# Patient Record
Sex: FEMALE | Race: WHITE | NOT HISPANIC OR LATINO | Employment: PART TIME | URBAN - METROPOLITAN AREA
[De-identification: names, ages, dates, MRNs, and addresses within clinical notes are randomized per-mention and may not be internally consistent; named-entity substitution may affect disease eponyms.]

---

## 2018-04-03 ENCOUNTER — OFFICE VISIT (OUTPATIENT)
Dept: URGENT CARE | Facility: CLINIC | Age: 53
End: 2018-04-03
Payer: COMMERCIAL

## 2018-04-03 VITALS
TEMPERATURE: 97.9 F | RESPIRATION RATE: 16 BRPM | WEIGHT: 152 LBS | DIASTOLIC BLOOD PRESSURE: 90 MMHG | OXYGEN SATURATION: 97 % | HEIGHT: 67 IN | HEART RATE: 78 BPM | BODY MASS INDEX: 23.86 KG/M2 | SYSTOLIC BLOOD PRESSURE: 132 MMHG

## 2018-04-03 DIAGNOSIS — N30.00 ACUTE CYSTITIS WITHOUT HEMATURIA: Primary | ICD-10-CM

## 2018-04-03 LAB
SL AMB  POCT GLUCOSE, UA: ABNORMAL
SL AMB LEUKOCYTE ESTERASE,UA: ABNORMAL
SL AMB POCT BILIRUBIN,UA: ABNORMAL
SL AMB POCT BLOOD,UA: ABNORMAL
SL AMB POCT CLARITY,UA: CLEAR
SL AMB POCT COLOR,UA: YELLOW
SL AMB POCT KETONES,UA: ABNORMAL
SL AMB POCT NITRITE,UA: ABNORMAL
SL AMB POCT PH,UA: 6
SL AMB POCT SPECIFIC GRAVITY,UA: 1
SL AMB POCT URINE PROTEIN: ABNORMAL
SL AMB POCT UROBILINOGEN: 0.2

## 2018-04-03 PROCEDURE — 87086 URINE CULTURE/COLONY COUNT: CPT | Performed by: PHYSICIAN ASSISTANT

## 2018-04-03 PROCEDURE — 81002 URINALYSIS NONAUTO W/O SCOPE: CPT | Performed by: PHYSICIAN ASSISTANT

## 2018-04-03 PROCEDURE — G0382 LEV 3 HOSP TYPE B ED VISIT: HCPCS | Performed by: PHYSICIAN ASSISTANT

## 2018-04-03 RX ORDER — ASPIRIN 81 MG/1
81 TABLET, CHEWABLE ORAL DAILY
COMMUNITY

## 2018-04-03 RX ORDER — ATORVASTATIN CALCIUM 40 MG/1
40 TABLET, FILM COATED ORAL DAILY
COMMUNITY
End: 2019-08-21 | Stop reason: ALTCHOICE

## 2018-04-03 RX ORDER — SULFAMETHOXAZOLE AND TRIMETHOPRIM 800; 160 MG/1; MG/1
1 TABLET ORAL EVERY 12 HOURS SCHEDULED
Qty: 10 TABLET | Refills: 0 | Status: SHIPPED | OUTPATIENT
Start: 2018-04-03 | End: 2018-04-08

## 2018-04-03 RX ORDER — LISINOPRIL 10 MG/1
10 TABLET ORAL DAILY
COMMUNITY
End: 2019-08-30 | Stop reason: SDUPTHER

## 2018-04-03 NOTE — PATIENT INSTRUCTIONS
Hydrate and do not hold urine  We will check a culture and call if we need to change medicines  If back pain or fever go to the ER  Follow up with PCP in 3-5 days  Proceed to  ER if symptoms worsen

## 2018-04-04 LAB — BACTERIA UR CULT: ABNORMAL

## 2018-04-14 LAB — HBA1C MFR BLD HPLC: 5.7 %

## 2018-06-06 ENCOUNTER — OFFICE VISIT (OUTPATIENT)
Dept: URGENT CARE | Facility: CLINIC | Age: 53
End: 2018-06-06
Payer: COMMERCIAL

## 2018-06-06 VITALS
HEART RATE: 89 BPM | HEIGHT: 67 IN | SYSTOLIC BLOOD PRESSURE: 122 MMHG | RESPIRATION RATE: 16 BRPM | WEIGHT: 149.8 LBS | DIASTOLIC BLOOD PRESSURE: 88 MMHG | TEMPERATURE: 97.4 F | OXYGEN SATURATION: 96 % | BODY MASS INDEX: 23.51 KG/M2

## 2018-06-06 DIAGNOSIS — H65.111 ACUTE MUCOID OTITIS MEDIA OF RIGHT EAR: Primary | ICD-10-CM

## 2018-06-06 PROCEDURE — G0382 LEV 3 HOSP TYPE B ED VISIT: HCPCS | Performed by: PHYSICIAN ASSISTANT

## 2018-06-06 RX ORDER — FLUTICASONE PROPIONATE 50 MCG
1 SPRAY, SUSPENSION (ML) NASAL DAILY
Qty: 16 G | Refills: 0 | Status: SHIPPED | OUTPATIENT
Start: 2018-06-06 | End: 2020-08-23 | Stop reason: ALTCHOICE

## 2018-06-06 RX ORDER — AZITHROMYCIN 250 MG/1
TABLET, FILM COATED ORAL
Qty: 6 TABLET | Refills: 0 | Status: SHIPPED | OUTPATIENT
Start: 2018-06-06 | End: 2018-06-11 | Stop reason: ALTCHOICE

## 2018-06-06 NOTE — PATIENT INSTRUCTIONS
Afrin for 3 days, sudafed otc for congestion  Follow up with PCP in 3-5 days  Proceed to  ER if symptoms worsen

## 2018-06-06 NOTE — PROGRESS NOTES
[facility]        NAME: Froylan Faith is a 48 y o  female  : 1965    MRN: [id number]  DATE: 2018  TIME: 2:00 PM    Assessment and Plan   Acute mucoid otitis media of right ear [H65 111]  1  Acute mucoid otitis media of right ear  fluticasone (FLONASE) 50 mcg/act nasal spray    azithromycin (ZITHROMAX) 250 mg tablet         Patient Instructions     Afrin for 3 days, sudafed otc for congestion  Follow up with PCP in 3-5 days  Proceed to  ER if symptoms worsen  Chief Complaint     Chief Complaint   Patient presents with    Cold Like Symptoms     since thurs  Complains of cough, nasal and chest congestion   Earache     flew to [place] and is sensitive to flying  having decreased hearing especially to L ear  History of Present Illness       53-year-old female complains of left greater than right ear pain and pressure for several days  She had a cold and then was flying  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  She denies ear drainage  No ringing in her ears  Some decreased hearing  She has sinus congestion and nasal congestion but no pain  No fever    No chest pain or shortness of breath        Review of Systems   Review of Systems      Current Medications       Current Outpatient Prescriptions:     aspirin 81 mg chewable tablet, Chew 81 mg daily, Disp: , Rfl:     atorvastatin (LIPITOR) 40 mg tablet, Take 40 mg by mouth daily, Disp: , Rfl:     levonorgestrel (MIRENA) 20 MCG/24HR IUD, 1 each by Intrauterine route once, Disp: , Rfl:     lisinopril (ZESTRIL) 10 mg tablet, Take 10 mg by mouth daily, Disp: , Rfl:     azithromycin (ZITHROMAX) 250 mg tablet, 2 tabs on day 1, then 1 tab daily for 4 days, Disp: 6 tablet, Rfl: 0    fluticasone (FLONASE) 50 mcg/act nasal spray, 1 spray into each nostril daily, Disp: 16 g, Rfl: 0    Current Allergies     Allergies as of 2018 - Reviewed 2018   Allergen Reaction Noted    Penicillins  2018 The following portions of the patient's history were reviewed and updated as appropriate: allergies, current medications, past family history, past medical history, past social history, past surgical history and problem list      Past Medical History:   Diagnosis Date    Hyperlipemia     Hypertension        Past Surgical History:   Procedure Laterality Date    APPENDECTOMY         Family History   Problem Relation Age of Onset    Heart disease Mother     Diabetes Mother     Kidney disease Mother     Angina Mother     Heart disease Father     Diabetes Father     Prostate cancer Father          Medications have been verified  Objective   /88 (BP Location: Left arm)   Pulse 89   Temp (!) 97 4 °F (36 3 °C) (Tympanic)   Resp 16   Ht 5' 7" (1 702 m)   Wt 67 9 kg (149 lb 12 8 oz)   SpO2 96%   BMI 23 46 kg/m²        Physical Exam     Physical Exam   Constitutional: She appears well-developed and well-nourished  No distress  HENT:   Right Ear: External ear and ear canal normal  Tympanic membrane is erythematous and bulging  Tympanic membrane is not perforated and not retracted  A middle ear effusion is present  Left Ear: External ear and ear canal normal  Tympanic membrane is bulging  Tympanic membrane is not perforated, not erythematous and not retracted  A middle ear effusion is present  Nose: Rhinorrhea present  Right sinus exhibits no maxillary sinus tenderness and no frontal sinus tenderness  Left sinus exhibits no maxillary sinus tenderness and no frontal sinus tenderness  Mouth/Throat: Oropharynx is clear and moist  No posterior oropharyngeal erythema  Eyes: Conjunctivae and EOM are normal  Pupils are equal, round, and reactive to light  No scleral icterus  Neck: Normal range of motion  Neck supple  Cardiovascular: Normal rate, regular rhythm and normal heart sounds  Pulmonary/Chest: Effort normal and breath sounds normal  No respiratory distress  She has no wheezes  She has no rales  Abdominal: Soft  Bowel sounds are normal  She exhibits no distension and no mass  There is no tenderness  There is no rebound and no guarding  Lymphadenopathy:     She has no cervical adenopathy  Skin: Skin is warm and dry  No rash noted

## 2018-06-11 ENCOUNTER — OFFICE VISIT (OUTPATIENT)
Dept: URGENT CARE | Facility: CLINIC | Age: 53
End: 2018-06-11
Payer: COMMERCIAL

## 2018-06-11 VITALS
DIASTOLIC BLOOD PRESSURE: 88 MMHG | RESPIRATION RATE: 18 BRPM | OXYGEN SATURATION: 98 % | WEIGHT: 148.4 LBS | BODY MASS INDEX: 23.29 KG/M2 | HEART RATE: 87 BPM | TEMPERATURE: 98.7 F | HEIGHT: 67 IN | SYSTOLIC BLOOD PRESSURE: 114 MMHG

## 2018-06-11 DIAGNOSIS — H65.91 MIDDLE EAR EFFUSION, RIGHT: Primary | ICD-10-CM

## 2018-06-11 PROCEDURE — G0382 LEV 3 HOSP TYPE B ED VISIT: HCPCS | Performed by: PHYSICIAN ASSISTANT

## 2018-06-11 NOTE — PROGRESS NOTES
[facility]        NAME: Darian Lobo is a 48 y o  female  : 1965    MRN: [id number]  DATE: 2018  TIME: 11:18 AM    Assessment and Plan   Middle ear effusion, right [H65 91]  1  Middle ear effusion, right           Patient Instructions       Follow up with PCP in 3-5 days  Proceed to  ER if symptoms worsen  Chief Complaint     Chief Complaint   Patient presents with    Earache     Completed course of antibiotcs yesterday for b/l ear infections  States her R ear still feels clogged but no pain         History of Present Illness         56-year-old female complains of continued right ear feeling clogged and congested  No pain in the ears  She has some cough and congestion  She finished the antibiotic  No ear drainage  Some decreased hearing          Review of Systems   Review of Systems      Current Medications       Current Outpatient Prescriptions:     aspirin 81 mg chewable tablet, Chew 81 mg daily, Disp: , Rfl:     atorvastatin (LIPITOR) 40 mg tablet, Take 40 mg by mouth daily, Disp: , Rfl:     fluticasone (FLONASE) 50 mcg/act nasal spray, 1 spray into each nostril daily, Disp: 16 g, Rfl: 0    levonorgestrel (MIRENA) 20 MCG/24HR IUD, 1 each by Intrauterine route once, Disp: , Rfl:     lisinopril (ZESTRIL) 10 mg tablet, Take 10 mg by mouth daily, Disp: , Rfl:     Current Allergies     Allergies as of 2018 - Reviewed 2018   Allergen Reaction Noted    Penicillins  2018            The following portions of the patient's history were reviewed and updated as appropriate: allergies, current medications, past family history, past medical history, past social history, past surgical history and problem list      Past Medical History:   Diagnosis Date    Hyperlipemia     Hypertension        Past Surgical History:   Procedure Laterality Date    APPENDECTOMY         Family History   Problem Relation Age of Onset    Heart disease Mother     Diabetes Mother  Kidney disease Mother     Angina Mother     Heart disease Father     Diabetes Father     Prostate cancer Father          Medications have been verified  Objective   /88   Pulse 87   Temp 98 7 °F (37 1 °C) (Temporal)   Resp 18   Ht 5' 7" (1 702 m)   Wt 67 3 kg (148 lb 6 4 oz)   SpO2 98%   BMI 23 24 kg/m²        Physical Exam     Physical Exam   Constitutional: She appears well-developed and well-nourished  HENT:   Head: Normocephalic and atraumatic  Right Ear: Tympanic membrane is not erythematous, not retracted and not bulging  A middle ear effusion (  straw-colored middle ear effusion but no erythema or bulging) is present  Left Ear: Tympanic membrane is not erythematous, not retracted and not bulging  No middle ear effusion

## 2018-09-20 LAB — HBA1C MFR BLD HPLC: 5.6 %

## 2019-08-21 ENCOUNTER — OFFICE VISIT (OUTPATIENT)
Dept: FAMILY MEDICINE CLINIC | Facility: CLINIC | Age: 54
End: 2019-08-21
Payer: COMMERCIAL

## 2019-08-21 VITALS
SYSTOLIC BLOOD PRESSURE: 118 MMHG | TEMPERATURE: 98.5 F | BODY MASS INDEX: 23.26 KG/M2 | HEIGHT: 67 IN | OXYGEN SATURATION: 98 % | DIASTOLIC BLOOD PRESSURE: 72 MMHG | WEIGHT: 148.2 LBS | HEART RATE: 87 BPM

## 2019-08-21 DIAGNOSIS — E78.2 MIXED HYPERLIPIDEMIA: ICD-10-CM

## 2019-08-21 DIAGNOSIS — J30.9 ALLERGIC RHINITIS, UNSPECIFIED SEASONALITY, UNSPECIFIED TRIGGER: ICD-10-CM

## 2019-08-21 DIAGNOSIS — Z12.31 ENCOUNTER FOR SCREENING MAMMOGRAM FOR BREAST CANCER: ICD-10-CM

## 2019-08-21 DIAGNOSIS — I10 ESSENTIAL HYPERTENSION: Primary | ICD-10-CM

## 2019-08-21 PROCEDURE — 3074F SYST BP LT 130 MM HG: CPT | Performed by: FAMILY MEDICINE

## 2019-08-21 PROCEDURE — 3008F BODY MASS INDEX DOCD: CPT | Performed by: FAMILY MEDICINE

## 2019-08-21 PROCEDURE — 99204 OFFICE O/P NEW MOD 45 MIN: CPT | Performed by: FAMILY MEDICINE

## 2019-08-21 PROCEDURE — 1036F TOBACCO NON-USER: CPT | Performed by: FAMILY MEDICINE

## 2019-08-21 RX ORDER — ROSUVASTATIN CALCIUM 5 MG/1
5 TABLET, COATED ORAL DAILY
COMMUNITY
End: 2019-12-04 | Stop reason: SDUPTHER

## 2019-08-21 NOTE — PROGRESS NOTES
Blanka Cordero 1965 female MRN: [id number]      ASSESSMENT/PLAN  Problem List Items Addressed This Visit        Respiratory    Allergic rhinitis       Cardiovascular and Mediastinum    HTN (hypertension) - Primary    Relevant Orders    Lipid panel    Comprehensive metabolic panel       Other    Mixed hyperlipidemia    Relevant Medications    rosuvastatin (CRESTOR) 5 mg tablet    Other Relevant Orders    Lipid panel    Comprehensive metabolic panel      Other Visit Diagnoses     Encounter for screening mammogram for breast cancer        Relevant Orders    Mammo screening bilateral w 3d & cad        HTN: Within goal, continue current regimen   HLD: CMP + Lipids, continue Crestor  - Encouraged increasing fruit and vegetable intake into diet; discussion starting to walk 5 minutes each day, and slowly increasing   Allergic Rhinitis: No acute exacerbation, continue PRN Flonase    HM:   Mammo ordered -- to schedule in 10/2019  Will request records for PAP   Colonoscopy due in 2027   Depression screening negative     RTC in 6 months, or sooner PRN  No future appointments  SUBJECTIVE  CC: Establish Care      HPI:  Blanka Cordero is a 48 y o  female who presents to establish care  History reviewed and updated as below  HTN: ROS benign as below  HLD: Due for lipid panel  Allergic Rhinitis: PRN Flonase    HM:   PAP -- Unsure, will request records   Mammo 10/18 -- [facility]  [facility] -- Colonoscopy 2017 -- one hyperplastic polyp --> repeat in 10 years     Diet: Moderation, fish, not a lot sweets, 5 little meals per day, snack with cheerios  Exercise: Walking maybe once per week; does take the stairs, park far away         Review of Systems   Constitutional: Positive for diaphoresis (hot flashes)  HENT: Negative for congestion, ear pain, rhinorrhea and sore throat  Eyes: Negative for visual disturbance  Respiratory: Negative for cough and shortness of breath      Cardiovascular: Negative for chest pain, palpitations and leg swelling  Gastrointestinal: Negative for abdominal pain, constipation and diarrhea  Genitourinary: Negative for difficulty urinating and menstrual problem  Neurological: Negative for dizziness and headaches  Psychiatric/Behavioral: Negative for sleep disturbance (Does snore)         Historical Information   The patient history was reviewed and updated as follows:    Past Medical History:   Diagnosis Date    Hearing loss     Hyperlipemia     Hypertension      Past Surgical History:   Procedure Laterality Date    APPENDECTOMY      KNEE SURGERY       Family History   Problem Relation Age of Onset    Heart disease Mother     Diabetes Mother     Kidney disease Mother     Angina Mother     Coronary artery disease Mother     Hyperlipidemia Mother     Dialysis Mother     Heart disease Father     Diabetes Father     Prostate cancer Father     Coronary artery disease Father     Coronary artery disease Brother     Diabetes Brother       Social History   Social History     Substance and Sexual Activity   Alcohol Use Yes    Frequency: 4 or more times a week    Drinks per session: 3 or 4     Social History     Substance and Sexual Activity   Drug Use No     Social History     Tobacco Use   Smoking Status Former Smoker    Last attempt to quit: 10/12/1990    Years since quittin 8   Smokeless Tobacco Never Used   Tobacco Comment    Social smoker        Medications:     Current Outpatient Medications:     aspirin 81 mg chewable tablet, Chew 81 mg daily, Disp: , Rfl:     fluticasone (FLONASE) 50 mcg/act nasal spray, 1 spray into each nostril daily, Disp: 16 g, Rfl: 0    levonorgestrel (MIRENA) 20 MCG/24HR IUD, 1 each by Intrauterine route once, Disp: , Rfl:     lisinopril (ZESTRIL) 10 mg tablet, Take 10 mg by mouth daily, Disp: , Rfl:     rosuvastatin (CRESTOR) 5 mg tablet, Take 5 mg by mouth daily, Disp: , Rfl:   Allergies   Allergen Reactions    Penicillins OBJECTIVE    Vitals:   Vitals:    08/21/19 0904   BP: 118/72   Pulse: 87   Temp: 98 5 °F (36 9 °C)   TempSrc: Tympanic   SpO2: 98%   Weight: 67 2 kg (148 lb 3 2 oz)   Height: 5' 7" (1 702 m)           Physical Exam   Constitutional: She appears well-developed and well-nourished  HENT:   Head: Normocephalic and atraumatic  Right Ear: External ear normal    Left Ear: External ear normal    Nose: Nose normal    Mouth/Throat: Oropharynx is clear and moist    Eyes: Conjunctivae are normal    Neck: No thyromegaly present  Cardiovascular: Normal rate and regular rhythm  Pulmonary/Chest: Effort normal and breath sounds normal  No respiratory distress  Abdominal: Soft  Bowel sounds are normal  She exhibits no distension  There is no tenderness  Musculoskeletal: She exhibits no edema  Lymphadenopathy:     She has no cervical adenopathy  Neurological: She is alert  Skin: Skin is warm and dry  Psychiatric: She has a normal mood and affect                    DO Nate [facility]   8/21/2019  9:41 AM

## 2019-08-30 DIAGNOSIS — I10 ESSENTIAL HYPERTENSION: Primary | ICD-10-CM

## 2019-08-30 RX ORDER — LISINOPRIL 10 MG/1
10 TABLET ORAL DAILY
Qty: 90 TABLET | Refills: 1 | Status: SHIPPED | OUTPATIENT
Start: 2019-08-30 | End: 2019-12-04 | Stop reason: SDUPTHER

## 2019-12-01 ENCOUNTER — TELEPHONE (OUTPATIENT)
Dept: OTHER | Facility: OTHER | Age: 54
End: 2019-12-01

## 2019-12-04 ENCOUNTER — OFFICE VISIT (OUTPATIENT)
Dept: FAMILY MEDICINE CLINIC | Facility: CLINIC | Age: 54
End: 2019-12-04
Payer: COMMERCIAL

## 2019-12-04 VITALS
TEMPERATURE: 97.7 F | HEART RATE: 82 BPM | DIASTOLIC BLOOD PRESSURE: 78 MMHG | SYSTOLIC BLOOD PRESSURE: 120 MMHG | HEIGHT: 67 IN | WEIGHT: 154.8 LBS | OXYGEN SATURATION: 99 % | BODY MASS INDEX: 24.3 KG/M2

## 2019-12-04 DIAGNOSIS — I10 ESSENTIAL HYPERTENSION: ICD-10-CM

## 2019-12-04 DIAGNOSIS — Z30.431 IUD CHECK UP: ICD-10-CM

## 2019-12-04 DIAGNOSIS — Z12.4 SCREENING FOR CERVICAL CANCER: ICD-10-CM

## 2019-12-04 DIAGNOSIS — E78.2 MIXED HYPERLIPIDEMIA: ICD-10-CM

## 2019-12-04 DIAGNOSIS — Z01.419 ENCOUNTER FOR ANNUAL ROUTINE GYNECOLOGICAL EXAMINATION: Primary | ICD-10-CM

## 2019-12-04 PROCEDURE — 99396 PREV VISIT EST AGE 40-64: CPT | Performed by: FAMILY MEDICINE

## 2019-12-04 PROCEDURE — 87624 HPV HI-RISK TYP POOLED RSLT: CPT | Performed by: FAMILY MEDICINE

## 2019-12-04 PROCEDURE — G0145 SCR C/V CYTO,THINLAYER,RESCR: HCPCS | Performed by: FAMILY MEDICINE

## 2019-12-04 RX ORDER — ROSUVASTATIN CALCIUM 5 MG/1
5 TABLET, COATED ORAL DAILY
Qty: 90 TABLET | Refills: 1 | Status: SHIPPED | OUTPATIENT
Start: 2019-12-04 | End: 2020-06-05

## 2019-12-04 RX ORDER — LISINOPRIL 10 MG/1
10 TABLET ORAL DAILY
Qty: 90 TABLET | Refills: 1 | Status: SHIPPED | OUTPATIENT
Start: 2019-12-04 | End: 2020-06-05

## 2019-12-04 NOTE — PROGRESS NOTES
Lei Becker 1965 female MRN: [id number]    ASSESSMENT/PLAN  Problem List Items Addressed This Visit        Other    Encounter for annual routine gynecological examination - Primary    Screening for cervical cancer    Relevant Orders    Liquid-based pap, screening        Benign exam  Pap collected  Mammogram scheduled  OBGYN referral sent, as pt's IUD strings were not visualized on exam      Refills Lisinopril/Crestor sent  Future Appointments   Date Time Provider Cuauhtemoc Montero   2019  8:30 AM MO MAMMO RBC 1 MO RBC Mammo MO [facility]   2020  9:00 AM DO EMEKA Ordaz [facility]          SUBJECTIVE  CC: pt wants IUD removed      HPI:  Lei Becker is a 48 y o  female who presents for annual gynecologic exam  Pt would also like to have her IUD removed  Pt needs refill of Lisinopril, Crestor  Gynecologic History  OB History        0    Para   0    Term   0       0    AB   0    Living   0       SAB   0    TAB   0    Ectopic   0    Multiple   0    Live Births   0               No LMP recorded (lmp unknown)  Contraception: IUD  Last Pap: 2014  Results were: normal  Last mammogram: 2019   Results were: normal        Review of Systems   Respiratory:        Denies breast mass, breast pain, skin changes, nipple discharge   Genitourinary: Positive for dyspareunia  Negative for dysuria, frequency, menstrual problem, pelvic pain, urgency, vaginal discharge and vaginal pain         Historical Information   The patient history was reviewed as follows:    Past Medical History:   Diagnosis Date    Hearing loss     Hyperlipemia     Hypertension      Past Surgical History:   Procedure Laterality Date    APPENDECTOMY      KNEE SURGERY       Family History   Problem Relation Age of Onset    Heart disease Mother     Diabetes Mother     Kidney disease Mother     Angina Mother     Coronary artery disease Mother     Hyperlipidemia Mother     Dialysis Mother    [facility] Heart disease Father     Diabetes Father     Prostate cancer Father     Coronary artery disease Father     Coronary artery disease Brother     Diabetes Brother       Social History       Medications:     Current Outpatient Medications:     aspirin 81 mg chewable tablet, Chew 81 mg daily, Disp: , Rfl:     fluticasone (FLONASE) 50 mcg/act nasal spray, 1 spray into each nostril daily, Disp: 16 g, Rfl: 0    levonorgestrel (MIRENA) 20 MCG/24HR IUD, 1 each by Intrauterine route once, Disp: , Rfl:     lisinopril (ZESTRIL) 10 mg tablet, Take 1 tablet (10 mg total) by mouth daily, Disp: 90 tablet, Rfl: 1    rosuvastatin (CRESTOR) 5 mg tablet, Take 5 mg by mouth daily, Disp: , Rfl:     Allergies   Allergen Reactions    Penicillins        OBJECTIVE  Vitals:   Vitals:    12/04/19 0753   BP: 120/78   Pulse: 82   Temp: 97 7 °F (36 5 °C)   TempSrc: Tympanic   SpO2: 99%   Weight: 70 2 kg (154 lb 12 8 oz)   Height: 5' 7" (1 702 m)         Physical Exam   Constitutional: She appears well-developed and well-nourished  No distress  HENT:   Head: Normocephalic and atraumatic  Pulmonary/Chest: Effort normal  Right breast exhibits no inverted nipple, no mass, no nipple discharge, no skin change and no tenderness  Left breast exhibits no inverted nipple, no mass, no nipple discharge, no skin change and no tenderness  Genitourinary: Rectum normal  There is no rash or lesion on the right labia  There is no rash or lesion on the left labia  Uterus is not tender  Cervix exhibits no motion tenderness and no discharge  Right adnexum displays no mass, no tenderness and no fullness  Left adnexum displays no mass, no tenderness and no fullness  No bleeding in the vagina  No vaginal discharge found  Genitourinary Comments: IUD strings not visualized   Neurological: She is alert  Psychiatric: She has a normal mood and affect                    Annamarie FriendDO Sullivan [facility]   12/4/2019  8:10 AM

## 2019-12-05 LAB
HPV HR 12 DNA CVX QL NAA+PROBE: NEGATIVE
HPV16 DNA CVX QL NAA+PROBE: NEGATIVE
HPV18 DNA CVX QL NAA+PROBE: NEGATIVE

## 2019-12-06 LAB
LAB AP GYN PRIMARY INTERPRETATION: NORMAL
Lab: NORMAL

## 2019-12-16 ENCOUNTER — HOSPITAL ENCOUNTER (OUTPATIENT)
Dept: MAMMOGRAPHY | Facility: CLINIC | Age: 54
Discharge: HOME | End: 2019-12-16
Payer: COMMERCIAL

## 2019-12-16 VITALS — BODY MASS INDEX: 24.17 KG/M2 | WEIGHT: 154 LBS | HEIGHT: 67 IN

## 2019-12-16 DIAGNOSIS — Z12.31 ENCOUNTER FOR SCREENING MAMMOGRAM FOR BREAST CANCER: ICD-10-CM

## 2019-12-16 PROCEDURE — 77067 SCR MAMMO BI INCL CAD: CPT

## 2019-12-16 PROCEDURE — 77063 BREAST TOMOSYNTHESIS BI: CPT

## 2019-12-18 ENCOUNTER — OFFICE VISIT (OUTPATIENT)
Dept: FAMILY MEDICINE CLINIC | Facility: CLINIC | Age: 54
End: 2019-12-18
Payer: COMMERCIAL

## 2019-12-18 VITALS
HEART RATE: 97 BPM | SYSTOLIC BLOOD PRESSURE: 122 MMHG | TEMPERATURE: 98.5 F | WEIGHT: 152.4 LBS | DIASTOLIC BLOOD PRESSURE: 80 MMHG | HEIGHT: 67 IN | OXYGEN SATURATION: 98 % | BODY MASS INDEX: 23.92 KG/M2

## 2019-12-18 DIAGNOSIS — J06.9 VIRAL URI: Primary | ICD-10-CM

## 2019-12-18 PROBLEM — Z01.419 ENCOUNTER FOR ANNUAL ROUTINE GYNECOLOGICAL EXAMINATION: Status: RESOLVED | Noted: 2019-12-04 | Resolved: 2019-12-18

## 2019-12-18 PROBLEM — Z12.4 SCREENING FOR CERVICAL CANCER: Status: RESOLVED | Noted: 2019-12-04 | Resolved: 2019-12-18

## 2019-12-18 PROCEDURE — 1036F TOBACCO NON-USER: CPT | Performed by: FAMILY MEDICINE

## 2019-12-18 PROCEDURE — 99213 OFFICE O/P EST LOW 20 MIN: CPT | Performed by: FAMILY MEDICINE

## 2019-12-18 PROCEDURE — 3008F BODY MASS INDEX DOCD: CPT | Performed by: FAMILY MEDICINE

## 2019-12-18 NOTE — PROGRESS NOTES
Thea Tobias 1965 female MRN: [id number]      ASSESSMENT/PLAN  Problem List Items Addressed This Visit        Respiratory    Viral URI - Primary        Symptoms likely viral in nature  No AOM, though pt does have an effusion  Encouraged Flonase and continued decongestant use  Discussed that sudafed can help to effusion up, but given pt's diagnosis of HTN, would be hesitant to use this  Precautions including fever discussed  Future Appointments   Date Time Provider Cuauhtemoc Montero   12/30/2019  2:20 PM Roman Ashraf MD [facility]   2/24/2020  9:00 AM DO EMEKA Mackay  [facility]          SUBJECTIVE  CC: Nasal Congestion (right ear clogged)      HPI:  Thea Tobias is a 48 y o  female who presents for an acute visit due to URI symptoms  URI symptoms x 1 week -- nasal congestion, rhinorrhea, minimal cough   R ear congestion x5 days   Has been taking OTC decongestant and cough syrup, which helps the nasal congestion but not the ears  No known sick contacts      Review of Systems   Constitutional: Negative for fever  HENT: Positive for congestion, ear pain (congestion), postnasal drip and rhinorrhea  Negative for sore throat  Respiratory: Negative for cough          Historical Information   The patient history was reviewed and updated as follows:    Past Medical History:   Diagnosis Date    Hearing loss     Hyperlipemia     Hypertension      Past Surgical History:   Procedure Laterality Date    APPENDECTOMY      KNEE SURGERY       Family History   Problem Relation Age of Onset    Heart disease Mother     Diabetes Mother     Kidney disease Mother     Angina Mother     Coronary artery disease Mother     Hyperlipidemia Mother     Dialysis Mother     Heart disease Father     Diabetes Father     Prostate cancer Father 76    Coronary artery disease Father     Coronary artery disease Brother     Diabetes Brother     No Known Problems Sister     No Known Problems Paternal Grandmother     No Known Problems Paternal Aunt     No Known Problems Paternal Aunt     No Known Problems Paternal Aunt       Social History   Social History     Substance and Sexual Activity   Alcohol Use Yes    Frequency: 4 or more times a week    Drinks per session: 3 or 4     Social History     Substance and Sexual Activity   Drug Use No     Social History     Tobacco Use   Smoking Status Former Smoker    Last attempt to quit: 10/12/1990    Years since quittin 2   Smokeless Tobacco Never Used   Tobacco Comment    Social smoker        Medications:     Current Outpatient Medications:     aspirin 81 mg chewable tablet, Chew 81 mg daily, Disp: , Rfl:     fluticasone (FLONASE) 50 mcg/act nasal spray, 1 spray into each nostril daily, Disp: 16 g, Rfl: 0    levonorgestrel (MIRENA) 20 MCG/24HR IUD, 1 each by Intrauterine route once, Disp: , Rfl:     lisinopril (ZESTRIL) 10 mg tablet, Take 1 tablet (10 mg total) by mouth daily, Disp: 90 tablet, Rfl: 1    rosuvastatin (CRESTOR) 5 mg tablet, Take 1 tablet (5 mg total) by mouth daily, Disp: 90 tablet, Rfl: 1  Allergies   Allergen Reactions    Penicillins        OBJECTIVE    Vitals:   Vitals:    19 1440   BP: 122/80   Pulse: 97   Temp: 98 5 °F (36 9 °C)   TempSrc: Tympanic   SpO2: 98%   Weight: 69 1 kg (152 lb 6 4 oz)   Height: 5' 7" (1 702 m)           Physical Exam   Constitutional: She appears well-developed and well-nourished  No distress  HENT:   Head: Normocephalic and atraumatic  Right Ear: External ear normal  Tympanic membrane is not erythematous, not retracted and not bulging  A middle ear effusion is present  Left Ear: External ear normal  Tympanic membrane is not erythematous, not retracted and not bulging  Nose: Rhinorrhea present  Mouth/Throat: Oropharynx is clear and moist  No oropharyngeal exudate, posterior oropharyngeal edema or posterior oropharyngeal erythema     Eyes: Conjunctivae are normal  Neck: No thyromegaly present  Cardiovascular: Normal rate and regular rhythm  Pulmonary/Chest: Effort normal and breath sounds normal  No respiratory distress  She has no wheezes  She has no rales  Abdominal: Soft  Bowel sounds are normal  She exhibits no distension  There is no tenderness  Lymphadenopathy:     She has no cervical adenopathy  Neurological: She is alert  Skin: Skin is warm and dry  Psychiatric: She has a normal mood and affect  Vitals reviewed                   DO Nate Camejo [facility]   12/18/2019  2:53 PM

## 2019-12-30 ENCOUNTER — OFFICE VISIT (OUTPATIENT)
Dept: OBGYN CLINIC | Facility: CLINIC | Age: 54
End: 2019-12-30
Payer: COMMERCIAL

## 2019-12-30 VITALS — WEIGHT: 148.8 LBS | SYSTOLIC BLOOD PRESSURE: 112 MMHG | DIASTOLIC BLOOD PRESSURE: 78 MMHG | BODY MASS INDEX: 23.31 KG/M2

## 2019-12-30 DIAGNOSIS — Z79.890 HORMONE REPLACEMENT THERAPY: Primary | ICD-10-CM

## 2019-12-30 DIAGNOSIS — Z30.432 ENCOUNTER FOR IUD REMOVAL: ICD-10-CM

## 2019-12-30 PROCEDURE — 99242 OFF/OP CONSLTJ NEW/EST SF 20: CPT | Performed by: STUDENT IN AN ORGANIZED HEALTH CARE EDUCATION/TRAINING PROGRAM

## 2019-12-30 PROCEDURE — 58301 REMOVE INTRAUTERINE DEVICE: CPT | Performed by: STUDENT IN AN ORGANIZED HEALTH CARE EDUCATION/TRAINING PROGRAM

## 2019-12-30 RX ORDER — ESTRADIOL 0.5 MG/1
0.5 TABLET ORAL DAILY
Qty: 30 TABLET | Refills: 4 | Status: SHIPPED | OUTPATIENT
Start: 2019-12-30 | End: 2020-05-20

## 2019-12-30 NOTE — PROGRESS NOTES
Assessment/Plan:     49 yo G0 s/p IUD removal      Pt does have hot flashes + vaginal dryness and is interested in hormone replacement  She has no contraindications: no h/o cardiovascular disease/stroke/VTE/liver disease/breast cancer  Her 10 yr cardiovascular risk based on MARILU/AHA calculator is 1 47%  Will start her on estrace 0 5 mg + prometrium 100 mg daily  Counseled pt that we will use the smallest effective dose for the shortest period of time  Will f/u in 3 months  Hormone replacement therapy  -     estradiol (ESTRACE) 0 5 MG tablet; Take 1 tablet (0 5 mg total) by mouth daily  -     progesterone (PROMETRIUM) 100 MG capsule; Take 1 capsule (100 mg total) by mouth daily    Encounter for IUD removal  -     Iud removal       Subjective:     Patient ID: Kimmy Dang is a 48 y o  female  49 yo [de-identified] perimenopausal female presents for removal of IUD and to discuss hormone therapy  IUD was attempted to be removed by her PCP, however strings were not visualized  Pt did report that her strings were trimmed at her initial follow after IUD placement  Pt also reports 1-2 hot flashes daily for the past 2 years which have not improved  She denies night sweats, but does report that the hot flashes are bothersome  She also has vaginal dryness and pain with intercourse  She is interested in trying hormone therapy  Review of Systems   Constitutional: Positive for diaphoresis  Respiratory: Negative  Genitourinary: Positive for vaginal pain  Objective:    Vitals:    12/30/19 1415   BP: 112/78          Physical Exam   Constitutional: She appears well-nourished  Pulmonary/Chest: Effort normal    Genitourinary:   Genitourinary Comments: Vaginal atrophy, no IUD string initially visualized  See procedure note for details  Skin: Skin is warm  Psychiatric: She has a normal mood and affect

## 2019-12-30 NOTE — PROGRESS NOTES
Iud removal  Date/Time: 12/30/2019 2:48 PM  Performed by: Kenneth Sam MD  Authorized by: Kenneth Sam MD     Consent:     Consent obtained:  Verbal    Consent given by:  Patient    Procedure risks and benefits discussed: yes      Patient questions answered: yes      Patient agrees, verbalizes understanding, and wants to proceed: yes    Procedure:     Removed with no complications: yes      Other reason for removal:  Pt menopausal   Comments:      IUD strings not visualized  Liset clamp used to grasp strings and were visualized  Strings then grasped with a ring forceps and IUD removed intact

## 2019-12-31 ENCOUNTER — APPOINTMENT (OUTPATIENT)
Dept: LAB | Facility: CLINIC | Age: 54
End: 2019-12-31
Payer: COMMERCIAL

## 2019-12-31 DIAGNOSIS — I10 ESSENTIAL HYPERTENSION: ICD-10-CM

## 2019-12-31 DIAGNOSIS — E78.2 MIXED HYPERLIPIDEMIA: ICD-10-CM

## 2019-12-31 LAB
ALBUMIN SERPL BCP-MCNC: 3.9 G/DL (ref 3.5–5)
ALP SERPL-CCNC: 97 U/L (ref 46–116)
ALT SERPL W P-5'-P-CCNC: 29 U/L (ref 12–78)
ANION GAP SERPL CALCULATED.3IONS-SCNC: 3 MMOL/L (ref 4–13)
AST SERPL W P-5'-P-CCNC: 18 U/L (ref 5–45)
BILIRUB SERPL-MCNC: 0.54 MG/DL (ref 0.2–1)
BUN SERPL-MCNC: 17 MG/DL (ref 5–25)
CALCIUM SERPL-MCNC: 9.2 MG/DL (ref 8.3–10.1)
CHLORIDE SERPL-SCNC: 106 MMOL/L (ref 100–108)
CHOLEST SERPL-MCNC: 245 MG/DL (ref 50–200)
CO2 SERPL-SCNC: 30 MMOL/L (ref 21–32)
CREAT SERPL-MCNC: 0.75 MG/DL (ref 0.6–1.3)
GFR SERPL CREATININE-BSD FRML MDRD: 92 ML/MIN/1.73SQ M
GLUCOSE P FAST SERPL-MCNC: 87 MG/DL (ref 65–99)
HDLC SERPL-MCNC: 66 MG/DL
LDLC SERPL CALC-MCNC: 162 MG/DL (ref 0–100)
NONHDLC SERPL-MCNC: 179 MG/DL
POTASSIUM SERPL-SCNC: 4.2 MMOL/L (ref 3.5–5.3)
PROT SERPL-MCNC: 7.5 G/DL (ref 6.4–8.2)
SODIUM SERPL-SCNC: 139 MMOL/L (ref 136–145)
TRIGL SERPL-MCNC: 87 MG/DL

## 2019-12-31 PROCEDURE — 80061 LIPID PANEL: CPT

## 2019-12-31 PROCEDURE — 36415 COLL VENOUS BLD VENIPUNCTURE: CPT

## 2019-12-31 PROCEDURE — 80053 COMPREHEN METABOLIC PANEL: CPT

## 2020-02-24 ENCOUNTER — OFFICE VISIT (OUTPATIENT)
Dept: FAMILY MEDICINE CLINIC | Facility: CLINIC | Age: 55
End: 2020-02-24
Payer: COMMERCIAL

## 2020-02-24 VITALS
OXYGEN SATURATION: 99 % | HEIGHT: 67 IN | BODY MASS INDEX: 24.01 KG/M2 | DIASTOLIC BLOOD PRESSURE: 82 MMHG | SYSTOLIC BLOOD PRESSURE: 116 MMHG | TEMPERATURE: 96.1 F | WEIGHT: 153 LBS | HEART RATE: 80 BPM

## 2020-02-24 DIAGNOSIS — E78.2 MIXED HYPERLIPIDEMIA: Primary | ICD-10-CM

## 2020-02-24 DIAGNOSIS — I10 ESSENTIAL HYPERTENSION: ICD-10-CM

## 2020-02-24 DIAGNOSIS — Z78.0 POST-MENOPAUSAL: ICD-10-CM

## 2020-02-24 PROBLEM — J06.9 VIRAL URI: Status: RESOLVED | Noted: 2019-12-18 | Resolved: 2020-02-24

## 2020-02-24 PROCEDURE — 1036F TOBACCO NON-USER: CPT | Performed by: FAMILY MEDICINE

## 2020-02-24 PROCEDURE — 99214 OFFICE O/P EST MOD 30 MIN: CPT | Performed by: FAMILY MEDICINE

## 2020-02-24 PROCEDURE — 3079F DIAST BP 80-89 MM HG: CPT | Performed by: FAMILY MEDICINE

## 2020-02-24 PROCEDURE — 3074F SYST BP LT 130 MM HG: CPT | Performed by: FAMILY MEDICINE

## 2020-02-24 PROCEDURE — 3008F BODY MASS INDEX DOCD: CPT | Performed by: FAMILY MEDICINE

## 2020-02-24 NOTE — PROGRESS NOTES
Bette Martinez 1965 female MRN: [id number]      ASSESSMENT/PLAN  Problem List Items Addressed This Visit        Cardiovascular and Mediastinum    HTN (hypertension)       Other    Mixed hyperlipidemia - Primary    Post-menopausal        HTN: Within goal  HLD: Reviewed lipid panel and discussed increasing Crestor vs diet changes -- pt elects to work on diet modification   Post menopausal symptoms: F/u with OBGYN as scheduled      Future Appointments   Date Time Provider Cuauhtemoc Montero   4/8/2020  1:00 PM Junior Casper MD [facility]   8/24/2020  8:40 AM DO EMEKA Pendleton  [facility]          SUBJECTIVE  CC: Hyperlipidemia      HPI:  Bette Martinez is a 48 y o  female who presents for follow up of chronic conditions as detailed below and lab review  HTN: Home BPs: None   HLD: Lipids: Total 245, , HDL 66, TG 87  Menopause: Initiated on Estrogen + Progesterone by OBGYN     Cr 0 75, GFR 92; LFTs benign, Glucose 87     Starting walking 4 times per week     Review of Systems   Eyes: Negative for visual disturbance  Respiratory: Negative for shortness of breath  Cardiovascular: Negative for chest pain, palpitations and leg swelling  Neurological: Negative for dizziness and headaches         Historical Information   The patient history was reviewed and updated as follows:    Past Medical History:   Diagnosis Date    Abnormal Pap smear of cervix     Hearing loss     Hyperlipemia     Hypertension     Urinary tract infection      Past Surgical History:   Procedure Laterality Date    APPENDECTOMY      KNEE SURGERY       Family History   Problem Relation Age of Onset    Heart disease Mother     Diabetes Mother     Kidney disease Mother     Angina Mother     Coronary artery disease Mother     Hyperlipidemia Mother     Dialysis Mother     Heart disease Father     Diabetes Father     Prostate cancer Father 76    Coronary artery disease Father    [facility] Coronary artery disease Brother     Diabetes Brother     No Known Problems Sister     No Known Problems Paternal Grandmother     No Known Problems Paternal Aunt     No Known Problems Paternal Aunt     No Known Problems Paternal Aunt       Social History   Social History     Substance and Sexual Activity   Alcohol Use Yes    Frequency: 4 or more times a week    Drinks per session: 3 or 4     Social History     Substance and Sexual Activity   Drug Use No     Social History     Tobacco Use   Smoking Status Former Smoker    Last attempt to quit: 10/12/1990    Years since quittin 3   Smokeless Tobacco Never Used   Tobacco Comment    Social smoker        Medications:     Current Outpatient Medications:     aspirin 81 mg chewable tablet, Chew 81 mg daily, Disp: , Rfl:     estradiol (ESTRACE) 0 5 MG tablet, Take 1 tablet (0 5 mg total) by mouth daily, Disp: 30 tablet, Rfl: 4    fluticasone (FLONASE) 50 mcg/act nasal spray, 1 spray into each nostril daily, Disp: 16 g, Rfl: 0    lisinopril (ZESTRIL) 10 mg tablet, Take 1 tablet (10 mg total) by mouth daily, Disp: 90 tablet, Rfl: 1    progesterone (PROMETRIUM) 100 MG capsule, Take 1 capsule (100 mg total) by mouth daily, Disp: 30 capsule, Rfl: 4    rosuvastatin (CRESTOR) 5 mg tablet, Take 1 tablet (5 mg total) by mouth daily, Disp: 90 tablet, Rfl: 1  Allergies   Allergen Reactions    Penicillins        OBJECTIVE    Vitals:   Vitals:    20 0853   BP: 116/82   Pulse: 80   Temp: (!) 96 1 °F (35 6 °C)   SpO2: 99%   Weight: 69 4 kg (153 lb)   Height: 5' 7" (1 702 m)           Physical Exam   Constitutional: She appears well-developed and well-nourished  No distress  HENT:   Head: Normocephalic and atraumatic  Right Ear: External ear normal    Left Ear: External ear normal    Cardiovascular: Normal rate and regular rhythm  Pulmonary/Chest: Effort normal and breath sounds normal  No respiratory distress  Abdominal: Soft   Bowel sounds are normal  She exhibits no distension  There is no tenderness  Musculoskeletal: She exhibits no edema  Neurological: She is alert  Skin: Skin is warm and dry  Psychiatric: She has a normal mood and affect  Vitals reviewed                   DO Nate Gal[facility]   2/24/2020  9:11 AM

## 2020-04-08 ENCOUNTER — TELEMEDICINE (OUTPATIENT)
Dept: OBGYN CLINIC | Facility: CLINIC | Age: 55
End: 2020-04-08
Payer: COMMERCIAL

## 2020-04-08 DIAGNOSIS — N95.1 PERIMENOPAUSAL VASOMOTOR SYMPTOMS: Primary | ICD-10-CM

## 2020-04-08 PROCEDURE — 99212 OFFICE O/P EST SF 10 MIN: CPT | Performed by: STUDENT IN AN ORGANIZED HEALTH CARE EDUCATION/TRAINING PROGRAM

## 2020-05-20 DIAGNOSIS — Z79.890 HORMONE REPLACEMENT THERAPY: ICD-10-CM

## 2020-05-20 RX ORDER — ESTRADIOL 0.5 MG/1
TABLET ORAL
Qty: 30 TABLET | Refills: 4 | Status: SHIPPED | OUTPATIENT
Start: 2020-05-20 | End: 2021-02-24

## 2020-06-05 DIAGNOSIS — I10 ESSENTIAL HYPERTENSION: ICD-10-CM

## 2020-06-05 DIAGNOSIS — E78.2 MIXED HYPERLIPIDEMIA: ICD-10-CM

## 2020-06-05 RX ORDER — ROSUVASTATIN CALCIUM 5 MG/1
TABLET, COATED ORAL
Qty: 90 TABLET | Refills: 1 | Status: SHIPPED | OUTPATIENT
Start: 2020-06-05 | End: 2020-12-03

## 2020-06-05 RX ORDER — LISINOPRIL 10 MG/1
TABLET ORAL
Qty: 90 TABLET | Refills: 1 | Status: SHIPPED | OUTPATIENT
Start: 2020-06-05 | End: 2020-12-03

## 2020-08-17 ENCOUNTER — TELEPHONE (OUTPATIENT)
Dept: OBGYN CLINIC | Facility: CLINIC | Age: 55
End: 2020-08-17

## 2020-08-17 NOTE — TELEPHONE ENCOUNTER
----- Message from Erick Hensley sent at 8/17/2020  7:10 AM EDT -----  Regarding: Prescription Question  Contact: [phone]  Good morning, Dr Rylie Clement  I have a question about my hormone medications  As you may recall, I had the IUD taken out in January and we started hormone therapy  Because of the IUD, I wasn't 100% sure if my period had stopped  I thought I was going through menopause because of the hot flashes, etc  Recently, I've been experiencing spotting and light cramping periodically (maybe every 3-4 weeks for a few days since end of May/Leia)  On Thursday to Saturday of this last week, the bleeding was heavier but only for a day  (When I used to menstruate, it was very heavy for 8-10 days)  My question is should I continue with both hormone pills? I'd be hesitant to come off the estrogen since that seems to have helped the painful intercourse syndrome I was experiencing  Just wanted your thoughts  I have an appointment next week with Dr Skip Davis (8/24) just as an FYI  Thanks so much  Best, Jackie Bautista (p s  you delivered my girlfriend's twins on 30 May - they're beautiful!  Good job!)

## 2020-08-20 DIAGNOSIS — N92.1 BREAKTHROUGH BLEEDING: Primary | ICD-10-CM

## 2020-08-20 NOTE — TELEPHONE ENCOUNTER
I sent Sari Grullon a message through [address] [place] [address] [address] re: this matter   thanks

## 2020-08-23 NOTE — PROGRESS NOTES
Ezekiel Cortes 1965 female MRN: [id number]      ASSESSMENT/PLAN  Problem List Items Addressed This Visit        Cardiovascular and Mediastinum    HTN (hypertension) - Primary    Relevant Orders    Comprehensive metabolic panel       Nervous and Auditory    Bilateral hearing loss    Relevant Orders    Ambulatory Referral to Otolaryngology       Other    Mixed hyperlipidemia    Relevant Orders    Comprehensive metabolic panel    Lipid panel      Other Visit Diagnoses     Screening for HIV (human immunodeficiency virus)        Relevant Orders    HIV 1/2 Antigen/Antibody (4th Generation) w Reflex [facility]    Screening for breast cancer        Relevant Orders    Mammo screening bilateral w 3d & cad        HTN: Within goal, continue current regimen   HLD: Update lipids + CMP     HIV Screening ordered, pt agreeable     Mammo UTD, Next due 12/2020 -- order placed   PAP UTD, Next due 12/2024  CRC Screening UTD, Next due 5/2027     Deferred flu shot  Got first Shingles dose at [facility]      Future Appointments   Date Time Provider Cuauhtemoc Montero   8/30/2020  4:15 PM MO US 1 [facility]          SUBJECTIVE  CC: Hyperlipidemia and Hypertension      HPI:  Ezekiel Cortes is a 50 y o  female who presents for follow up of chronic conditions as detailed below  HTN: Home BPs: None  HLD: Tolerating statin without issue     Acutely:   Hearing Loss   A few years ago, saw an ENT and Audiology, who stated 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   Finds herself not hearing as well   Does note intermittent ringing in her ears (not frequent)   No associated with any headache or dizziness        Review of Systems   HENT: Positive for hearing loss and tinnitus (intermittent)  Eyes: Negative for visual disturbance  Respiratory: Negative for shortness of breath  Cardiovascular: Negative for chest pain, palpitations and leg swelling          Intermittent chest wall pain (has a history of costochondritis) Neurological: Negative for dizziness, light-headedness and headaches         Historical Information   The patient history was reviewed and updated as follows:    Past Medical History:   Diagnosis Date    Abnormal Pap smear of cervix     Hearing loss     Hyperlipemia     Hypertension     Urinary tract infection      Past Surgical History:   Procedure Laterality Date    APPENDECTOMY      KNEE SURGERY       Family History   Problem Relation Age of Onset    Heart disease Mother     Diabetes Mother     Kidney disease Mother     Angina Mother     Coronary artery disease Mother     Hyperlipidemia Mother     Dialysis Mother     Heart disease Father     Diabetes Father     Prostate cancer Father 76    Coronary artery disease Father     Coronary artery disease Brother     Diabetes Brother     No Known Problems Sister     No Known Problems Paternal Grandmother     No Known Problems Paternal Aunt     No Known Problems Paternal Aunt     No Known Problems Paternal Aunt       Social History   Social History     Substance and Sexual Activity   Alcohol Use Yes    Frequency: 4 or more times a week    Drinks per session: 3 or 4     Social History     Substance and Sexual Activity   Drug Use No     Social History     Tobacco Use   Smoking Status Former Smoker    Last attempt to quit: 10/12/1990    Years since quittin 8   Smokeless Tobacco Never Used   Tobacco Comment    Social smoker        Medications:     Current Outpatient Medications:     aspirin 81 mg chewable tablet, Chew 81 mg daily, Disp: , Rfl:     estradiol (ESTRACE) 0 5 MG tablet, TAKE ONE TABLET BY MOUTH EVERY DAY, Disp: 30 tablet, Rfl: 4    lisinopril (ZESTRIL) 10 mg tablet, TAKE ONE TABLET BY MOUTH EVERY DAY, Disp: 90 tablet, Rfl: 1    progesterone (PROMETRIUM) 100 MG capsule, TAKE ONE CAPSULE BY MOUTH EVERY DAY, Disp: 30 capsule, Rfl: 4    rosuvastatin (CRESTOR) 5 mg tablet, TAKE ONE TABLET BY MOUTH EVERY DAY, Disp: 90 tablet, Rfl: 1  Allergies   Allergen Reactions    Penicillins        OBJECTIVE    Vitals:   Vitals:    08/24/20 0838   BP: 114/78   Pulse: 78   Temp: 97 9 °F (36 6 °C)   SpO2: 100%   Weight: 65 1 kg (143 lb 9 6 oz)   Height: 5' 7" (1 702 m)           Physical Exam  Vitals signs and nursing note reviewed  Constitutional:       General: She is not in acute distress  Appearance: Normal appearance  HENT:      Head: Normocephalic and atraumatic  Right Ear: Tympanic membrane and ear canal normal       Left Ear: Tympanic membrane and ear canal normal       Nose: Nose normal       Mouth/Throat:      Mouth: Mucous membranes are moist       Pharynx: No oropharyngeal exudate or posterior oropharyngeal erythema  Eyes:      Conjunctiva/sclera: Conjunctivae normal    Cardiovascular:      Rate and Rhythm: Normal rate and regular rhythm  Pulmonary:      Effort: Pulmonary effort is normal  No respiratory distress  Breath sounds: Normal breath sounds  Abdominal:      General: Bowel sounds are normal  There is no distension  Palpations: Abdomen is soft  Tenderness: There is no abdominal tenderness  Musculoskeletal:      Right lower leg: No edema  Left lower leg: No edema  Lymphadenopathy:      Cervical: No cervical adenopathy  Skin:     General: Skin is warm and dry  Neurological:      General: No focal deficit present  Mental Status: She is alert     Psychiatric:         Mood and Affect: Mood normal                     Johnathan Aase, DO  [facility]   8/24/2020  8:54 AM

## 2020-08-24 ENCOUNTER — OFFICE VISIT (OUTPATIENT)
Dept: FAMILY MEDICINE CLINIC | Facility: CLINIC | Age: 55
End: 2020-08-24
Payer: COMMERCIAL

## 2020-08-24 VITALS
TEMPERATURE: 97.9 F | HEIGHT: 67 IN | HEART RATE: 78 BPM | BODY MASS INDEX: 22.54 KG/M2 | SYSTOLIC BLOOD PRESSURE: 114 MMHG | WEIGHT: 143.6 LBS | OXYGEN SATURATION: 100 % | DIASTOLIC BLOOD PRESSURE: 78 MMHG

## 2020-08-24 DIAGNOSIS — Z11.4 SCREENING FOR HIV (HUMAN IMMUNODEFICIENCY VIRUS): ICD-10-CM

## 2020-08-24 DIAGNOSIS — I10 ESSENTIAL HYPERTENSION: Primary | ICD-10-CM

## 2020-08-24 DIAGNOSIS — H91.93 BILATERAL HEARING LOSS, UNSPECIFIED HEARING LOSS TYPE: ICD-10-CM

## 2020-08-24 DIAGNOSIS — Z12.39 SCREENING FOR BREAST CANCER: ICD-10-CM

## 2020-08-24 DIAGNOSIS — E78.2 MIXED HYPERLIPIDEMIA: ICD-10-CM

## 2020-08-24 PROCEDURE — 99214 OFFICE O/P EST MOD 30 MIN: CPT | Performed by: FAMILY MEDICINE

## 2020-08-24 PROCEDURE — 3078F DIAST BP <80 MM HG: CPT | Performed by: FAMILY MEDICINE

## 2020-08-24 PROCEDURE — 1036F TOBACCO NON-USER: CPT | Performed by: FAMILY MEDICINE

## 2020-08-24 PROCEDURE — 3008F BODY MASS INDEX DOCD: CPT | Performed by: FAMILY MEDICINE

## 2020-08-24 PROCEDURE — 3074F SYST BP LT 130 MM HG: CPT | Performed by: FAMILY MEDICINE

## 2020-08-30 ENCOUNTER — HOSPITAL ENCOUNTER (OUTPATIENT)
Dept: ULTRASOUND IMAGING | Facility: HOSPITAL | Age: 55
Discharge: HOME | End: 2020-08-30
Attending: STUDENT IN AN ORGANIZED HEALTH CARE EDUCATION/TRAINING PROGRAM
Payer: COMMERCIAL

## 2020-08-30 DIAGNOSIS — N92.1 BREAKTHROUGH BLEEDING: ICD-10-CM

## 2020-08-30 PROCEDURE — 76856 US EXAM PELVIC COMPLETE: CPT

## 2020-08-30 PROCEDURE — 76830 TRANSVAGINAL US NON-OB: CPT

## 2020-09-04 ENCOUNTER — TELEPHONE (OUTPATIENT)
Dept: OBGYN CLINIC | Facility: CLINIC | Age: 55
End: 2020-09-04

## 2020-09-04 NOTE — TELEPHONE ENCOUNTER
----- Message from Jennifer Martinez MD sent at 9/4/2020  1:34 PM EDT -----  Discussed US findings with Patricia Alvarado - can we schedule her for endometrial biopsy?  thanks

## 2020-09-29 ENCOUNTER — OFFICE VISIT (OUTPATIENT)
Dept: OBGYN CLINIC | Facility: MEDICAL CENTER | Age: 55
End: 2020-09-29
Payer: COMMERCIAL

## 2020-09-29 VITALS — BODY MASS INDEX: 22.98 KG/M2 | DIASTOLIC BLOOD PRESSURE: 80 MMHG | WEIGHT: 146.7 LBS | SYSTOLIC BLOOD PRESSURE: 124 MMHG

## 2020-09-29 DIAGNOSIS — N93.9 ABNORMAL UTERINE BLEEDING (AUB): Primary | ICD-10-CM

## 2020-09-29 DIAGNOSIS — Z32.02 NEGATIVE PREGNANCY TEST: ICD-10-CM

## 2020-09-29 LAB — SL AMB POCT URINE HCG: NORMAL

## 2020-09-29 PROCEDURE — 3079F DIAST BP 80-89 MM HG: CPT | Performed by: STUDENT IN AN ORGANIZED HEALTH CARE EDUCATION/TRAINING PROGRAM

## 2020-09-29 PROCEDURE — 88305 TISSUE EXAM BY PATHOLOGIST: CPT | Performed by: PATHOLOGY

## 2020-09-29 PROCEDURE — 99213 OFFICE O/P EST LOW 20 MIN: CPT | Performed by: STUDENT IN AN ORGANIZED HEALTH CARE EDUCATION/TRAINING PROGRAM

## 2020-09-29 PROCEDURE — 58100 BIOPSY OF UTERUS LINING: CPT | Performed by: STUDENT IN AN ORGANIZED HEALTH CARE EDUCATION/TRAINING PROGRAM

## 2020-09-29 PROCEDURE — 81025 URINE PREGNANCY TEST: CPT | Performed by: STUDENT IN AN ORGANIZED HEALTH CARE EDUCATION/TRAINING PROGRAM

## 2020-09-29 PROCEDURE — 1036F TOBACCO NON-USER: CPT | Performed by: STUDENT IN AN ORGANIZED HEALTH CARE EDUCATION/TRAINING PROGRAM

## 2020-09-29 NOTE — PROGRESS NOTES
Endometrial biopsy    Date/Time: 9/29/2020 2:49 PM  Performed by: Lani Morse MD  Authorized by: Lani Morse MD     Consent:     Consent obtained:  Verbal    Consent given by:  Patient    Procedural risks discussed:  Bleeding, possible continued pain, repeat procedure, infection and failure rate    Patient questions answered: yes      Patient agrees, verbalizes understanding, and wants to proceed: yes    Indication:     Indications: Post-menopausal bleeding      Chronicity of post-menopausal bleeding:  Recurrent    Progression of post-menopausal bleeding:  Unchanged  Procedure:     Procedure: endometrial biopsy with Pipelle      A bivalve speculum was placed in the vagina: yes      Cervix cleaned and prepped: yes      Uterus sounded: yes      Uterus sound depth (cm):  7    Specimen collected: specimen collected and sent to pathology      Patient tolerated procedure well with no complications: yes    Findings:     Cervix: normal    Comments: On exam there was noted to be dark red blood in the vagina/at the cervix  Three passes were made with the pipelle  We discussed that depending on the result she may need to stop hormones  She expressed understanding

## 2020-10-06 DIAGNOSIS — N93.9 ABNORMAL UTERINE BLEEDING (AUB): Primary | ICD-10-CM

## 2020-10-30 ENCOUNTER — LAB (OUTPATIENT)
Dept: LAB | Facility: CLINIC | Age: 55
End: 2020-10-30
Payer: COMMERCIAL

## 2020-10-30 DIAGNOSIS — E78.2 MIXED HYPERLIPIDEMIA: ICD-10-CM

## 2020-10-30 DIAGNOSIS — I10 ESSENTIAL HYPERTENSION: ICD-10-CM

## 2020-10-30 DIAGNOSIS — Z11.4 SCREENING FOR HIV (HUMAN IMMUNODEFICIENCY VIRUS): ICD-10-CM

## 2020-10-30 LAB
ALBUMIN SERPL BCP-MCNC: 3.7 G/DL (ref 3.5–5)
ALP SERPL-CCNC: 73 U/L (ref 46–116)
ALT SERPL W P-5'-P-CCNC: 34 U/L (ref 12–78)
ANION GAP SERPL CALCULATED.3IONS-SCNC: 3 MMOL/L (ref 4–13)
AST SERPL W P-5'-P-CCNC: 21 U/L (ref 5–45)
BILIRUB SERPL-MCNC: 0.44 MG/DL (ref 0.2–1)
BUN SERPL-MCNC: 12 MG/DL (ref 5–25)
CALCIUM SERPL-MCNC: 8.9 MG/DL (ref 8.3–10.1)
CHLORIDE SERPL-SCNC: 108 MMOL/L (ref 100–108)
CHOLEST SERPL-MCNC: 248 MG/DL (ref 50–200)
CO2 SERPL-SCNC: 30 MMOL/L (ref 21–32)
CREAT SERPL-MCNC: 0.61 MG/DL (ref 0.6–1.3)
GFR SERPL CREATININE-BSD FRML MDRD: 104 ML/MIN/1.73SQ M
GLUCOSE P FAST SERPL-MCNC: 82 MG/DL (ref 65–99)
HDLC SERPL-MCNC: 70 MG/DL
LDLC SERPL CALC-MCNC: 154 MG/DL (ref 0–100)
NONHDLC SERPL-MCNC: 178 MG/DL
POTASSIUM SERPL-SCNC: 4.2 MMOL/L (ref 3.5–5.3)
PROT SERPL-MCNC: 7.2 G/DL (ref 6.4–8.2)
SODIUM SERPL-SCNC: 141 MMOL/L (ref 136–145)
TRIGL SERPL-MCNC: 120 MG/DL

## 2020-10-30 PROCEDURE — 36415 COLL VENOUS BLD VENIPUNCTURE: CPT

## 2020-10-30 PROCEDURE — 87389 HIV-1 AG W/HIV-1&-2 AB AG IA: CPT

## 2020-10-30 PROCEDURE — 80053 COMPREHEN METABOLIC PANEL: CPT

## 2020-10-30 PROCEDURE — 80061 LIPID PANEL: CPT

## 2020-10-31 LAB — HIV 1+2 AB+HIV1 P24 AG SERPL QL IA: NORMAL

## 2020-11-19 DIAGNOSIS — Z01.83 ENCOUNTER FOR BLOOD TYPING: Primary | ICD-10-CM

## 2020-12-03 DIAGNOSIS — I10 ESSENTIAL HYPERTENSION: ICD-10-CM

## 2020-12-03 DIAGNOSIS — E78.2 MIXED HYPERLIPIDEMIA: ICD-10-CM

## 2020-12-03 RX ORDER — ROSUVASTATIN CALCIUM 5 MG/1
TABLET, COATED ORAL
Qty: 90 TABLET | Refills: 1 | Status: SHIPPED | OUTPATIENT
Start: 2020-12-03 | End: 2021-02-25 | Stop reason: DRUGHIGH

## 2020-12-03 RX ORDER — LISINOPRIL 10 MG/1
TABLET ORAL
Qty: 90 TABLET | Refills: 1 | Status: SHIPPED | OUTPATIENT
Start: 2020-12-03 | End: 2021-06-07

## 2020-12-11 ENCOUNTER — LAB (OUTPATIENT)
Dept: LAB | Facility: CLINIC | Age: 55
End: 2020-12-11
Payer: COMMERCIAL

## 2020-12-11 DIAGNOSIS — Z01.83 ENCOUNTER FOR BLOOD TYPING: ICD-10-CM

## 2020-12-11 LAB
ABO GROUP BLD: NORMAL
RH BLD: POSITIVE

## 2020-12-11 PROCEDURE — 36415 COLL VENOUS BLD VENIPUNCTURE: CPT

## 2020-12-11 PROCEDURE — 86900 BLOOD TYPING SEROLOGIC ABO: CPT

## 2020-12-11 PROCEDURE — 86901 BLOOD TYPING SEROLOGIC RH(D): CPT

## 2021-01-07 ENCOUNTER — HOSPITAL ENCOUNTER (OUTPATIENT)
Dept: ULTRASOUND IMAGING | Facility: HOSPITAL | Age: 56
Discharge: HOME | End: 2021-01-07
Attending: STUDENT IN AN ORGANIZED HEALTH CARE EDUCATION/TRAINING PROGRAM
Payer: COMMERCIAL

## 2021-01-07 ENCOUNTER — HOSPITAL ENCOUNTER (OUTPATIENT)
Dept: MAMMOGRAPHY | Facility: CLINIC | Age: 56
Discharge: HOME | End: 2021-01-07
Payer: COMMERCIAL

## 2021-01-07 DIAGNOSIS — N93.9 ABNORMAL UTERINE BLEEDING (AUB): ICD-10-CM

## 2021-01-07 DIAGNOSIS — Z12.39 SCREENING FOR BREAST CANCER: ICD-10-CM

## 2021-01-07 PROCEDURE — 77063 BREAST TOMOSYNTHESIS BI: CPT

## 2021-01-07 PROCEDURE — 76856 US EXAM PELVIC COMPLETE: CPT

## 2021-01-07 PROCEDURE — 77067 SCR MAMMO BI INCL CAD: CPT

## 2021-01-07 PROCEDURE — 76830 TRANSVAGINAL US NON-OB: CPT

## 2021-01-18 ENCOUNTER — TELEPHONE (OUTPATIENT)
Dept: OBGYN CLINIC | Facility: CLINIC | Age: 56
End: 2021-01-18

## 2021-01-18 NOTE — TELEPHONE ENCOUNTER
Spoke to pt and let her know about the [employer] message  She said she is traveling due to a family  in [place] and will try to log in later or this wk to check it      ----- Message from Lisa Sorensen MD sent at 2021 11:18 AM EST -----  Hey all- I sent Juana Graciela a [employer] message about her US   Can you just confirm she got it? havent heard back

## 2021-01-20 ENCOUNTER — TELEPHONE (OUTPATIENT)
Dept: OBGYN CLINIC | Facility: CLINIC | Age: 56
End: 2021-01-20

## 2021-01-22 ENCOUNTER — CLINICAL SUPPORT (OUTPATIENT)
Dept: FAMILY MEDICINE CLINIC | Facility: CLINIC | Age: 56
End: 2021-01-22

## 2021-01-22 DIAGNOSIS — Z11.59 SCREENING FOR VIRAL DISEASE: Primary | ICD-10-CM

## 2021-01-22 PROCEDURE — U0005 INFEC AGEN DETEC AMPLI PROBE: HCPCS | Performed by: FAMILY MEDICINE

## 2021-01-22 PROCEDURE — U0003 INFECTIOUS AGENT DETECTION BY NUCLEIC ACID (DNA OR RNA); SEVERE ACUTE RESPIRATORY SYNDROME CORONAVIRUS 2 (SARS-COV-2) (CORONAVIRUS DISEASE [COVID-19]), AMPLIFIED PROBE TECHNIQUE, MAKING USE OF HIGH THROUGHPUT TECHNOLOGIES AS DESCRIBED BY CMS-2020-01-R: HCPCS | Performed by: FAMILY MEDICINE

## 2021-01-23 LAB — SARS-COV-2 RNA RESP QL NAA+PROBE: NEGATIVE

## 2021-02-09 DIAGNOSIS — Z23 ENCOUNTER FOR IMMUNIZATION: ICD-10-CM

## 2021-02-25 ENCOUNTER — OFFICE VISIT (OUTPATIENT)
Dept: FAMILY MEDICINE CLINIC | Facility: CLINIC | Age: 56
End: 2021-02-25
Payer: COMMERCIAL

## 2021-02-25 VITALS
WEIGHT: 147 LBS | HEART RATE: 89 BPM | HEIGHT: 67 IN | BODY MASS INDEX: 23.07 KG/M2 | OXYGEN SATURATION: 99 % | DIASTOLIC BLOOD PRESSURE: 74 MMHG | SYSTOLIC BLOOD PRESSURE: 130 MMHG | TEMPERATURE: 96.4 F

## 2021-02-25 DIAGNOSIS — E78.2 MIXED HYPERLIPIDEMIA: ICD-10-CM

## 2021-02-25 DIAGNOSIS — I10 ESSENTIAL HYPERTENSION: Primary | ICD-10-CM

## 2021-02-25 PROCEDURE — 99213 OFFICE O/P EST LOW 20 MIN: CPT | Performed by: FAMILY MEDICINE

## 2021-02-25 PROCEDURE — 3725F SCREEN DEPRESSION PERFORMED: CPT | Performed by: FAMILY MEDICINE

## 2021-02-25 PROCEDURE — 3075F SYST BP GE 130 - 139MM HG: CPT | Performed by: FAMILY MEDICINE

## 2021-02-25 PROCEDURE — 3078F DIAST BP <80 MM HG: CPT | Performed by: FAMILY MEDICINE

## 2021-02-25 PROCEDURE — 3008F BODY MASS INDEX DOCD: CPT | Performed by: FAMILY MEDICINE

## 2021-02-25 RX ORDER — ROSUVASTATIN CALCIUM 10 MG/1
10 TABLET, COATED ORAL DAILY
Qty: 30 TABLET | Refills: 1 | Status: SHIPPED | OUTPATIENT
Start: 2021-02-25 | End: 2021-05-05

## 2021-02-25 NOTE — PROGRESS NOTES
Telma Alvarado 1965 female MRN: [id number]      ASSESSMENT/PLAN  Problem List Items Addressed This Visit        Cardiovascular and Mediastinum    HTN (hypertension) - Primary       Other    Mixed hyperlipidemia        HTN: Within goal   HLD: Increase Crestor to 10 mg and update lipids + CMP in 6 months       No future appointments  SUBJECTIVE  CC: Follow-up (review medication and discuss cholesterol )      HPI:  Telma Alvarado is a 50 y o  female who presents for chronic follow up as detailed below  HTN: ROS as below   HLD: LDL above goal on last check     Has "sore muscle pain" that seems to happen on her chest  Does get massage therapy  Tender to touch  Does have a chronic cough that has been present for years, was previously told it was due to ACE  Review of Systems   Eyes: Negative for visual disturbance  Respiratory: Negative for shortness of breath  Cardiovascular: Negative for chest pain, palpitations and leg swelling  Gastrointestinal: Negative for constipation and diarrhea  Musculoskeletal: Positive for myalgias  Neurological: Negative for dizziness and headaches         Historical Information   The patient history was reviewed and updated as follows:    Past Medical History:   Diagnosis Date    Abnormal Pap smear of cervix     Hearing loss     Hyperlipemia     Hypertension     Urinary tract infection      Past Surgical History:   Procedure Laterality Date    APPENDECTOMY      KNEE SURGERY       Family History   Problem Relation Age of Onset    Heart disease Mother     Diabetes Mother     Kidney disease Mother     Angina Mother     Coronary artery disease Mother     Hyperlipidemia Mother     Dialysis Mother     Heart disease Father     Diabetes Father     Prostate cancer Father 76    Coronary artery disease Father     Coronary artery disease Brother     Diabetes Brother     No Known Problems Sister     No Known Problems Maternal Grandmother     No Known Problems Paternal Grandmother     No Known Problems Paternal Aunt     No Known Problems Paternal Aunt     No Known Problems Paternal Aunt       Social History   Social History     Substance and Sexual Activity   Alcohol Use Yes    Frequency: 4 or more times a week    Drinks per session: 3 or 4     Social History     Substance and Sexual Activity   Drug Use No     Social History     Tobacco Use   Smoking Status Former Smoker    Quit date: 10/12/1990    Years since quittin 3   Smokeless Tobacco Never Used   Tobacco Comment    Social smoker        Medications:     Current Outpatient Medications:     aspirin 81 mg chewable tablet, Chew 81 mg daily, Disp: , Rfl:     lisinopril (ZESTRIL) 10 mg tablet, TAKE ONE TABLET BY MOUTH EVERY DAY, Disp: 90 tablet, Rfl: 1    rosuvastatin (CRESTOR) 5 mg tablet, TAKE ONE TABLET BY MOUTH EVERY DAY, Disp: 90 tablet, Rfl: 1  Allergies   Allergen Reactions    Penicillins        OBJECTIVE    Vitals:   Vitals:    21 0820   BP: 130/74   Pulse: 89   Temp: (!) 96 4 °F (35 8 °C)   SpO2: 99%   Weight: 66 7 kg (147 lb)   Height: 5' 7" (1 702 m)           Physical Exam  Vitals signs and nursing note reviewed  Constitutional:       General: She is not in acute distress  Appearance: Normal appearance  HENT:      Head: Normocephalic and atraumatic  Right Ear: Tympanic membrane and ear canal normal       Left Ear: Tympanic membrane and ear canal normal    Eyes:      Conjunctiva/sclera: Conjunctivae normal    Cardiovascular:      Rate and Rhythm: Normal rate and regular rhythm  Pulmonary:      Effort: Pulmonary effort is normal  No respiratory distress  Breath sounds: Normal breath sounds  Abdominal:      General: Bowel sounds are normal  There is no distension  Palpations: Abdomen is soft  Tenderness: There is no abdominal tenderness  Musculoskeletal:      Right lower leg: No edema  Left lower leg: No edema     Lymphadenopathy: Cervical: No cervical adenopathy  Skin:     General: Skin is warm and dry  Neurological:      General: No focal deficit present  Mental Status: She is alert     Psychiatric:         Mood and Affect: Mood normal                     DO Brooks Ordaz [address] [facility]   2/25/2021  8:38 AM

## 2021-03-15 ENCOUNTER — IMMUNIZATIONS (OUTPATIENT)
Dept: FAMILY MEDICINE CLINIC | Facility: HOSPITAL | Age: 56
End: 2021-03-15

## 2021-03-15 DIAGNOSIS — Z23 ENCOUNTER FOR IMMUNIZATION: Primary | ICD-10-CM

## 2021-03-15 PROCEDURE — 91301 SARS-COV-2 / COVID-19 MRNA VACCINE (MODERNA) 100 MCG: CPT

## 2021-03-15 PROCEDURE — 0011A SARS-COV-2 / COVID-19 MRNA VACCINE (MODERNA) 100 MCG: CPT

## 2021-04-14 ENCOUNTER — IMMUNIZATIONS (OUTPATIENT)
Dept: FAMILY MEDICINE CLINIC | Facility: HOSPITAL | Age: 56
End: 2021-04-14

## 2021-04-14 DIAGNOSIS — Z23 ENCOUNTER FOR IMMUNIZATION: Primary | ICD-10-CM

## 2021-04-14 PROCEDURE — 0012A SARS-COV-2 / COVID-19 MRNA VACCINE (MODERNA) 100 MCG: CPT

## 2021-04-14 PROCEDURE — 91301 SARS-COV-2 / COVID-19 MRNA VACCINE (MODERNA) 100 MCG: CPT

## 2021-05-05 DIAGNOSIS — E78.2 MIXED HYPERLIPIDEMIA: ICD-10-CM

## 2021-05-05 RX ORDER — ROSUVASTATIN CALCIUM 10 MG/1
TABLET, COATED ORAL
Qty: 30 TABLET | Refills: 1 | Status: SHIPPED | OUTPATIENT
Start: 2021-05-05 | End: 2021-05-06

## 2021-05-06 DIAGNOSIS — E78.2 MIXED HYPERLIPIDEMIA: ICD-10-CM

## 2021-05-06 RX ORDER — ROSUVASTATIN CALCIUM 10 MG/1
TABLET, COATED ORAL
Qty: 30 TABLET | Refills: 1 | Status: SHIPPED | OUTPATIENT
Start: 2021-05-06 | End: 2021-07-13

## 2021-06-05 DIAGNOSIS — I10 ESSENTIAL HYPERTENSION: ICD-10-CM

## 2021-06-07 DIAGNOSIS — I10 ESSENTIAL HYPERTENSION: ICD-10-CM

## 2021-06-07 RX ORDER — LISINOPRIL 10 MG/1
TABLET ORAL
Qty: 90 TABLET | Refills: 1 | Status: SHIPPED | OUTPATIENT
Start: 2021-06-07 | End: 2021-06-07 | Stop reason: SDUPTHER

## 2021-06-07 RX ORDER — LISINOPRIL 10 MG/1
10 TABLET ORAL DAILY
Qty: 90 TABLET | Refills: 1 | Status: SHIPPED | OUTPATIENT
Start: 2021-06-07 | End: 2022-01-10 | Stop reason: SDUPTHER

## 2021-07-13 DIAGNOSIS — E78.2 MIXED HYPERLIPIDEMIA: ICD-10-CM

## 2021-07-13 RX ORDER — ROSUVASTATIN CALCIUM 10 MG/1
TABLET, COATED ORAL
Qty: 30 TABLET | Refills: 1 | Status: SHIPPED | OUTPATIENT
Start: 2021-07-13 | End: 2021-09-17 | Stop reason: SDUPTHER

## 2021-07-20 ENCOUNTER — VBI (OUTPATIENT)
Dept: ADMINISTRATIVE | Facility: OTHER | Age: 56
End: 2021-07-20

## 2021-08-26 ENCOUNTER — OFFICE VISIT (OUTPATIENT)
Dept: FAMILY MEDICINE CLINIC | Facility: CLINIC | Age: 56
End: 2021-08-26
Payer: COMMERCIAL

## 2021-08-26 VITALS
DIASTOLIC BLOOD PRESSURE: 78 MMHG | HEART RATE: 88 BPM | OXYGEN SATURATION: 100 % | HEIGHT: 67 IN | SYSTOLIC BLOOD PRESSURE: 110 MMHG | BODY MASS INDEX: 23.7 KG/M2 | WEIGHT: 151 LBS

## 2021-08-26 DIAGNOSIS — E78.2 MIXED HYPERLIPIDEMIA: ICD-10-CM

## 2021-08-26 DIAGNOSIS — I10 ESSENTIAL HYPERTENSION: ICD-10-CM

## 2021-08-26 DIAGNOSIS — Z00.00 HEALTHCARE MAINTENANCE: Primary | ICD-10-CM

## 2021-08-26 PROCEDURE — 3008F BODY MASS INDEX DOCD: CPT | Performed by: FAMILY MEDICINE

## 2021-08-26 PROCEDURE — 99396 PREV VISIT EST AGE 40-64: CPT | Performed by: FAMILY MEDICINE

## 2021-08-26 PROCEDURE — 3074F SYST BP LT 130 MM HG: CPT | Performed by: FAMILY MEDICINE

## 2021-08-26 PROCEDURE — 1036F TOBACCO NON-USER: CPT | Performed by: FAMILY MEDICINE

## 2021-08-26 PROCEDURE — 3078F DIAST BP <80 MM HG: CPT | Performed by: FAMILY MEDICINE

## 2021-08-26 NOTE — PROGRESS NOTES
Rosalie Artis 1965 female MRN: [id number]      ASSESSMENT/PLAN  Problem List Items Addressed This Visit        Cardiovascular and Mediastinum    HTN (hypertension)       Other    Mixed hyperlipidemia      Other Visit Diagnoses     Healthcare maintenance    -  Primary        BP WNL   BMI WNL   CMP + Lipids to update HLD (as previously ordered)   Pap UTD (12/2019)  Mammogram UTD (01/2021)  CRC UTD (05/2017)   Encouraged regular dental and eye exams     Encouraged to follow up if chest pain recurs, though history suggestive of MSK etiology  Future Appointments   Date Time Provider Cuauhtemoc Montero   10/7/2021  9:30 AM [facility]          SUBJECTIVE  CC: Follow-up (no questions or concerns )      HPI:  Rosalie Artis is a 49 y o  female who presents for annual physical  History reviewed and updated as below  No acute concerns  Back in April, in R side of chest to R shoulder blade had a heavy pulling/dull pain; constant, sometimes woke up her from sleep  When she was younger, she had tendonitis in her shoulder that she had treatment for  Lasted for a week a two  Review of Systems   Constitutional: Negative for unexpected weight change  HENT: Negative for congestion, ear pain, rhinorrhea and sore throat  Hearing loss: got hearing aids  Eyes: Negative for visual disturbance  Respiratory: Positive for cough (thought to be due to lisinopril; a little bit productive recently)  Negative for shortness of breath  Cardiovascular: Negative for chest pain, palpitations and leg swelling  Gastrointestinal: Negative for abdominal pain, constipation and diarrhea  Endocrine: Negative for polyuria  Genitourinary: Negative for dysuria  Neurological: Negative for dizziness and headaches  Psychiatric/Behavioral: Negative for sleep disturbance         Historical Information   The patient history was reviewed and updated as follows:    Past Medical History: Diagnosis Date    Abnormal Pap smear of cervix     Hearing loss     Hyperlipemia     Hypertension     Urinary tract infection      Past Surgical History:   Procedure Laterality Date    APPENDECTOMY      KNEE SURGERY       Family History   Problem Relation Age of Onset    Heart disease Mother     Diabetes Mother     Kidney disease Mother     Angina Mother     Coronary artery disease Mother     Hyperlipidemia Mother     Dialysis Mother     Heart disease Father     Diabetes Father     Prostate cancer Father 76    Coronary artery disease Father     Coronary artery disease Brother     Diabetes Brother     No Known Problems Sister     No Known Problems Maternal Grandmother     No Known Problems Paternal Grandmother     No Known Problems Paternal Aunt     No Known Problems Paternal Aunt     No Known Problems Paternal Aunt       Social History   Social History     Substance and Sexual Activity   Alcohol Use Yes     Social History     Substance and Sexual Activity   Drug Use No     Social History     Tobacco Use   Smoking Status Former Smoker    Quit date: 10/12/1990    Years since quittin 8   Smokeless Tobacco Never Used   Tobacco Comment    Social smoker        Medications:     Current Outpatient Medications:     aspirin 81 mg chewable tablet, Chew 81 mg daily, Disp: , Rfl:     lisinopril (ZESTRIL) 10 mg tablet, Take 1 tablet (10 mg total) by mouth daily, Disp: 90 tablet, Rfl: 1    rosuvastatin (CRESTOR) 10 MG tablet, TAKE ONE TABLET BY MOUTH EVERY DAY, Disp: 30 tablet, Rfl: 1  Allergies   Allergen Reactions    Penicillins        OBJECTIVE    Vitals:   Vitals:    21 0805   BP: 110/78   Pulse: 88   SpO2: 100%   Weight: 68 5 kg (151 lb)   Height: 5' 7" (1 702 m)           Physical Exam  Vitals and nursing note reviewed  Constitutional:       General: She is not in acute distress  Appearance: Normal appearance  HENT:      Head: Normocephalic and atraumatic        Right Ear: Tympanic membrane and ear canal normal       Left Ear: Tympanic membrane and ear canal normal       Nose: Nose normal       Mouth/Throat:      Mouth: Mucous membranes are moist       Pharynx: No oropharyngeal exudate or posterior oropharyngeal erythema  Eyes:      Conjunctiva/sclera: Conjunctivae normal    Cardiovascular:      Rate and Rhythm: Normal rate and regular rhythm  Pulmonary:      Effort: Pulmonary effort is normal  No respiratory distress  Breath sounds: Normal breath sounds  Abdominal:      General: Bowel sounds are normal  There is no distension  Palpations: Abdomen is soft  Tenderness: There is no abdominal tenderness  Musculoskeletal:      Right lower leg: No edema  Left lower leg: No edema  Lymphadenopathy:      Cervical: No cervical adenopathy  Skin:     General: Skin is warm and dry  Neurological:      General: No focal deficit present  Mental Status: She is alert     Psychiatric:         Mood and Affect: Mood normal                     Amy Westbrook DO  [facility]   8/26/2021  8:33 AM

## 2021-09-14 ENCOUNTER — TELEPHONE (OUTPATIENT)
Dept: OBGYN CLINIC | Facility: CLINIC | Age: 56
End: 2021-09-14

## 2021-09-14 NOTE — TELEPHONE ENCOUNTER
----- Message from Clarisa Colón sent at 9/14/2021  7:08 AM EDT -----  Regarding: Prescription Question  Contact: [phone]  Dr Nimisha Serrano  I hope you are well  After trying out the hormone therapy which isn't for me, I am still having some issues with dryness and discomfort during intercourse  I recall us speaking about an estrogen topical cream that could help  Can you prescribe that for me or should I come in for an appointment first? Thank you!  Favio Ladd

## 2021-09-17 DIAGNOSIS — E78.2 MIXED HYPERLIPIDEMIA: ICD-10-CM

## 2021-09-17 DIAGNOSIS — Z78.0 POST-MENOPAUSAL: Primary | ICD-10-CM

## 2021-09-17 RX ORDER — ROSUVASTATIN CALCIUM 10 MG/1
10 TABLET, COATED ORAL DAILY
Qty: 30 TABLET | Refills: 0 | Status: SHIPPED | OUTPATIENT
Start: 2021-09-17 | End: 2021-10-18

## 2021-09-17 NOTE — TELEPHONE ENCOUNTER
I sent topical estrogen  If she has breast/pelvic pain yearly with PCP she does not need an appointment  If not she should see us  Please also ensure no additional vaginal bleeding

## 2021-09-22 ENCOUNTER — TELEPHONE (OUTPATIENT)
Dept: OBGYN CLINIC | Facility: CLINIC | Age: 56
End: 2021-09-22

## 2021-10-16 DIAGNOSIS — E78.2 MIXED HYPERLIPIDEMIA: ICD-10-CM

## 2021-10-18 RX ORDER — ROSUVASTATIN CALCIUM 10 MG/1
TABLET, COATED ORAL
Qty: 30 TABLET | Refills: 0 | Status: SHIPPED | OUTPATIENT
Start: 2021-10-18 | End: 2021-11-22

## 2021-10-20 ENCOUNTER — APPOINTMENT (OUTPATIENT)
Dept: LAB | Facility: CLINIC | Age: 56
End: 2021-10-20
Payer: COMMERCIAL

## 2021-10-20 DIAGNOSIS — E78.2 MIXED HYPERLIPIDEMIA: ICD-10-CM

## 2021-10-20 DIAGNOSIS — I10 ESSENTIAL HYPERTENSION: ICD-10-CM

## 2021-10-20 LAB
ALBUMIN SERPL BCP-MCNC: 3.9 G/DL (ref 3.5–5)
ALP SERPL-CCNC: 93 U/L (ref 46–116)
ALT SERPL W P-5'-P-CCNC: 30 U/L (ref 12–78)
ANION GAP SERPL CALCULATED.3IONS-SCNC: 2 MMOL/L (ref 4–13)
AST SERPL W P-5'-P-CCNC: 23 U/L (ref 5–45)
BILIRUB SERPL-MCNC: 0.56 MG/DL (ref 0.2–1)
BUN SERPL-MCNC: 10 MG/DL (ref 5–25)
CALCIUM SERPL-MCNC: 9.1 MG/DL (ref 8.3–10.1)
CHLORIDE SERPL-SCNC: 106 MMOL/L (ref 100–108)
CHOLEST SERPL-MCNC: 242 MG/DL (ref 50–200)
CO2 SERPL-SCNC: 30 MMOL/L (ref 21–32)
CREAT SERPL-MCNC: 0.71 MG/DL (ref 0.6–1.3)
GFR SERPL CREATININE-BSD FRML MDRD: 97 ML/MIN/1.73SQ M
GLUCOSE P FAST SERPL-MCNC: 99 MG/DL (ref 65–99)
HDLC SERPL-MCNC: 73 MG/DL
LDLC SERPL CALC-MCNC: 149 MG/DL (ref 0–100)
NONHDLC SERPL-MCNC: 169 MG/DL
POTASSIUM SERPL-SCNC: 4.3 MMOL/L (ref 3.5–5.3)
PROT SERPL-MCNC: 7.5 G/DL (ref 6.4–8.2)
SODIUM SERPL-SCNC: 138 MMOL/L (ref 136–145)
TRIGL SERPL-MCNC: 101 MG/DL

## 2021-10-20 PROCEDURE — 36415 COLL VENOUS BLD VENIPUNCTURE: CPT

## 2021-10-20 PROCEDURE — 80053 COMPREHEN METABOLIC PANEL: CPT

## 2021-10-20 PROCEDURE — 80061 LIPID PANEL: CPT

## 2021-11-11 NOTE — TELEPHONE ENCOUNTER
Please apologize this got lost in my bin  Absolutely OK to use both the erythromycin ointment and estrogen cream - they should not affect each other

## 2021-11-20 DIAGNOSIS — E78.2 MIXED HYPERLIPIDEMIA: ICD-10-CM

## 2021-11-22 RX ORDER — ROSUVASTATIN CALCIUM 10 MG/1
TABLET, COATED ORAL
Qty: 30 TABLET | Refills: 0 | Status: SHIPPED | OUTPATIENT
Start: 2021-11-22 | End: 2021-12-23 | Stop reason: SDUPTHER

## 2021-12-23 DIAGNOSIS — E78.2 MIXED HYPERLIPIDEMIA: ICD-10-CM

## 2021-12-23 RX ORDER — ROSUVASTATIN CALCIUM 10 MG/1
10 TABLET, COATED ORAL DAILY
Qty: 30 TABLET | Refills: 0 | Status: SHIPPED | OUTPATIENT
Start: 2021-12-23 | End: 2022-02-02

## 2022-01-10 DIAGNOSIS — Z78.0 POST-MENOPAUSAL: ICD-10-CM

## 2022-01-10 DIAGNOSIS — I10 ESSENTIAL HYPERTENSION: ICD-10-CM

## 2022-01-10 RX ORDER — LISINOPRIL 10 MG/1
10 TABLET ORAL DAILY
Qty: 90 TABLET | Refills: 0 | Status: SHIPPED | OUTPATIENT
Start: 2022-01-10 | End: 2022-04-25

## 2022-01-14 ENCOUNTER — OFFICE VISIT (OUTPATIENT)
Dept: FAMILY MEDICINE CLINIC | Facility: CLINIC | Age: 57
End: 2022-01-14
Payer: COMMERCIAL

## 2022-01-14 ENCOUNTER — APPOINTMENT (OUTPATIENT)
Dept: RADIOLOGY | Facility: CLINIC | Age: 57
End: 2022-01-14
Payer: COMMERCIAL

## 2022-01-14 VITALS
TEMPERATURE: 98.3 F | HEIGHT: 67 IN | DIASTOLIC BLOOD PRESSURE: 82 MMHG | OXYGEN SATURATION: 98 % | HEART RATE: 78 BPM | BODY MASS INDEX: 23.54 KG/M2 | WEIGHT: 150 LBS | SYSTOLIC BLOOD PRESSURE: 134 MMHG

## 2022-01-14 DIAGNOSIS — R05.9 COUGH: ICD-10-CM

## 2022-01-14 DIAGNOSIS — Z12.31 ENCOUNTER FOR SCREENING MAMMOGRAM FOR MALIGNANT NEOPLASM OF BREAST: ICD-10-CM

## 2022-01-14 DIAGNOSIS — R22.42 MASS OF LEFT LOWER LEG: Primary | ICD-10-CM

## 2022-01-14 DIAGNOSIS — R07.9 CHEST PAIN, UNSPECIFIED TYPE: ICD-10-CM

## 2022-01-14 PROCEDURE — 71046 X-RAY EXAM CHEST 2 VIEWS: CPT

## 2022-01-14 PROCEDURE — 3008F BODY MASS INDEX DOCD: CPT | Performed by: FAMILY MEDICINE

## 2022-01-14 PROCEDURE — 99214 OFFICE O/P EST MOD 30 MIN: CPT | Performed by: FAMILY MEDICINE

## 2022-01-14 PROCEDURE — 3079F DIAST BP 80-89 MM HG: CPT | Performed by: FAMILY MEDICINE

## 2022-01-14 PROCEDURE — 3075F SYST BP GE 130 - 139MM HG: CPT | Performed by: FAMILY MEDICINE

## 2022-01-14 PROCEDURE — 3725F SCREEN DEPRESSION PERFORMED: CPT | Performed by: FAMILY MEDICINE

## 2022-01-14 PROCEDURE — 1036F TOBACCO NON-USER: CPT | Performed by: FAMILY MEDICINE

## 2022-01-14 NOTE — PROGRESS NOTES
Chelita Eng 1965 female MRN: [id number]      ASSESSMENT/PLAN  Problem List Items Addressed This Visit     None      Visit Diagnoses     Mass of left lower leg    -  Primary    Relevant Orders    US extremity soft tissue    Chest pain, unspecified type        Relevant Orders    XR chest pa & lateral    Cough        Relevant Orders    XR chest pa & lateral    Encounter for screening mammogram for malignant neoplasm of breast        Relevant Orders    Mammo screening bilateral w 3d & cad        Mass behind knee -- possible Baker's cyst or other benign mass; will US to further evaluate  No overlying skin changes to suggest acute infectious process     Cough/Chest pain -- benign lung exam  Possibly reactive in setting of known allergies  Will get CXR to r/o PNA  Continue supportive care  Future Appointments   Date Time Provider Cuauhtemoc Montero   4/14/2022  9:30 AM [facility]  [facility]          SUBJECTIVE  CC: Mass (behind left knee), Throat Pain (going on for the past 2 weeks), and Cough (more productive)      HPI:  Chelita Eng is a 49 y o  female who presents due to leg mass  Mass behind L knee -- first noted about 2 weeks ago   A little stiff, no pain, redness/warmth that she is aware of, no change in size     About 2 weeks ago, she feels a pain "deep in her throat" and in her R chest and sometimes radiates into her R shoulder   Sometimes worsens with deep breath   Cough seems a bit more productive   A little bit "more of a snotty girl" than usual   Some chest tightness, but no shortness of breath       Review of Systems   Constitutional: Negative for fever  HENT: Positive for congestion, rhinorrhea and sore throat  Respiratory: Positive for cough and chest tightness  Negative for wheezing  Cardiovascular: Positive for chest pain (in deep R chest)  Musculoskeletal: Positive for arthralgias          (+) mass behind L knee        Historical Information The patient history was reviewed and updated as follows:    Past Medical History:   Diagnosis Date    Abnormal Pap smear of cervix     Hearing loss     Hyperlipemia     Hypertension     Urinary tract infection      Past Surgical History:   Procedure Laterality Date    APPENDECTOMY      KNEE SURGERY       Family History   Problem Relation Age of Onset    Heart disease Mother     Diabetes Mother     Kidney disease Mother     Angina Mother     Coronary artery disease Mother     Hyperlipidemia Mother     Dialysis Mother     Heart disease Father     Diabetes Father     Prostate cancer Father 76    Coronary artery disease Father     Coronary artery disease Brother     Diabetes Brother     No Known Problems Sister     No Known Problems Maternal Grandmother     No Known Problems Paternal Grandmother     No Known Problems Paternal Aunt     No Known Problems Paternal Aunt     No Known Problems Paternal Aunt       Social History   Social History     Substance and Sexual Activity   Alcohol Use Yes     Social History     Substance and Sexual Activity   Drug Use No     Social History     Tobacco Use   Smoking Status Former Smoker    Quit date: 10/12/1990    Years since quittin 2   Smokeless Tobacco Never Used   Tobacco Comment    Social smoker        Medications:     Current Outpatient Medications:     lisinopril (ZESTRIL) 10 mg tablet, Take 1 tablet (10 mg total) by mouth daily, Disp: 90 tablet, Rfl: 0    rosuvastatin (CRESTOR) 10 MG tablet, Take 1 tablet (10 mg total) by mouth daily, Disp: 30 tablet, Rfl: 0    aspirin 81 mg chewable tablet, Chew 81 mg daily, Disp: , Rfl:     conjugated estrogens (PREMARIN) vaginal cream, Use 0 5 g daily for 2 weeks, then decrease to twice per week , Disp: 30 g, Rfl: 0  Allergies   Allergen Reactions    Penicillins        OBJECTIVE    Vitals:   Vitals:    22 1118   BP: 134/82   BP Location: Left arm   Patient Position: Sitting   Cuff Size: Large Pulse: 78   Temp: 98 3 °F (36 8 °C)   SpO2: 98%   Weight: 68 kg (150 lb)   Height: 5' 7" (1 702 m)           Physical Exam  Vitals and nursing note reviewed  Constitutional:       General: She is not in acute distress  Appearance: Normal appearance  HENT:      Head: Normocephalic and atraumatic  Right Ear: Tympanic membrane, ear canal and external ear normal  Tympanic membrane is not erythematous, retracted or bulging  Left Ear: Tympanic membrane, ear canal and external ear normal  Tympanic membrane is not erythematous, retracted or bulging  Nose: Rhinorrhea present  No mucosal edema  Mouth/Throat:      Mouth: Mucous membranes are moist       Pharynx: No oropharyngeal exudate or posterior oropharyngeal erythema  Eyes:      Conjunctiva/sclera: Conjunctivae normal    Cardiovascular:      Rate and Rhythm: Normal rate and regular rhythm  Pulmonary:      Effort: Pulmonary effort is normal  No respiratory distress  Breath sounds: Normal breath sounds  No decreased breath sounds, wheezing or rales  Musculoskeletal:        Legs:       Comments: Area of concern -- no erythema, warmth, rash  Sub-centimeter, soft, mobile, non-tender subcutaneous mass   Lymphadenopathy:      Cervical: No cervical adenopathy  Skin:     General: Skin is warm and dry  Neurological:      General: No focal deficit present  Mental Status: She is alert     Psychiatric:         Mood and Affect: Mood normal                     Amy Westbrook DO  [facility]   1/14/2022  11:50 AM

## 2022-01-22 ENCOUNTER — HOSPITAL ENCOUNTER (OUTPATIENT)
Dept: ULTRASOUND IMAGING | Facility: HOSPITAL | Age: 57
Discharge: HOME | End: 2022-01-22
Payer: COMMERCIAL

## 2022-01-22 DIAGNOSIS — R22.42 MASS OF LEFT LOWER LEG: ICD-10-CM

## 2022-01-22 PROCEDURE — 76882 US LMTD JT/FCL EVL NVASC XTR: CPT

## 2022-02-02 DIAGNOSIS — E78.2 MIXED HYPERLIPIDEMIA: ICD-10-CM

## 2022-02-02 RX ORDER — ROSUVASTATIN CALCIUM 10 MG/1
TABLET, COATED ORAL
Qty: 30 TABLET | Refills: 0 | Status: SHIPPED | OUTPATIENT
Start: 2022-02-02 | End: 2022-02-28

## 2022-02-28 DIAGNOSIS — E78.2 MIXED HYPERLIPIDEMIA: ICD-10-CM

## 2022-02-28 RX ORDER — ROSUVASTATIN CALCIUM 10 MG/1
TABLET, COATED ORAL
Qty: 30 TABLET | Refills: 0 | Status: SHIPPED | OUTPATIENT
Start: 2022-02-28 | End: 2022-04-10 | Stop reason: SDUPTHER

## 2022-03-17 ENCOUNTER — HOSPITAL ENCOUNTER (OUTPATIENT)
Dept: MAMMOGRAPHY | Facility: CLINIC | Age: 57
Discharge: HOME | End: 2022-03-17
Payer: COMMERCIAL

## 2022-03-17 VITALS — HEIGHT: 67 IN | BODY MASS INDEX: 23.54 KG/M2 | WEIGHT: 150 LBS

## 2022-03-17 DIAGNOSIS — Z12.31 ENCOUNTER FOR SCREENING MAMMOGRAM FOR MALIGNANT NEOPLASM OF BREAST: ICD-10-CM

## 2022-03-17 PROCEDURE — 77067 SCR MAMMO BI INCL CAD: CPT

## 2022-03-17 PROCEDURE — 77063 BREAST TOMOSYNTHESIS BI: CPT

## 2022-04-10 DIAGNOSIS — E78.2 MIXED HYPERLIPIDEMIA: ICD-10-CM

## 2022-04-11 RX ORDER — ROSUVASTATIN CALCIUM 10 MG/1
10 TABLET, COATED ORAL DAILY
Qty: 90 TABLET | Refills: 0 | Status: SHIPPED | OUTPATIENT
Start: 2022-04-11 | End: 2022-07-18

## 2022-04-11 RX ORDER — ROSUVASTATIN CALCIUM 10 MG/1
10 TABLET, COATED ORAL DAILY
Qty: 30 TABLET | Refills: 0 | Status: SHIPPED | OUTPATIENT
Start: 2022-04-11 | End: 2022-04-11 | Stop reason: SDUPTHER

## 2022-04-11 NOTE — TELEPHONE ENCOUNTER
From: Cindy Gaspar  To: Office of Sunshine Regan, [address]  Sent: 4/10/2022 9:15 AM EDT  Subject: Medication Renewal Request    Refills have been requested for the following medications: Other - If possible, please make quantity 90 tablets like other Rx      [facility]erred [facility]: [facility] [place] - [address]      Medication renewals requested in this message routed separately:     rosuvastatin (CRESTOR) 10 MG tablet [Amy Westbrook, DO]

## 2022-04-22 DIAGNOSIS — I10 ESSENTIAL HYPERTENSION: ICD-10-CM

## 2022-04-25 RX ORDER — LISINOPRIL 10 MG/1
TABLET ORAL
Qty: 90 TABLET | Refills: 0 | Status: SHIPPED | OUTPATIENT
Start: 2022-04-25 | End: 2022-07-29

## 2022-06-13 ENCOUNTER — OFFICE VISIT (OUTPATIENT)
Dept: OBGYN CLINIC | Facility: CLINIC | Age: 57
End: 2022-06-13
Payer: COMMERCIAL

## 2022-06-13 VITALS
HEIGHT: 67 IN | SYSTOLIC BLOOD PRESSURE: 110 MMHG | DIASTOLIC BLOOD PRESSURE: 80 MMHG | WEIGHT: 153.4 LBS | BODY MASS INDEX: 24.08 KG/M2

## 2022-06-13 DIAGNOSIS — Z01.419 ENCOUNTER FOR ANNUAL ROUTINE GYNECOLOGICAL EXAMINATION: Primary | ICD-10-CM

## 2022-06-13 DIAGNOSIS — Z12.31 ENCOUNTER FOR SCREENING MAMMOGRAM FOR MALIGNANT NEOPLASM OF BREAST: ICD-10-CM

## 2022-06-13 DIAGNOSIS — Z78.0 POST-MENOPAUSAL: ICD-10-CM

## 2022-06-13 PROCEDURE — 1036F TOBACCO NON-USER: CPT | Performed by: STUDENT IN AN ORGANIZED HEALTH CARE EDUCATION/TRAINING PROGRAM

## 2022-06-13 PROCEDURE — 3008F BODY MASS INDEX DOCD: CPT | Performed by: STUDENT IN AN ORGANIZED HEALTH CARE EDUCATION/TRAINING PROGRAM

## 2022-06-13 PROCEDURE — 99396 PREV VISIT EST AGE 40-64: CPT | Performed by: STUDENT IN AN ORGANIZED HEALTH CARE EDUCATION/TRAINING PROGRAM

## 2022-06-13 NOTE — PROGRESS NOTES
Assessment/Plan:    55 yo G0 - annual exam    F/u in 1 year or prn     Problem List Items Addressed This Visit        Other    Post-menopausal    Relevant Medications    conjugated estrogens (PREMARIN) vaginal cream      Other Visit Diagnoses     Encounter for annual routine gynecological examination    -  Primary  Pap due     Encounter for screening mammogram for malignant neoplasm of breast        Relevant Orders    Mammo screening bilateral w 3d & cad            Subjective:      Patient ID: Christiano Ruiz is a 56 y o  female  This is a 56 y o  postmenopausal G0 who presents for annual exam      Concerns: none    She denies vaginal bleeding, discharge, or pelvic pain  Sexually active: yes, w/o concerns  Topical premarin very helpful for dryness  STD testing: denies  Urinary concerns: none  Bowel movements: normal    Screening:  Last pap smear: 19-neg/neg  Last mammogram: 3/17/22-normal  Colon Cancer screenin/15/17-normal - repeat in 10 years      Family history:  Breast cancer: denies  Colon cancer: denies  Ovarian cancer: denies    Body mass index is 24 03 kg/m²  Exercise: yes  Diet: healthy  Smoking: former    Continues to work at [address]  The following portions of the patient's history were reviewed and updated as appropriate: allergies, current medications, past family history, past medical history, past social history, past surgical history and problem list     Review of Systems   Constitutional: Negative  HENT: Negative  Eyes: Negative  Respiratory: Negative  Cardiovascular: Negative  Gastrointestinal: Negative  Endocrine: Negative  Genitourinary: Negative for dyspareunia, dysuria, frequency, menstrual problem, pelvic pain, vaginal discharge and vaginal pain  Musculoskeletal: Negative  Skin: Negative  Allergic/Immunologic: Negative  Neurological: Negative  Hematological: Negative  Psychiatric/Behavioral: Negative  Objective:      /80 (BP Location: Left arm, Patient Position: Sitting, Cuff Size: Adult)   Ht 5' 7" (1 702 m)   Wt 69 6 kg (153 lb 6 4 oz)   LMP  (LMP Unknown)   BMI 24 03 kg/m²          Physical Exam  Vitals reviewed  Cardiovascular:      Rate and Rhythm: Normal rate  Pulmonary:      Effort: Pulmonary effort is normal    Chest:   Breasts: Breasts are symmetrical       Right: No mass, nipple discharge, skin change or tenderness  Left: No mass, nipple discharge, skin change or tenderness  Abdominal:      Palpations: Abdomen is soft  Genitourinary:     Labia:         Right: No rash  Left: No rash  Vagina: Normal  No signs of injury  Cervix: No cervical motion tenderness, discharge, friability, lesion, erythema or cervical bleeding  Uterus: Not deviated, not enlarged, not fixed and not tender  Adnexa:         Right: No mass, tenderness or fullness  Left: No mass, tenderness or fullness  Musculoskeletal:      Cervical back: Normal range of motion  Skin:     General: Skin is warm and dry  Neurological:      Mental Status: She is alert and oriented to person, place, and time

## 2022-06-13 NOTE — PROGRESS NOTES
Patient presents for a routine annual visit  Last Pap Smear- 19-neg/neg  LMP-postmenopausal  Mammogram-3/17/22-normal  Colonoscopy-5/15/17-normal, hyperplastic polyp-repeat in 10 yrs    Former smoker  Social drinker  Currently sexually active  No family history of uterine, ovarian, cervical, colon, or breast cancer     No concerns/questions for today's visit

## 2022-07-17 DIAGNOSIS — E78.2 MIXED HYPERLIPIDEMIA: ICD-10-CM

## 2022-07-18 RX ORDER — ROSUVASTATIN CALCIUM 10 MG/1
TABLET, COATED ORAL
Qty: 90 TABLET | Refills: 0 | Status: SHIPPED | OUTPATIENT
Start: 2022-07-18 | End: 2022-10-24

## 2022-07-29 DIAGNOSIS — I10 ESSENTIAL HYPERTENSION: ICD-10-CM

## 2022-07-29 RX ORDER — LISINOPRIL 10 MG/1
TABLET ORAL
Qty: 90 TABLET | Refills: 0 | Status: SHIPPED | OUTPATIENT
Start: 2022-07-29 | End: 2022-10-25

## 2022-08-18 ENCOUNTER — RA CDI HCC (OUTPATIENT)
Dept: OTHER | Facility: HOSPITAL | Age: 57
End: 2022-08-18

## 2022-08-18 NOTE — PROGRESS NOTES
[facility]  [facility] opportunities       Chart reviewed, no opportunity found: CHART REVIEWED, NO OPPORTUNITY FOUND        Patients Insurance        Commercial Insurance: [employer]

## 2022-08-29 ENCOUNTER — OFFICE VISIT (OUTPATIENT)
Dept: FAMILY MEDICINE CLINIC | Facility: CLINIC | Age: 57
End: 2022-08-29
Payer: COMMERCIAL

## 2022-08-29 VITALS
BODY MASS INDEX: 24.48 KG/M2 | SYSTOLIC BLOOD PRESSURE: 104 MMHG | OXYGEN SATURATION: 97 % | TEMPERATURE: 97.7 F | HEIGHT: 67 IN | WEIGHT: 156 LBS | HEART RATE: 81 BPM | DIASTOLIC BLOOD PRESSURE: 63 MMHG

## 2022-08-29 DIAGNOSIS — Z11.59 ENCOUNTER FOR HEPATITIS C SCREENING TEST FOR LOW RISK PATIENT: ICD-10-CM

## 2022-08-29 DIAGNOSIS — E78.2 MIXED HYPERLIPIDEMIA: ICD-10-CM

## 2022-08-29 DIAGNOSIS — Z79.899 LONG TERM USE OF DRUG: ICD-10-CM

## 2022-08-29 DIAGNOSIS — Z00.00 HEALTHCARE MAINTENANCE: Primary | ICD-10-CM

## 2022-08-29 DIAGNOSIS — I10 PRIMARY HYPERTENSION: ICD-10-CM

## 2022-08-29 PROCEDURE — 99396 PREV VISIT EST AGE 40-64: CPT | Performed by: FAMILY MEDICINE

## 2022-08-29 NOTE — PROGRESS NOTES
Jones Farnsworth 1965 female MRN: [id number]      ASSESSMENT/PLAN  Problem List Items Addressed This Visit        Cardiovascular and Mediastinum    HTN (hypertension)    Relevant Orders    Comprehensive metabolic panel    Lipid panel       Other    Mixed hyperlipidemia    Relevant Orders    Comprehensive metabolic panel    Lipid panel      Other Visit Diagnoses     Healthcare maintenance    -  Primary    Long term use of drug        Relevant Orders    CBC and differential    Encounter for hepatitis C screening test for low risk patient        Relevant Orders    Hepatitis C antibody        BP WNL   Update labs as above   Hep C screening ordered, pt agreeable    Pap UTD  Mammogram UTD  CRC UTD  Vaccinations: TDap UTD, Shingles UTD, COVID UTD  Encouraged regular physical activity, varied diet, and regular dental/eye exams     Future Appointments   Date Time Provider Cuauhtemoc Montero   10/13/2022 11:00 AM [facility]  SP[facility]   6/14/2023  3:20 PM Deonna Dukes MD [facility]          SUBJECTIVE  CC: Physical Exam      HPI:  Jones Farnsworth is a 56 y o  female who presents for annual physical  History reviewed and updated as below  No acute concerns  Review of Systems   Constitutional: Negative for unexpected weight change  HENT: Negative for congestion, ear pain, rhinorrhea and sore throat  Eyes: Negative for visual disturbance  Respiratory: Negative for cough and shortness of breath  Cardiovascular: Negative for chest pain, palpitations and leg swelling  Gastrointestinal: Negative for abdominal pain, constipation and diarrhea  Endocrine: Negative for polyuria  Genitourinary: Negative for dysuria and menstrual problem  Neurological: Negative for dizziness and headaches  Psychiatric/Behavioral: Negative for sleep disturbance         Historical Information   The patient history was reviewed and updated as follows:    Past Medical History: Diagnosis Date    Abnormal Pap smear of cervix     Hearing loss     Hyperlipemia     Hypertension     Urinary tract infection      Past Surgical History:   Procedure Laterality Date    APPENDECTOMY      KNEE SURGERY       Family History   Problem Relation Age of Onset    Heart disease Mother     Diabetes Mother     Kidney disease Mother     Angina Mother     Coronary artery disease Mother     Hyperlipidemia Mother     Dialysis Mother     Stroke Mother     Heart disease Father     Diabetes Father     Prostate cancer Father 76    Coronary artery disease Father     Cancer Father         bladder cancer    Hyperlipidemia Father     No Known Problems Sister     Coronary artery disease Brother     Diabetes Brother     Heart disease Brother     Hyperlipidemia Brother     No Known Problems Maternal Grandmother     Heart disease Paternal Grandmother     No Known Problems Paternal Aunt     No Known Problems Paternal Aunt     No Known Problems Paternal Aunt     Breast cancer Neg Hx     Colon cancer Neg Hx     Ovarian cancer Neg Hx     Cervical cancer Neg Hx     Uterine cancer Neg Hx       Social History   Social History     Substance and Sexual Activity   Alcohol Use Yes     Social History     Substance and Sexual Activity   Drug Use No     Social History     Tobacco Use   Smoking Status Former Smoker    Packs/day: 0 00    Start date: 1980   Maria Elena Gantkellie Quit date: 10/12/1990    Years since quittin 9   Smokeless Tobacco Never Used   Tobacco Comment    never a heavy user; social smoker       Medications:     Current Outpatient Medications:     aspirin 81 mg chewable tablet, Chew 81 mg daily, Disp: , Rfl:     conjugated estrogens (PREMARIN) vaginal cream, Use twice weekly  , Disp: 30 g, Rfl: 2    lisinopril (ZESTRIL) 10 mg tablet, TAKE 1 TABLET BY MOUTH EVERY DAY, Disp: 90 tablet, Rfl: 0    rosuvastatin (CRESTOR) 10 MG tablet, TAKE ONE TABLET BY MOUTH EVERY DAY, Disp: 90 tablet, Rfl: 0  Allergies   Allergen Reactions    Penicillins        OBJECTIVE    Vitals:   Vitals:    08/29/22 1121   BP: 104/63   Pulse: 81   Temp: 97 7 °F (36 5 °C)   SpO2: 97%   Weight: 70 8 kg (156 lb)   Height: 5' 7" (1 702 m)           Physical Exam  Vitals and nursing note reviewed  Constitutional:       General: She is not in acute distress  Appearance: Normal appearance  HENT:      Head: Normocephalic and atraumatic  Right Ear: Tympanic membrane, ear canal and external ear normal       Left Ear: Tympanic membrane, ear canal and external ear normal       Nose: Nose normal       Mouth/Throat:      Mouth: Mucous membranes are moist       Pharynx: No oropharyngeal exudate or posterior oropharyngeal erythema  Eyes:      Conjunctiva/sclera: Conjunctivae normal    Cardiovascular:      Rate and Rhythm: Normal rate and regular rhythm  Pulmonary:      Effort: Pulmonary effort is normal  No respiratory distress  Breath sounds: Normal breath sounds  Abdominal:      General: Bowel sounds are normal  There is no distension  Palpations: Abdomen is soft  Tenderness: There is no abdominal tenderness  Musculoskeletal:      Right lower leg: No edema  Left lower leg: No edema  Lymphadenopathy:      Cervical: No cervical adenopathy  Skin:     General: Skin is warm and dry  Neurological:      General: No focal deficit present  Mental Status: She is alert     Psychiatric:         Mood and Affect: Mood normal                     Amy Westbrook DO  [facility]   8/29/2022  11:38 AM

## 2022-10-14 ENCOUNTER — APPOINTMENT (OUTPATIENT)
Dept: LAB | Facility: HOSPITAL | Age: 57
End: 2022-10-14
Payer: COMMERCIAL

## 2022-10-14 DIAGNOSIS — Z79.899 LONG TERM USE OF DRUG: ICD-10-CM

## 2022-10-14 DIAGNOSIS — I10 PRIMARY HYPERTENSION: ICD-10-CM

## 2022-10-14 DIAGNOSIS — Z11.59 ENCOUNTER FOR HEPATITIS C SCREENING TEST FOR LOW RISK PATIENT: ICD-10-CM

## 2022-10-14 DIAGNOSIS — E78.2 MIXED HYPERLIPIDEMIA: ICD-10-CM

## 2022-10-14 LAB
ALBUMIN SERPL BCP-MCNC: 3.9 G/DL (ref 3.5–5)
ALP SERPL-CCNC: 97 U/L (ref 46–116)
ALT SERPL W P-5'-P-CCNC: 33 U/L (ref 12–78)
ANION GAP SERPL CALCULATED.3IONS-SCNC: 10 MMOL/L (ref 4–13)
AST SERPL W P-5'-P-CCNC: 23 U/L (ref 5–45)
BASOPHILS # BLD AUTO: 0.05 THOUSANDS/ΜL (ref 0–0.1)
BASOPHILS NFR BLD AUTO: 1 % (ref 0–1)
BILIRUB SERPL-MCNC: 0.43 MG/DL (ref 0.2–1)
BUN SERPL-MCNC: 15 MG/DL (ref 5–25)
CALCIUM SERPL-MCNC: 8.7 MG/DL (ref 8.3–10.1)
CHLORIDE SERPL-SCNC: 102 MMOL/L (ref 96–108)
CHOLEST SERPL-MCNC: 246 MG/DL
CO2 SERPL-SCNC: 28 MMOL/L (ref 21–32)
CREAT SERPL-MCNC: 0.7 MG/DL (ref 0.6–1.3)
EOSINOPHIL # BLD AUTO: 0.31 THOUSAND/ΜL (ref 0–0.61)
EOSINOPHIL NFR BLD AUTO: 4 % (ref 0–6)
ERYTHROCYTE [DISTWIDTH] IN BLOOD BY AUTOMATED COUNT: 13.2 % (ref 11.6–15.1)
GFR SERPL CREATININE-BSD FRML MDRD: 97 ML/MIN/1.73SQ M
GLUCOSE P FAST SERPL-MCNC: 98 MG/DL (ref 65–99)
HCT VFR BLD AUTO: 39.8 % (ref 34.8–46.1)
HCV AB SER QL: NORMAL
HDLC SERPL-MCNC: 66 MG/DL
HGB BLD-MCNC: 12.8 G/DL (ref 11.5–15.4)
IMM GRANULOCYTES # BLD AUTO: 0.02 THOUSAND/UL (ref 0–0.2)
IMM GRANULOCYTES NFR BLD AUTO: 0 % (ref 0–2)
LDLC SERPL CALC-MCNC: 151 MG/DL (ref 0–100)
LYMPHOCYTES # BLD AUTO: 1.91 THOUSANDS/ΜL (ref 0.6–4.47)
LYMPHOCYTES NFR BLD AUTO: 25 % (ref 14–44)
MCH RBC QN AUTO: 29.6 PG (ref 26.8–34.3)
MCHC RBC AUTO-ENTMCNC: 32.2 G/DL (ref 31.4–37.4)
MCV RBC AUTO: 92 FL (ref 82–98)
MONOCYTES # BLD AUTO: 0.49 THOUSAND/ΜL (ref 0.17–1.22)
MONOCYTES NFR BLD AUTO: 6 % (ref 4–12)
NEUTROPHILS # BLD AUTO: 5.03 THOUSANDS/ΜL (ref 1.85–7.62)
NEUTS SEG NFR BLD AUTO: 64 % (ref 43–75)
NONHDLC SERPL-MCNC: 180 MG/DL
NRBC BLD AUTO-RTO: 0 /100 WBCS
PLATELET # BLD AUTO: 301 THOUSANDS/UL (ref 149–390)
PMV BLD AUTO: 11.1 FL (ref 8.9–12.7)
POTASSIUM SERPL-SCNC: 3.9 MMOL/L (ref 3.5–5.3)
PROT SERPL-MCNC: 7.4 G/DL (ref 6.4–8.4)
RBC # BLD AUTO: 4.33 MILLION/UL (ref 3.81–5.12)
SODIUM SERPL-SCNC: 140 MMOL/L (ref 135–147)
TRIGL SERPL-MCNC: 143 MG/DL
WBC # BLD AUTO: 7.81 THOUSAND/UL (ref 4.31–10.16)

## 2022-10-14 PROCEDURE — 36415 COLL VENOUS BLD VENIPUNCTURE: CPT

## 2022-10-14 PROCEDURE — 85025 COMPLETE CBC W/AUTO DIFF WBC: CPT

## 2022-10-14 PROCEDURE — 80061 LIPID PANEL: CPT

## 2022-10-14 PROCEDURE — 80053 COMPREHEN METABOLIC PANEL: CPT

## 2022-10-14 PROCEDURE — 86803 HEPATITIS C AB TEST: CPT

## 2022-10-24 DIAGNOSIS — I10 ESSENTIAL HYPERTENSION: ICD-10-CM

## 2022-10-24 DIAGNOSIS — E78.2 MIXED HYPERLIPIDEMIA: ICD-10-CM

## 2022-10-24 RX ORDER — ROSUVASTATIN CALCIUM 10 MG/1
TABLET, COATED ORAL
Qty: 90 TABLET | Refills: 0 | Status: SHIPPED | OUTPATIENT
Start: 2022-10-24

## 2022-10-25 RX ORDER — LISINOPRIL 10 MG/1
TABLET ORAL
Qty: 90 TABLET | Refills: 0 | Status: SHIPPED | OUTPATIENT
Start: 2022-10-25

## 2022-11-21 DIAGNOSIS — Z78.0 POST-MENOPAUSAL: ICD-10-CM

## 2022-12-22 ENCOUNTER — CLINICAL SUPPORT (OUTPATIENT)
Dept: FAMILY MEDICINE CLINIC | Facility: CLINIC | Age: 57
End: 2022-12-22

## 2022-12-22 DIAGNOSIS — R30.0 DYSURIA: Primary | ICD-10-CM

## 2022-12-22 LAB
SL AMB  POCT GLUCOSE, UA: NEGATIVE
SL AMB LEUKOCYTE ESTERASE,UA: NEGATIVE
SL AMB POCT BILIRUBIN,UA: NEGATIVE
SL AMB POCT BLOOD,UA: ABNORMAL
SL AMB POCT KETONES,UA: NEGATIVE
SL AMB POCT NITRITE,UA: NEGATIVE
SL AMB POCT PH,UA: 6
SL AMB POCT SPECIFIC GRAVITY,UA: <=1.005
SL AMB POCT URINE PROTEIN: NEGATIVE
SL AMB POCT UROBILINOGEN: ABNORMAL

## 2022-12-24 LAB — BACTERIA UR CULT: NORMAL

## 2023-01-23 DIAGNOSIS — I10 ESSENTIAL HYPERTENSION: ICD-10-CM

## 2023-01-23 DIAGNOSIS — E78.2 MIXED HYPERLIPIDEMIA: ICD-10-CM

## 2023-01-23 RX ORDER — ROSUVASTATIN CALCIUM 10 MG/1
TABLET, COATED ORAL
Qty: 90 TABLET | Refills: 0 | Status: SHIPPED | OUTPATIENT
Start: 2023-01-23

## 2023-01-23 RX ORDER — LISINOPRIL 10 MG/1
TABLET ORAL
Qty: 90 TABLET | Refills: 0 | Status: SHIPPED | OUTPATIENT
Start: 2023-01-23

## 2023-03-27 ENCOUNTER — VBI (OUTPATIENT)
Dept: ADMINISTRATIVE | Facility: OTHER | Age: 58
End: 2023-03-27

## 2023-06-06 ENCOUNTER — OFFICE VISIT (OUTPATIENT)
Age: 58
End: 2023-06-06
Payer: COMMERCIAL

## 2023-06-06 VITALS — WEIGHT: 155 LBS | HEIGHT: 67 IN | BODY MASS INDEX: 24.33 KG/M2

## 2023-06-06 DIAGNOSIS — D22.9 MULTIPLE NEVI: Primary | ICD-10-CM

## 2023-06-06 DIAGNOSIS — D18.01 CHERRY ANGIOMA: ICD-10-CM

## 2023-06-06 DIAGNOSIS — L82.1 SEBORRHEIC KERATOSES: ICD-10-CM

## 2023-06-06 PROCEDURE — 99203 OFFICE O/P NEW LOW 30 MIN: CPT | Performed by: DERMATOLOGY

## 2023-06-06 NOTE — PROGRESS NOTES
"          [facility] Note     Patient Name: Clyde Ramos  Encounter Date: June 6, 2023      Have you been cared for by a [facility]ologist in the last 3 years and, if so, which description applies to you?     NO  I am considered a \"new\" patient and must complete all patient intake questions  I am FEMALE/of child-bearing potential     REVIEW OF SYSTEMS:  Have you recently had or currently have any of the following? • Recent fever or chills? No  • Any non-healing wound? No  • Are you pregnant or planning to become pregnant? No  • Are you currently or planning to be nursing or breast feeding? No   PAST MEDICAL HISTORY:  Have you personally ever had or currently have any of the following? If \"YES,\" then please provide more detail  • Skin cancer (such as Melanoma, Basal Cell Carcinoma, Squamous Cell Carcinoma? No  • Tuberculosis, HIV/AIDS, Hepatitis B or C: No  • Systemic Immunosuppression such as Diabetes, Biologic or Immunotherapy, Chemotherapy, Organ Transplantation, Bone Marrow Transplantation No  • Radiation Treatment No   FAMILY HISTORY:  Any \"first degree relatives\" (parent, brother, sister, or child) with the following? • Skin Cancer, Pancreatic or Other Cancer? No   PATIENT EXPERIENCE:    • Do you want the Dermatologist to perform a COMPLETE skin exam today including a clinical examination under the \"bra and underwear\" areas? Yes  • If necessary, do we have your permission to call and leave a detailed message on your Preferred Phone number that includes your specific medical information? Yes           Allergies   Allergen Reactions   • Penicillins         Current Outpatient Medications:   •  aspirin 81 mg chewable tablet, Chew 81 mg daily, Disp: , Rfl:   •  conjugated estrogens (PREMARIN) vaginal cream, Use twice weekly  , Disp: 30 g, Rfl: 2  •  lisinopril (ZESTRIL) 10 mg tablet, TAKE ONE TABLET BY MOUTH EVERY DAY, Disp: 90 tablet, Rfl: 0  •  rosuvastatin (CRESTOR) 10 MG " "tablet, Take 1 tablet (10 mg total) by mouth daily, Disp: 90 tablet, Rfl: 0            • Whom besides the patient is providing clinical information about today's encounter?   ? NO ADDITIONAL HISTORIAN (patient alone provided history)     Physical Exam and Assessment/Plan by Diagnosis:     MELANOCYTIC NEVI (\"Moles\")     Physical Exam:  • Anatomic Location Affected: Scattered areas of the trunk and extremities  • Morphological Description:  Scattered, 1-4mm round to ovoid, symmetrical-appearing, even bordered, skin colored to dark brown macules/papules, mostly in sun-exposed areas     Additional History of Present Condition:  Found on exam     Assessment and Plan:  Based on a thorough discussion of this condition and the management approach to it (including a comprehensive discussion of the known risks, side effects and potential benefits of treatment), the patient (family) agrees to implement the following specific plan:  • Provided handout with information regarding the ABCDE's of moles   • Recommend routine skin exams every year   • Sun avoidance, protective clothing (known as UPF clothing), and the use of at least SPF 30 sunscreens is advised  Sunscreen should be reapplied every two hours when outside       SEBORRHEIC KERATOSIS; NON-INFLAMED     Physical Exam:  • Anatomic Location Affected:  scattered across trunk, extremities  • Morphological Description:  Flat and raised, waxy, smooth to warty textured, yellow to brownish-grey to dark brown to blackish, discrete, \"stuck-on\" appearing papules      Additional History of Present Condition:  Patient reports new bumps on the skin  Denies itch, burn, pain, bleeding or ulceration  Present constantly; nothing seems to make it worse or better    No prior treatment        Assessment and Plan:  Based on a thorough discussion of this condition and the management approach to it (including a comprehensive discussion of the known risks, side effects and potential benefits of " "treatment), the patient (family) agrees to implement the following specific plan:  • Reassured benign           ANGIOMA (\"CHERRY ANGIOMA\")    Physical Exam:  • Anatomic Location: scattered across sun exposed areas of the trunk and extremities   • Morphologic Description: Firm red to reddish-blue discrete papules     Additional History of Present Condition:  Present on exam       Assessment and Plan:  • Reassured benign                Scribe Attestation    I,:  Clark Christy am acting as a scribe while in the presence of the attending physician :       I,:  Giselle Faria MD personally performed the services described in this documentation    as scribed in my presence  :                                      "

## 2023-06-06 NOTE — PATIENT INSTRUCTIONS
"MELANOCYTIC NEVI (\"Moles\")    Melanocytic nevi (\"moles\") are tan or brown, raised or flat areas of the skin which have an increased number of melanocytes  Melanocytes are the cells in our body which make pigment and account for skin color  Some moles are present at birth (I e , \"congenital nevi\"), while others come up later in life (i e , \"acquired nevi\")  The sun can stimulate the body to make more moles  Sunburns are not the only thing that triggers more moles  Chronic sun exposure can do it too  Clinically distinguishing a healthy mole from melanoma may be difficult, even for experienced dermatologists  The \"ABCDE's\" of moles have been suggested as a means of helping to alert a person to a suspicious mole and the possible increased risk of melanoma  The suggestions for raising alert are as follows:    Asymmetry: Healthy moles tend to be symmetric, while melanomas are often asymmetric  Asymmetry means if you draw a line through the mole, the two halves do not match in color, size, shape, or surface texture  Asymmetry can be a result of rapid enlargement of a mole, the development of a raised area on a previously flat lesion, scaling, ulceration, bleeding or scabbing within the mole  Any mole that starts to demonstrate \"asymmetry\" should be examined promptly by a board certified dermatologist      Border: Healthy moles tend to have discrete, even borders  The border of a melanoma often blends into the normal skin and does not sharply delineate the mole from normal skin  Any mole that starts to demonstrate \"uneven borders\" should be examined promptly by a board certified dermatologist      Color: Healthy moles tend to be one color throughout  Melanomas tend to be made up of different colors ranging from dark black, blue, white, or red    Any mole that demonstrates a color change should be examined promptly by a board certified dermatologist      Diameter: Healthy moles tend to be smaller than 0 6 cm " "in size; an exception are \"congenital nevi\" that can be larger  Melanomas tend to grow and can often be greater than 0 6 cm (1/4 of an inch, or the size of a pencil eraser)  This is only a guideline, and many normal moles may be larger than 0 6 cm without being unhealthy  Any mole that starts to change in size (small to bigger or bigger to smaller) should be examined promptly by a board certified dermatologist      Evolving: Healthy moles tend to \"stay the same  \"  Melanomas may often show signs of change or evolution such as a change in size, shape, color, or elevation  Any mole that starts to itch, bleed, crust, burn, hurt, or ulcerate or demonstrate a change or evolution should be examined promptly by a board certified dermatologist       Dysplastic Nevi  Dysplastic moles are moles that fit the ABCDE rules of melanoma but are not identified as melanomas when examined under the microscope  They may indicate an increased risk of melanoma in that person  If there is a family history of melanoma, most experts agree that the person may be at an increased risk for developing a melanoma  Experts still do not agree on what dysplastic moles mean in patients without a personal or family history of melanoma  Dysplastic moles are usually larger than common moles and have different colors within it with irregular borders  The appearance can be very similar to a melanoma  Biopsies of dysplastic moles may show abnormalities which are different from a regular mole  Melanoma  Malignant melanoma is a type of skin cancer that can be deadly if it spreads throughout the body  The incidence of melanoma in the [place] is growing faster than any other cancer  Melanoma usually grows near the surface of the skin for a period of time, and then begins to grow deeper into the skin  Once it grows deeper into the skin, the risk of spread to other organs greatly increases   Therefore, early detection and removal of a malignant " "melanoma may result in a better chance at a complete cure; removal after the tumor has spread may not be as effective, leading to worse clinical outcomes such as death  The true rate of nevus transformation into a melanoma is unknown  It has been estimated that the lifetime risk for any acquired melanocytic nevus on any 21year-old individual transforming into melanoma by age [de-identified] is 0 03% (1 in 3,164) for men and 0 009% (1 in 10,800) for women  The appearance of a \"new mole\" remains one of the most reliable methods for identifying a malignant melanoma  Occasionally, melanomas appear as rapidly growing, blue-black, dome-shaped bumps within a previous mole or previous area of normal skin  Other times, melanomas are suspected when a mole suddenly appears or changes  Itching, burning, or pain in a pigmented lesion should increase suspicion, but most patients with early melanoma have no skin discomfort whatsoever  Melanoma can occur anywhere on the skin, including areas that are difficult for self-examination  Many melanomas are first noticed by other family members  Suspicious-looking moles may be removed for microscopic examination  You may be able to prevent death from melanoma by doing two simple things:    Try to avoid unnecessary sun exposure and protect your skin when it is exposed to the sun  People who live near the equator, people who have intermittent exposures to large amounts of sun, and people who have had sunburns in childhood or adolescence have an increased risk for melanoma  Sun sense and vigilant sun protection may be keys to helping to prevent melanoma  We recommend wearing UPF-rated sun protective clothing and sunglasses whenever possible and applying a moisturizer-sunscreen combination product (SPF 50+) such as Neutrogena Daily Defense to sun exposed areas of skin at least three times a day      Have your moles regularly examined by a board certified dermatologist AND by yourself or " "a family member/friend at home  We recommend that you have your moles examined at least once a year by a board certified dermatologist   Use your birthday as an annual reminder to have your \"Birthday Suit\" (I e , your skin) examined; it is a nice birthday gift to yourself to know that your skin is healthy appearing! Additionally, at-home self examinations may be helpful for detecting a possible melanoma  Use the ABCDEs we discussed and check your moles once a month at home  SEBORRHEIC KERATOSIS  A seborrheic keratosis is a harmless warty spot that appears during adult life as a common sign of skin aging  Seborrheic keratoses can arise on any area of skin, covered or uncovered, with the usual exception of the palms and soles  They do not arise from mucous membranes  Seborrheic keratoses can have highly variable appearance  Seborrheic keratoses are extremely common  It has been estimated that over 90% of adults over the age of 61 years have one or more of them  They occur in males and females of all races, typically beginning to erupt in the 35s or 45s  They are uncommon under the age of 21 years  The precise cause of seborrhoeic keratoses is not known  Seborrhoeic keratoses are considered degenerative in nature  As time goes by, seborrheic keratoses tend to become more numerous  Some people inherit a tendency to develop a very large number of them; some people may have hundreds of them  The name \"seborrheic keratosis\" is misleading, because these lesions are not limited to a seborrhoeic distribution (scalp, mid-face, chest, upper back), nor are they formed from sebaceous glands, nor are they associated with sebum -- which is greasy  Seborrheic keratosis may also be called \"SK,\" \"Seb K,\" \"basal cell papilloma,\" \"senile wart,\" or \"barnacle  \"      There is no easy way to remove multiple lesions on a single occasion    Unless a specific lesion is \"inflamed\" and is causing pain or stinging/burning or " "is bleeding, most insurance companies do not authorize treatment  ANGIOMA (\"CHERRY ANGIOMA\")  [place] angiomas markedly increase in number from about the age of 36, so it has been estimated that 75% of people over 76years of age have them  Although they also called \"senile angiomas,\" they can occur in young people too - 5% of adolescents have been found to have them  Cherry angiomas are very common in males and females of any age or race, with no difference in sexes or races affected  They are however more noticeable in white skin than in skin of colour  There may be a family history of similar lesions  Eruptive (very large number appearing in a short period of time) cherry angiomas have been rarely reported to be associated with internal malignancy and pregnancy         "

## 2023-06-06 NOTE — PROGRESS NOTES
"[facility]     Patient Name: Mariaelena Tavarez  Encounter Date: June 6, 2023     Have you been cared for by a [place] [id number] Dermatologist in the last 3 years and, if so, which description applies to you? Yes  I have been here within the last 3 years, and my medical history has NOT changed since that time  I am FEMALE/of child-bearing potential     REVIEW OF SYSTEMS:  Have you recently had or currently have any of the following? · No changes in my recent health  PAST MEDICAL HISTORY:  Have you personally ever had or currently have any of the following? If \"YES,\" then please provide more detail  · No changes in my medical history  FAMILY HISTORY:  Any \"first degree relatives\" (parent, brother, sister, or child) with the following? • No changes in my family's known health  PATIENT EXPERIENCE:    • Do you want the Dermatologist to perform a COMPLETE skin exam today including a clinical examination under the \"bra and underwear\" areas? Yes  • If necessary, do we have your permission to call and leave a detailed message on your Preferred Phone number that includes your specific medical information? Yes      Allergies   Allergen Reactions   • Penicillins       Current Outpatient Medications:   •  aspirin 81 mg chewable tablet, Chew 81 mg daily, Disp: , Rfl:   •  conjugated estrogens (PREMARIN) vaginal cream, Use twice weekly  , Disp: 30 g, Rfl: 2  •  lisinopril (ZESTRIL) 10 mg tablet, TAKE ONE TABLET BY MOUTH EVERY DAY, Disp: 90 tablet, Rfl: 0  •  rosuvastatin (CRESTOR) 10 MG tablet, Take 1 tablet (10 mg total) by mouth daily, Disp: 90 tablet, Rfl: 0          • Whom besides the patient is providing clinical information about today's encounter?   o NO ADDITIONAL HISTORIAN (patient alone provided history)    Physical Exam and Assessment/Plan by Diagnosis:              Scribe Attestation    I,:  Ember Banerjee am acting as a scribe while in the presence of the attending physician :       I,: " Melvi Cruz MD personally performed the services described in this documentation    as scribed in my presence :

## 2023-06-16 ENCOUNTER — ANNUAL EXAM (OUTPATIENT)
Dept: OBGYN CLINIC | Facility: MEDICAL CENTER | Age: 58
End: 2023-06-16
Payer: COMMERCIAL

## 2023-06-16 VITALS
DIASTOLIC BLOOD PRESSURE: 82 MMHG | HEIGHT: 66 IN | SYSTOLIC BLOOD PRESSURE: 122 MMHG | BODY MASS INDEX: 24.4 KG/M2 | WEIGHT: 151.8 LBS

## 2023-06-16 DIAGNOSIS — Z12.31 ENCOUNTER FOR SCREENING MAMMOGRAM FOR BREAST CANCER: ICD-10-CM

## 2023-06-16 DIAGNOSIS — Z01.411 ENCNTR FOR GYN EXAM (GENERAL) (ROUTINE) W ABNORMAL FINDINGS: Primary | ICD-10-CM

## 2023-06-16 DIAGNOSIS — N95.2 VAGINAL ATROPHY: ICD-10-CM

## 2023-06-16 DIAGNOSIS — N39.3 SUI (STRESS URINARY INCONTINENCE, FEMALE): ICD-10-CM

## 2023-06-16 PROCEDURE — 99396 PREV VISIT EST AGE 40-64: CPT | Performed by: NURSE PRACTITIONER

## 2023-06-16 RX ORDER — CONJUGATED ESTROGENS 0.62 MG/G
0.5 CREAM VAGINAL 2 TIMES WEEKLY
Qty: 30 G | Refills: 1 | Status: SHIPPED | OUTPATIENT
Start: 2023-06-19

## 2023-06-16 NOTE — PROGRESS NOTES
Assessment/Plan:  Calcium 4498-9029 mg (in divided doses-max 600 mg at one time) + 600-1000 IU Vit D daily  Exercise 150 minutes per week minimum including weight bearing exercises  Pap with high risk HPV Q 5 years, if normal  Due 2024   Annual mammogram ordered and monthly breast self exam recommended  Colonoscopy- Due 2027         Kegels 20 times twice daily  Referred to pelvic floor PT for ISA  UA CS ordered for ISA  Silicone based lubricant with sex  (Use water based lubricant with condoms or sexual toys )    Vaginal moisturizers twice weekly as needed  Premarin rx was sent to pharmacy on file  Aware of benefits, risks and alternatives  Return to office in one year or sooner, if needed  1  Encntr for gyn exam (general) (routine) w abnormal findings    2  Encounter for screening mammogram for breast cancer  -     Mammo screening bilateral w 3d & cad; Future    3  Vaginal atrophy  -     estrogens, conjugated (Premarin) vaginal cream; Insert 0 5 g into the vagina 2 (two) times a week  -     Ambulatory referral to Physical Therapy; Future    4  ISA (stress urinary incontinence, female)  -     Ambulatory referral to Physical Therapy; Future  -     Urinalysis with microscopic; Future  -     Urine culture; Future               Subjective:      Patient ID: Nieves Meeks is a 66 y o  female  HPI    Nieves Meeks is a 66 y o  Damion Da Silva (  ) female who is here today for her annual visit  No gynecologic health concerns  Stable medical concerns  Postmenopausal with no vaginal bleeding  Exercise- no  Works controller [employer]  Nieves Meeks is sexually active with male partner/  of 34 years  Monogamous and feels safe in this relationship  Denies vaginal pain,bleeding or dryness  Premarin relieves the vaginal dryness  She uses nothing  for contraception  She is not interested in STD screening today  She denies vaginal discharge, itching or pelvic pain     She has no "urinary concerns, does have stress urinary incontinence since 1/23  No bowel concerns  No breast concerns  Last pap: 12/04/2019 normal pap with negative HR HPV  DEXA scan: N/A  Mammogram: 03/17/2022 heterogenously dense breast tissue  LTC 11 34%  Colonoscopy: 05/15/2017 recall 5-10 years    Family history of cancer:   Cancer-related family history includes Cancer in her father; Prostate cancer (age of onset: 76) in her father  There is no history of Breast cancer, Colon cancer, Ovarian cancer, Cervical cancer, or Uterine cancer  The following portions of the patient's history were reviewed and updated as appropriate: allergies, current medications, past family history, past medical history, past social history, past surgical history and problem list     Review of Systems   Constitutional: Negative  Negative for activity change, appetite change, chills, diaphoresis, fatigue, fever and unexpected weight change  HENT: Negative for congestion, dental problem, sneezing, sore throat and trouble swallowing  Eyes: Negative for visual disturbance  Respiratory: Negative for chest tightness and shortness of breath  Cardiovascular: Negative for chest pain and leg swelling  Gastrointestinal: Negative for abdominal pain, constipation, diarrhea, nausea and vomiting  Genitourinary: Negative for difficulty urinating, dyspareunia, dysuria, frequency, hematuria, pelvic pain, urgency, vaginal bleeding, vaginal discharge and vaginal pain  ISA   Musculoskeletal: Negative for back pain and neck pain  Skin: Negative  Allergic/Immunologic: Negative  Neurological: Negative for weakness and headaches  Hematological: Negative for adenopathy  Psychiatric/Behavioral: Negative            Objective:      /82 (BP Location: Left arm, Patient Position: Sitting, Cuff Size: Standard)   Ht 5' 6 25\" (1 683 m)   Wt 68 9 kg (151 lb 12 8 oz)   LMP  (LMP Unknown)   BMI 24 32 kg/m²          Physical " Exam  Vitals and nursing note reviewed  Constitutional:       Appearance: Normal appearance  She is well-developed  HENT:      Head: Normocephalic and atraumatic  Eyes:      General:         Right eye: No discharge  Left eye: No discharge  Neck:      Thyroid: No thyromegaly  Trachea: Trachea normal    Cardiovascular:      Rate and Rhythm: Normal rate and regular rhythm  Heart sounds: Normal heart sounds  Pulmonary:      Effort: Pulmonary effort is normal       Breath sounds: Normal breath sounds  Chest:   Breasts:     Breasts are symmetrical       Right: Normal  No inverted nipple, mass, nipple discharge, skin change or tenderness  Left: Normal  No inverted nipple, mass, nipple discharge, skin change or tenderness  Abdominal:      Palpations: Abdomen is soft  Genitourinary:     Exam position: Lithotomy position  Labia:         Right: No rash, tenderness, lesion or injury  Left: No rash, tenderness, lesion or injury  Urethra: No prolapse, urethral pain, urethral swelling or urethral lesion  Vagina: Normal  No signs of injury and foreign body  No vaginal discharge, erythema, tenderness or bleeding  Cervix: Normal       Uterus: Normal        Adnexa:         Right: No mass, tenderness or fullness  Left: No mass, tenderness or fullness  Rectum: No external hemorrhoid  Comments: Vulvovaginal atrophy  Musculoskeletal:         General: Normal range of motion  Cervical back: Normal range of motion and neck supple  Lymphadenopathy:      Head:      Right side of head: No submental, submandibular or tonsillar adenopathy  Left side of head: No submental, submandibular or tonsillar adenopathy  Cervical: No cervical adenopathy  Upper Body:      Right upper body: No supraclavicular or axillary adenopathy  Left upper body: No supraclavicular or axillary adenopathy  Lower Body: No right inguinal adenopathy   No left inguinal adenopathy  Skin:     General: Skin is warm and dry  Neurological:      Mental Status: She is alert and oriented to person, place, and time     Psychiatric:         Mood and Affect: Mood normal          Behavior: Behavior normal

## 2023-06-16 NOTE — PATIENT INSTRUCTIONS
Calcium 8911-7987 mg (in divided doses-max 600 mg at one time) + 600-1000 IU Vit D daily  Exercise 150 minutes per week minimum including weight bearing exercises  Pap with high risk HPV Q 5 years, if normal  Due 2024   Annual mammogram ordered and monthly breast self exam recommended  Colonoscopy- Due 2027         Kegels 20 times twice daily  Referred to pelvic floor PT for ISA  Silicone based lubricant with sex  (Use water based lubricant with condoms or sexual toys )    Vaginal moisturizers twice weekly as needed  Premarin rx was sent to pharmacy on file  Aware of benefits, risks and alternatives  Return to office in one year or sooner, if needed

## 2023-07-03 ENCOUNTER — HOSPITAL ENCOUNTER (OUTPATIENT)
Age: 58
Discharge: HOME | End: 2023-07-03
Payer: COMMERCIAL

## 2023-07-03 DIAGNOSIS — Z12.31 ENCOUNTER FOR SCREENING MAMMOGRAM FOR BREAST CANCER: ICD-10-CM

## 2023-07-03 PROCEDURE — 77063 BREAST TOMOSYNTHESIS BI: CPT

## 2023-07-03 PROCEDURE — 77067 SCR MAMMO BI INCL CAD: CPT

## 2023-07-18 DIAGNOSIS — I10 ESSENTIAL HYPERTENSION: ICD-10-CM

## 2023-07-19 RX ORDER — LISINOPRIL 10 MG/1
TABLET ORAL
Qty: 90 TABLET | Refills: 0 | Status: SHIPPED | OUTPATIENT
Start: 2023-07-19

## 2023-07-28 DIAGNOSIS — E78.2 MIXED HYPERLIPIDEMIA: ICD-10-CM

## 2023-07-28 RX ORDER — ROSUVASTATIN CALCIUM 10 MG/1
10 TABLET, COATED ORAL DAILY
Qty: 90 TABLET | Refills: 0 | Status: SHIPPED | OUTPATIENT
Start: 2023-07-28

## 2023-09-01 ENCOUNTER — HOSPITAL ENCOUNTER (INPATIENT)
Facility: HOSPITAL | Age: 58
LOS: 2 days | Discharge: HOME | DRG: 378 | End: 2023-09-03
Attending: EMERGENCY MEDICINE | Admitting: INTERNAL MEDICINE
Payer: COMMERCIAL

## 2023-09-01 DIAGNOSIS — K25.3 ACUTE PREPYLORIC ULCER: ICD-10-CM

## 2023-09-01 DIAGNOSIS — K92.2 GI BLEED: Primary | ICD-10-CM

## 2023-09-01 PROBLEM — R65.10 SIRS (SYSTEMIC INFLAMMATORY RESPONSE SYNDROME) (HCC): Status: ACTIVE | Noted: 2023-09-01

## 2023-09-01 PROBLEM — K92.1 MELENA: Status: ACTIVE | Noted: 2023-09-01

## 2023-09-01 PROBLEM — E87.6 HYPOKALEMIA: Status: ACTIVE | Noted: 2023-09-01

## 2023-09-01 PROBLEM — D64.9 ANEMIA: Status: ACTIVE | Noted: 2023-09-01

## 2023-09-01 LAB
ABO GROUP BLD: NORMAL
ALBUMIN SERPL BCP-MCNC: 4.7 G/DL (ref 3.5–5)
ALP SERPL-CCNC: 55 U/L (ref 34–104)
ALT SERPL W P-5'-P-CCNC: 12 U/L (ref 7–52)
ANION GAP SERPL CALCULATED.3IONS-SCNC: 15 MMOL/L
AST SERPL W P-5'-P-CCNC: 12 U/L (ref 13–39)
BASOPHILS # BLD MANUAL: 0.16 THOUSAND/UL (ref 0–0.1)
BASOPHILS NFR MAR MANUAL: 1 % (ref 0–1)
BILIRUB SERPL-MCNC: 0.46 MG/DL (ref 0.2–1)
BLD GP AB SCN SERPL QL: NEGATIVE
BUN SERPL-MCNC: 32 MG/DL (ref 5–25)
CALCIUM SERPL-MCNC: 9.6 MG/DL (ref 8.4–10.2)
CHLORIDE SERPL-SCNC: 102 MMOL/L (ref 96–108)
CO2 SERPL-SCNC: 21 MMOL/L (ref 21–32)
CREAT SERPL-MCNC: 0.82 MG/DL (ref 0.6–1.3)
EOSINOPHIL # BLD MANUAL: 0.16 THOUSAND/UL (ref 0–0.4)
EOSINOPHIL NFR BLD MANUAL: 1 % (ref 0–6)
ERYTHROCYTE [DISTWIDTH] IN BLOOD BY AUTOMATED COUNT: 12.9 % (ref 11.6–15.1)
GFR SERPL CREATININE-BSD FRML MDRD: 80 ML/MIN/1.73SQ M
GLUCOSE SERPL-MCNC: 140 MG/DL (ref 65–140)
HCT VFR BLD AUTO: 28.9 % (ref 34.8–46.1)
HGB BLD-MCNC: 9.4 G/DL (ref 11.5–15.4)
LIPASE SERPL-CCNC: 16 U/L (ref 11–82)
LYMPHOCYTES # BLD AUTO: 22 % (ref 14–44)
LYMPHOCYTES # BLD AUTO: 3.54 THOUSAND/UL (ref 0.6–4.47)
MCH RBC QN AUTO: 29.7 PG (ref 26.8–34.3)
MCHC RBC AUTO-ENTMCNC: 32.5 G/DL (ref 31.4–37.4)
MCV RBC AUTO: 92 FL (ref 82–98)
MONOCYTES # BLD AUTO: 0.64 THOUSAND/UL (ref 0–1.22)
MONOCYTES NFR BLD: 4 % (ref 4–12)
NEUTROPHILS # BLD MANUAL: 11.58 THOUSAND/UL (ref 1.85–7.62)
NEUTS SEG NFR BLD AUTO: 72 % (ref 43–75)
PLATELET # BLD AUTO: 368 THOUSANDS/UL (ref 149–390)
PLATELET BLD QL SMEAR: ADEQUATE
PMV BLD AUTO: 11.5 FL (ref 8.9–12.7)
POTASSIUM SERPL-SCNC: 3.3 MMOL/L (ref 3.5–5.3)
PROCALCITONIN SERPL-MCNC: 0.07 NG/ML
PROT SERPL-MCNC: 7.4 G/DL (ref 6.4–8.4)
RBC # BLD AUTO: 3.16 MILLION/UL (ref 3.81–5.12)
RBC MORPH BLD: PRESENT
RH BLD: POSITIVE
SODIUM SERPL-SCNC: 138 MMOL/L (ref 135–147)
SPECIMEN EXPIRATION DATE: NORMAL
STOMATOCYTES BLD QL SMEAR: PRESENT
WBC # BLD AUTO: 16.09 THOUSAND/UL (ref 4.31–10.16)

## 2023-09-01 PROCEDURE — 86900 BLOOD TYPING SEROLOGIC ABO: CPT | Performed by: EMERGENCY MEDICINE

## 2023-09-01 PROCEDURE — 85027 COMPLETE CBC AUTOMATED: CPT | Performed by: EMERGENCY MEDICINE

## 2023-09-01 PROCEDURE — 99285 EMERGENCY DEPT VISIT HI MDM: CPT | Performed by: EMERGENCY MEDICINE

## 2023-09-01 PROCEDURE — 96376 TX/PRO/DX INJ SAME DRUG ADON: CPT

## 2023-09-01 PROCEDURE — 99223 1ST HOSP IP/OBS HIGH 75: CPT | Performed by: PHYSICIAN ASSISTANT

## 2023-09-01 PROCEDURE — 86850 RBC ANTIBODY SCREEN: CPT | Performed by: EMERGENCY MEDICINE

## 2023-09-01 PROCEDURE — 96361 HYDRATE IV INFUSION ADD-ON: CPT

## 2023-09-01 PROCEDURE — 96365 THER/PROPH/DIAG IV INF INIT: CPT

## 2023-09-01 PROCEDURE — 86901 BLOOD TYPING SEROLOGIC RH(D): CPT | Performed by: EMERGENCY MEDICINE

## 2023-09-01 PROCEDURE — 84145 PROCALCITONIN (PCT): CPT | Performed by: PHYSICIAN ASSISTANT

## 2023-09-01 PROCEDURE — 80053 COMPREHEN METABOLIC PANEL: CPT | Performed by: EMERGENCY MEDICINE

## 2023-09-01 PROCEDURE — 96375 TX/PRO/DX INJ NEW DRUG ADDON: CPT

## 2023-09-01 PROCEDURE — 85007 BL SMEAR W/DIFF WBC COUNT: CPT | Performed by: EMERGENCY MEDICINE

## 2023-09-01 PROCEDURE — C9113 INJ PANTOPRAZOLE SODIUM, VIA: HCPCS | Performed by: EMERGENCY MEDICINE

## 2023-09-01 PROCEDURE — 36415 COLL VENOUS BLD VENIPUNCTURE: CPT

## 2023-09-01 PROCEDURE — 99285 EMERGENCY DEPT VISIT HI MDM: CPT

## 2023-09-01 PROCEDURE — 83690 ASSAY OF LIPASE: CPT | Performed by: EMERGENCY MEDICINE

## 2023-09-01 PROCEDURE — C9113 INJ PANTOPRAZOLE SODIUM, VIA: HCPCS | Performed by: PHYSICIAN ASSISTANT

## 2023-09-01 RX ORDER — POTASSIUM CHLORIDE 20MEQ/15ML
40 LIQUID (ML) ORAL ONCE
Status: COMPLETED | OUTPATIENT
Start: 2023-09-01 | End: 2023-09-01

## 2023-09-01 RX ORDER — CEFTRIAXONE 1 G/50ML
1000 INJECTION, SOLUTION INTRAVENOUS ONCE
Status: COMPLETED | OUTPATIENT
Start: 2023-09-01 | End: 2023-09-01

## 2023-09-01 RX ORDER — ACETAMINOPHEN 325 MG/1
650 TABLET ORAL EVERY 6 HOURS PRN
Status: DISCONTINUED | OUTPATIENT
Start: 2023-09-01 | End: 2023-09-03 | Stop reason: HOSPADM

## 2023-09-01 RX ORDER — PANTOPRAZOLE SODIUM 40 MG/10ML
40 INJECTION, POWDER, LYOPHILIZED, FOR SOLUTION INTRAVENOUS ONCE
Status: COMPLETED | OUTPATIENT
Start: 2023-09-01 | End: 2023-09-01

## 2023-09-01 RX ORDER — ONDANSETRON 2 MG/ML
4 INJECTION INTRAMUSCULAR; INTRAVENOUS EVERY 6 HOURS PRN
Status: DISCONTINUED | OUTPATIENT
Start: 2023-09-01 | End: 2023-09-03 | Stop reason: HOSPADM

## 2023-09-01 RX ORDER — ATORVASTATIN CALCIUM 20 MG/1
20 TABLET, FILM COATED ORAL
Status: DISCONTINUED | OUTPATIENT
Start: 2023-09-02 | End: 2023-09-03 | Stop reason: HOSPADM

## 2023-09-01 RX ORDER — MAGNESIUM HYDROXIDE/ALUMINUM HYDROXICE/SIMETHICONE 120; 1200; 1200 MG/30ML; MG/30ML; MG/30ML
30 SUSPENSION ORAL EVERY 6 HOURS PRN
Status: DISCONTINUED | OUTPATIENT
Start: 2023-09-01 | End: 2023-09-03 | Stop reason: HOSPADM

## 2023-09-01 RX ORDER — LISINOPRIL 10 MG/1
10 TABLET ORAL DAILY
Status: DISCONTINUED | OUTPATIENT
Start: 2023-09-02 | End: 2023-09-03 | Stop reason: HOSPADM

## 2023-09-01 RX ADMIN — SODIUM CHLORIDE 1000 ML: 0.9 INJECTION, SOLUTION INTRAVENOUS at 18:02

## 2023-09-01 RX ADMIN — PANTOPRAZOLE SODIUM 40 MG: 40 INJECTION, POWDER, FOR SOLUTION INTRAVENOUS at 18:20

## 2023-09-01 RX ADMIN — SODIUM CHLORIDE 1000 ML: 0.9 INJECTION, SOLUTION INTRAVENOUS at 22:25

## 2023-09-01 RX ADMIN — SODIUM CHLORIDE 8 MG/HR: 9 INJECTION, SOLUTION INTRAVENOUS at 21:56

## 2023-09-01 RX ADMIN — CEFTRIAXONE 1000 MG: 1 INJECTION, SOLUTION INTRAVENOUS at 20:12

## 2023-09-01 RX ADMIN — PANTOPRAZOLE SODIUM 40 MG: 40 INJECTION, POWDER, FOR SOLUTION INTRAVENOUS at 20:11

## 2023-09-01 RX ADMIN — POTASSIUM CHLORIDE 40 MEQ: 1.5 SOLUTION ORAL at 21:56

## 2023-09-02 ENCOUNTER — APPOINTMENT (INPATIENT)
Dept: GASTROENTEROLOGY | Facility: HOSPITAL | Age: 58
DRG: 378 | End: 2023-09-02
Payer: COMMERCIAL

## 2023-09-02 ENCOUNTER — ANESTHESIA (INPATIENT)
Dept: GASTROENTEROLOGY | Facility: HOSPITAL | Age: 58
DRG: 378 | End: 2023-09-02
Payer: COMMERCIAL

## 2023-09-02 ENCOUNTER — APPOINTMENT (INPATIENT)
Dept: CT IMAGING | Facility: HOSPITAL | Age: 58
DRG: 378 | End: 2023-09-02
Payer: COMMERCIAL

## 2023-09-02 ENCOUNTER — ANESTHESIA EVENT (INPATIENT)
Dept: GASTROENTEROLOGY | Facility: HOSPITAL | Age: 58
DRG: 378 | End: 2023-09-02
Payer: COMMERCIAL

## 2023-09-02 ENCOUNTER — ANESTHESIA EVENT (OUTPATIENT)
Dept: ANESTHESIOLOGY | Facility: HOSPITAL | Age: 58
End: 2023-09-02

## 2023-09-02 ENCOUNTER — ANESTHESIA (OUTPATIENT)
Dept: ANESTHESIOLOGY | Facility: HOSPITAL | Age: 58
End: 2023-09-02

## 2023-09-02 PROBLEM — D62 ACUTE BLOOD LOSS ANEMIA: Status: ACTIVE | Noted: 2023-09-01

## 2023-09-02 PROBLEM — K25.3 ACUTE PREPYLORIC ULCER: Status: ACTIVE | Noted: 2023-09-02

## 2023-09-02 LAB
ANION GAP SERPL CALCULATED.3IONS-SCNC: 8 MMOL/L
BACTERIA UR QL AUTO: ABNORMAL /HPF
BILIRUB UR QL STRIP: NEGATIVE
BUN SERPL-MCNC: 15 MG/DL (ref 5–25)
CALCIUM SERPL-MCNC: 8.3 MG/DL (ref 8.4–10.2)
CHLORIDE SERPL-SCNC: 108 MMOL/L (ref 96–108)
CLARITY UR: CLEAR
CO2 SERPL-SCNC: 21 MMOL/L (ref 21–32)
COLOR UR: COLORLESS
CREAT SERPL-MCNC: 0.68 MG/DL (ref 0.6–1.3)
ERYTHROCYTE [DISTWIDTH] IN BLOOD BY AUTOMATED COUNT: 13.2 % (ref 11.6–15.1)
ERYTHROCYTE [DISTWIDTH] IN BLOOD BY AUTOMATED COUNT: 13.2 % (ref 11.6–15.1)
GFR SERPL CREATININE-BSD FRML MDRD: 98 ML/MIN/1.73SQ M
GLUCOSE SERPL-MCNC: 100 MG/DL (ref 65–140)
GLUCOSE UR STRIP-MCNC: NEGATIVE MG/DL
HCT VFR BLD AUTO: 21.6 % (ref 34.8–46.1)
HCT VFR BLD AUTO: 22 % (ref 34.8–46.1)
HCT VFR BLD AUTO: 24.1 % (ref 34.8–46.1)
HGB BLD-MCNC: 7 G/DL (ref 11.5–15.4)
HGB BLD-MCNC: 7 G/DL (ref 11.5–15.4)
HGB BLD-MCNC: 7.9 G/DL (ref 11.5–15.4)
HGB UR QL STRIP.AUTO: ABNORMAL
KETONES UR STRIP-MCNC: ABNORMAL MG/DL
LACTATE SERPL-SCNC: 1 MMOL/L (ref 0.5–2)
LEUKOCYTE ESTERASE UR QL STRIP: NEGATIVE
MCH RBC QN AUTO: 30.2 PG (ref 26.8–34.3)
MCH RBC QN AUTO: 30.9 PG (ref 26.8–34.3)
MCHC RBC AUTO-ENTMCNC: 32.4 G/DL (ref 31.4–37.4)
MCHC RBC AUTO-ENTMCNC: 32.8 G/DL (ref 31.4–37.4)
MCV RBC AUTO: 93 FL (ref 82–98)
MCV RBC AUTO: 94 FL (ref 82–98)
NITRITE UR QL STRIP: NEGATIVE
NON-SQ EPI CELLS URNS QL MICRO: ABNORMAL /HPF
PH UR STRIP.AUTO: 6 [PH]
PLATELET # BLD AUTO: 233 THOUSANDS/UL (ref 149–390)
PLATELET # BLD AUTO: 270 THOUSANDS/UL (ref 149–390)
PMV BLD AUTO: 10.9 FL (ref 8.9–12.7)
PMV BLD AUTO: 11.5 FL (ref 8.9–12.7)
POTASSIUM SERPL-SCNC: 3.5 MMOL/L (ref 3.5–5.3)
PROT UR STRIP-MCNC: NEGATIVE MG/DL
RBC # BLD AUTO: 2.32 MILLION/UL (ref 3.81–5.12)
RBC # BLD AUTO: 2.56 MILLION/UL (ref 3.81–5.12)
RBC #/AREA URNS AUTO: ABNORMAL /HPF
SODIUM SERPL-SCNC: 137 MMOL/L (ref 135–147)
SP GR UR STRIP.AUTO: 1.01 (ref 1–1.03)
UROBILINOGEN UR STRIP-ACNC: <2 MG/DL
WBC # BLD AUTO: 10.81 THOUSAND/UL (ref 4.31–10.16)
WBC # BLD AUTO: 8.78 THOUSAND/UL (ref 4.31–10.16)
WBC #/AREA URNS AUTO: ABNORMAL /HPF

## 2023-09-02 PROCEDURE — 88342 IMHCHEM/IMCYTCHM 1ST ANTB: CPT | Performed by: PATHOLOGY

## 2023-09-02 PROCEDURE — 88341 IMHCHEM/IMCYTCHM EA ADD ANTB: CPT | Performed by: PATHOLOGY

## 2023-09-02 PROCEDURE — 83605 ASSAY OF LACTIC ACID: CPT | Performed by: PHYSICIAN ASSISTANT

## 2023-09-02 PROCEDURE — 99222 1ST HOSP IP/OBS MODERATE 55: CPT | Performed by: INTERNAL MEDICINE

## 2023-09-02 PROCEDURE — C9113 INJ PANTOPRAZOLE SODIUM, VIA: HCPCS | Performed by: PHYSICIAN ASSISTANT

## 2023-09-02 PROCEDURE — 99232 SBSQ HOSP IP/OBS MODERATE 35: CPT | Performed by: INTERNAL MEDICINE

## 2023-09-02 PROCEDURE — 81001 URINALYSIS AUTO W/SCOPE: CPT | Performed by: INTERNAL MEDICINE

## 2023-09-02 PROCEDURE — G1004 CDSM NDSC: HCPCS

## 2023-09-02 PROCEDURE — 80048 BASIC METABOLIC PNL TOTAL CA: CPT | Performed by: INTERNAL MEDICINE

## 2023-09-02 PROCEDURE — 43239 EGD BIOPSY SINGLE/MULTIPLE: CPT | Performed by: INTERNAL MEDICINE

## 2023-09-02 PROCEDURE — 85018 HEMOGLOBIN: CPT | Performed by: INTERNAL MEDICINE

## 2023-09-02 PROCEDURE — 85027 COMPLETE CBC AUTOMATED: CPT | Performed by: INTERNAL MEDICINE

## 2023-09-02 PROCEDURE — 85014 HEMATOCRIT: CPT | Performed by: INTERNAL MEDICINE

## 2023-09-02 PROCEDURE — 87040 BLOOD CULTURE FOR BACTERIA: CPT | Performed by: PHYSICIAN ASSISTANT

## 2023-09-02 PROCEDURE — 0DB78ZX EXCISION OF STOMACH, PYLORUS, VIA NATURAL OR ARTIFICIAL OPENING ENDOSCOPIC, DIAGNOSTIC: ICD-10-PCS | Performed by: INTERNAL MEDICINE

## 2023-09-02 PROCEDURE — 88305 TISSUE EXAM BY PATHOLOGIST: CPT | Performed by: PATHOLOGY

## 2023-09-02 PROCEDURE — 74177 CT ABD & PELVIS W/CONTRAST: CPT

## 2023-09-02 PROCEDURE — 85027 COMPLETE CBC AUTOMATED: CPT | Performed by: PHYSICIAN ASSISTANT

## 2023-09-02 RX ORDER — PROPOFOL 10 MG/ML
INJECTION, EMULSION INTRAVENOUS AS NEEDED
Status: DISCONTINUED | OUTPATIENT
Start: 2023-09-02 | End: 2023-09-02

## 2023-09-02 RX ORDER — LORAZEPAM 1 MG/1
1 TABLET ORAL ONCE
Status: DISCONTINUED | OUTPATIENT
Start: 2023-09-02 | End: 2023-09-03 | Stop reason: HOSPADM

## 2023-09-02 RX ORDER — FENTANYL CITRATE/PF 50 MCG/ML
25 SYRINGE (ML) INJECTION
Status: DISCONTINUED | OUTPATIENT
Start: 2023-09-02 | End: 2023-09-02 | Stop reason: HOSPADM

## 2023-09-02 RX ORDER — LIDOCAINE HYDROCHLORIDE 20 MG/ML
INJECTION, SOLUTION EPIDURAL; INFILTRATION; INTRACAUDAL; PERINEURAL AS NEEDED
Status: DISCONTINUED | OUTPATIENT
Start: 2023-09-02 | End: 2023-09-02

## 2023-09-02 RX ORDER — ONDANSETRON 2 MG/ML
4 INJECTION INTRAMUSCULAR; INTRAVENOUS ONCE AS NEEDED
Status: DISCONTINUED | OUTPATIENT
Start: 2023-09-02 | End: 2023-09-02 | Stop reason: HOSPADM

## 2023-09-02 RX ORDER — ALBUTEROL SULFATE 2.5 MG/3ML
2.5 SOLUTION RESPIRATORY (INHALATION) ONCE AS NEEDED
Status: DISCONTINUED | OUTPATIENT
Start: 2023-09-02 | End: 2023-09-02 | Stop reason: HOSPADM

## 2023-09-02 RX ORDER — PANTOPRAZOLE SODIUM 40 MG/1
40 TABLET, DELAYED RELEASE ORAL
Status: DISCONTINUED | OUTPATIENT
Start: 2023-09-02 | End: 2023-09-03 | Stop reason: HOSPADM

## 2023-09-02 RX ADMIN — PROPOFOL 30 MG: 10 INJECTION, EMULSION INTRAVENOUS at 11:22

## 2023-09-02 RX ADMIN — ATORVASTATIN CALCIUM 20 MG: 20 TABLET, FILM COATED ORAL at 17:22

## 2023-09-02 RX ADMIN — LISINOPRIL 10 MG: 10 TABLET ORAL at 08:30

## 2023-09-02 RX ADMIN — PANTOPRAZOLE SODIUM 40 MG: 40 TABLET, DELAYED RELEASE ORAL at 15:22

## 2023-09-02 RX ADMIN — PROPOFOL 80 MG: 10 INJECTION, EMULSION INTRAVENOUS at 11:21

## 2023-09-02 RX ADMIN — PROPOFOL 40 MG: 10 INJECTION, EMULSION INTRAVENOUS at 11:24

## 2023-09-02 RX ADMIN — ACETAMINOPHEN 650 MG: 325 TABLET, FILM COATED ORAL at 19:37

## 2023-09-02 RX ADMIN — LIDOCAINE HYDROCHLORIDE 100 MG: 20 INJECTION, SOLUTION EPIDURAL; INFILTRATION; INTRACAUDAL; PERINEURAL at 11:21

## 2023-09-02 RX ADMIN — IOHEXOL 100 ML: 350 INJECTION, SOLUTION INTRAVENOUS at 08:47

## 2023-09-02 RX ADMIN — SODIUM CHLORIDE 8 MG/HR: 9 INJECTION, SOLUTION INTRAVENOUS at 05:24

## 2023-09-02 NOTE — ANESTHESIA PREPROCEDURE EVALUATION
Procedure:  PRE-OP ONLY    Episodes of coffee ground emesis and melena Hbg drop 12.8 in 2022 to 9.4 now 7.0 now 7.9    Relevant Problems   CARDIO   (+) HTN (hypertension)   (+) Mixed hyperlipidemia      HEMATOLOGY   (+) Anemia             Anesthesia Plan  ASA Score- 2     Anesthesia Type- IV sedation with anesthesia with ASA Monitors. Additional Monitors:   Airway Plan:           Plan Factors-    Chart reviewed. EKG reviewed. Existing labs reviewed. Patient summary reviewed.             Induction-     Postoperative Plan-     Informed Consent-

## 2023-09-02 NOTE — H&P
[address]  H&P  Name: Boris Villalobos 66 y.o. female I MRN: [id number]  Unit/Bed#: -01 I Date of Admission: 9/1/2023   Date of Service: 9/1/2023 I Hospital Day: 0      Assessment/Plan   * Melena  Assessment & Plan  · 2 episodes today and 2 episodes of hematemesis on Wednesday with associated tachycardia  · Per ED, discussed with on-call GI who recommended starting Protonix drip which has been ordered  · GI consult placed  · Monitor H/H  · Hold VTE prophylaxis and home aspirin    Hypokalemia  Assessment & Plan  · Potassium slightly decreased at 3.3  · Give a dose of p.o. potassium chloride 40 mEq now    SIRS (systemic inflammatory response syndrome) ([address])  Assessment & Plan  · Meeting criteria due to tachycardia and leukocytosis which are most likely reactionary from vomiting and diarrhea  · No clear infectious source at this time, so we will hold off on further antibiotic doses at this time as patient did receive a one-time dose of IV ceftriaxone in the ED tonight  · We will check procalcitonin, lactic acid, blood culture x2  · Given IV fluid bolus in the ED    Anemia  Assessment & Plan  · Rule out upper GI bleed  · Hemoglobin currently 9.4, was last 12.8 in October 2022  · Continue to monitor H/H every 12 hours, consider blood transfusion for hemoglobin less than 7  · Been screen obtained in ED         VTE Pharmacologic Prophylaxis: VTE Score: 1 Low Risk (Score 0-2) - Encourage Ambulation. Code Status: Level 1 - Full Code   Discussion with family: pt. Anticipated Length of Stay: Patient will be admitted on an inpatient basis with an anticipated length of stay of greater than 2 midnights secondary to see above.     Total Time Spent on Date of Encounter in care of patient: 75 minutes This time was spent on one or more of the following: performing physical exam; counseling and coordination of care; obtaining or reviewing history; documenting in the medical record; reviewing/ordering tests, medications or procedures; communicating with other healthcare professionals and discussing with patient's family/caregivers. Chief Complaint:    Chief Complaint   Patient presents with   • Black or Bloody Stool     Pt reports shes been having loose black stool since Tuesday, as well as vomiting black. History of Present Illness:  Claudine Newton is a 66 y.o. female with a PMH of hypertension, hyperlipidemia who presents with complaint of sudden onset 2 episodes of black emesis on Wednesday and 2 episodes of black tarry stool today. Patient reports she started not feeling well with the mild upset stomach on Tuesday and thought she might have food poisoning. She reports she ate chicken broth and then on Wednesday it felt her worse with 2 episodes of vomiting where the emesis was very black. She then reports on Thursday she felt better and had 2 scrambled eggs and did take 1 dose of Pepto-Bismol and then reports today she had 2 episodes of melena and during one bout of melena she felt presyncopal like she was going to pass out, but she reports she did not pass out. .  Denies any abdominal pain over the last few days, chest pain. He may have had a fever yesterday. Review of Systems:  Review of Systems   Constitutional: Positive for fever. Respiratory: Negative for shortness of breath. Cardiovascular: Negative for chest pain. Gastrointestinal: Positive for diarrhea, nausea and vomiting. Negative for abdominal pain. Neurological: Negative for dizziness and syncope. Past Medical and Surgical History:   Past Medical History:   Diagnosis Date   • Abnormal Pap smear of cervix    • Hearing loss    • Hyperlipemia    • Hypertension    • Urinary tract infection        Past Surgical History:   Procedure Laterality Date   • APPENDECTOMY     • KNEE SURGERY         Meds/Allergies:  Prior to Admission medications    Medication Sig Start Date End Date Taking?  Authorizing Provider   aspirin 81 mg chewable tablet Chew 81 mg daily    Historical Provider, MD   estrogens, conjugated (Premarin) vaginal cream Insert 0.5 g into the vagina 2 (two) times a week 23   HAYLEY Capellan   lisinopril (ZESTRIL) 10 mg tablet TAKE ONE TABLET BY MOUTH EVERY DAY 23   Amy Westbrook DO   rosuvastatin (CRESTOR) 10 MG tablet Take 1 tablet (10 mg total) by mouth daily 23   Amy Westbrook DO     I reviewed her medications per review of chart. Allergies:    Allergies   Allergen Reactions   • Penicillins Rash       Social History:  Marital Status: /Civil Union     Patient Pre-hospital Living Situation: Home  Patient Pre-hospital Level of Mobility: walks  Patient Pre-hospital Diet Restrictions: n/a  Substance Use History:   Social History     Substance and Sexual Activity   Alcohol Use Yes   • Alcohol/week: 2.0 standard drinks of alcohol   • Types: 2 Glasses of wine per week     Social History     Tobacco Use   Smoking Status Former   • Packs/day: 0.00   • Types: Cigarettes   • Start date: 1980   • Quit date: 10/12/1990   • Years since quittin.9   Smokeless Tobacco Never   Tobacco Comments    never a heavy user; social smoker     Social History     Substance and Sexual Activity   Drug Use No       Family History:  Family History   Problem Relation Age of Onset   • Heart disease Mother    • Diabetes Mother    • Kidney disease Mother    • Angina Mother    • Coronary artery disease Mother    • Hyperlipidemia Mother    • Dialysis Mother    • Stroke Mother    • Heart disease Father    • Diabetes Father    • Prostate cancer Father 76   • Coronary artery disease Father    • Cancer Father         bladder cancer   • Hyperlipidemia Father    • No Known Problems Sister    • No Known Problems Maternal Grandmother    • Heart disease Paternal Grandmother    • Coronary artery disease Brother    • Diabetes Brother    • Heart disease Brother    • Hyperlipidemia Brother    • No Known Problems Paternal Aunt    • No Known Problems Paternal Aunt    • No Known Problems Paternal Aunt    • Breast cancer Neg Hx    • Colon cancer Neg Hx    • Ovarian cancer Neg Hx    • Cervical cancer Neg Hx    • Uterine cancer Neg Hx        Physical Exam:     Vitals:   Blood Pressure: 147/93 (09/01/23 2130)  Pulse: 91 (09/01/23 2130)  Temperature: 99.6 °F (37.6 °C) (09/01/23 2130)  Temp Source: Temporal (09/01/23 1632)  Respirations: 19 (09/01/23 2130)  SpO2: 98 % (09/01/23 2130)    Physical Exam  Vitals and nursing note reviewed. Constitutional:       General: She is not in acute distress. Appearance: Normal appearance. She is not diaphoretic. HENT:      Head: Normocephalic. Cardiovascular:      Rate and Rhythm: Regular rhythm. Tachycardia present. Pulmonary:      Effort: Pulmonary effort is normal. No respiratory distress. Breath sounds: Normal breath sounds. Abdominal:      General: Abdomen is flat. Bowel sounds are normal. There is no distension. Palpations: Abdomen is soft. Tenderness: There is no abdominal tenderness. There is no guarding. Musculoskeletal:      Right lower leg: No edema. Left lower leg: No edema. Skin:     General: Skin is warm and dry. Coloration: Skin is not pale. Neurological:      General: No focal deficit present. Mental Status: She is alert and oriented to person, place, and time. Psychiatric:         Mood and Affect: Mood normal.         Behavior: Behavior normal.         Thought Content:  Thought content normal.          Additional Data:     Lab Results:  Results from last 7 days   Lab Units 09/01/23  1640   WBC Thousand/uL 16.09*   HEMOGLOBIN g/dL 9.4*   HEMATOCRIT % 28.9*   PLATELETS Thousands/uL 368   LYMPHO PCT % 22   MONO PCT % 4   EOS PCT % 1     Results from last 7 days   Lab Units 09/01/23  1640   SODIUM mmol/L 138   POTASSIUM mmol/L 3.3*   CHLORIDE mmol/L 102   CO2 mmol/L 21   BUN mg/dL 32*   CREATININE mg/dL 0.82   ANION GAP mmol/L 15   CALCIUM mg/dL 9.6   ALBUMIN g/dL 4.7   TOTAL BILIRUBIN mg/dL 0.46   ALK PHOS U/L 55   ALT U/L 12   AST U/L 12*   GLUCOSE RANDOM mg/dL 140                       Lines/Drains:  Invasive Devices     Peripheral Intravenous Line  Duration           Peripheral IV 09/01/23 Right Antecubital <1 day                    Imaging: No pertinent imaging reviewed. No orders to display       EKG and Other Studies Reviewed on Admission:   · EKG: No EKG obtained. ** Please Note: This note has been constructed using a voice recognition system.  **

## 2023-09-02 NOTE — ANESTHESIA PREPROCEDURE EVALUATION
Procedure:  EGD  Episodes of coffee ground emesis and melena Hbg drop 12.8 in 2022 to 9.4 now 7.0 now 7.9    Denies the following: CP/SOB with exertion, asthma, COPD, CAMILO, stroke/TIA, seizure      Relevant Problems   CARDIO   (+) HTN (hypertension)   (+) Mixed hyperlipidemia      HEMATOLOGY   (+) Anemia        Physical Exam    Airway    Mallampati score: II  TM Distance: >3 FB  Neck ROM: full     Dental   No notable dental hx     Cardiovascular      Pulmonary      Other Findings        Anesthesia Plan  ASA Score- 2     Anesthesia Type- IV sedation with anesthesia with ASA Monitors. Additional Monitors:   Airway Plan:           Plan Factors-Exercise tolerance (METS): >4 METS. Chart reviewed. EKG reviewed. Existing labs reviewed. Patient summary reviewed. Patient is not a current smoker. Obstructive sleep apnea risk education given perioperatively. Induction-     Postoperative Plan-     Informed Consent- Anesthetic plan and risks discussed with patient. I personally reviewed this patient with the CRNA. Discussed and agreed on the Anesthesia Plan with the CRNA. Balbina Schrader

## 2023-09-02 NOTE — ASSESSMENT & PLAN NOTE
· Potassium slightly decreased at 3.3 on admission, repleted. Today 3.5.    · Repeat BMP in the am. Replete as needed

## 2023-09-02 NOTE — PROGRESS NOTES
[address]  Progress Note  Name: Taras Gross  MRN: [id number]  Unit/Bed#: -01 I Date of Admission: 9/1/2023   Date of Service: 9/2/2023 I Hospital Day: 1    Assessment/Plan   Acute prepyloric ulcer  Assessment & Plan  Pt underwent EGD showing :   FINDINGS:  • The esophagus appeared normal.  • Cratered, round, benign-appearing ulcer in the prepyloric region with clean base (Sen III); performed cold forceps biopsy. An erythematous fold was present adjacent to the ulcer. • The duodenum appeared normal.  • Clear liquid diet for today, advance diet in am if all well. • Transition to BID dosing for protonix  • Pt will f/u with GI outpt for biopsy results  • Hopeful for DC in am.         Acute blood loss anemia  Assessment & Plan  Pt found on EGD to have a non bleeding pre-pyloric ulcer. Hemoglobin on the rise at 7.9. Will reassess q 8 x 2 then in the am.   · Hemoglobin currently 9.4, was last 12.8 in October 2022  · Continue to monitor H/H every 12 hours, consider blood transfusion for hemoglobin less than 7  · Continue Protonix now as BID dosing. Hypokalemia  Assessment & Plan  · Potassium slightly decreased at 3.3 on admission, repleted. Today 3.5. · Repeat BMP in the am. Replete as needed    SIRS (systemic inflammatory response syndrome) ([address])  Assessment & Plan  Present on admission as evidenced by tachycardia and leukocytosis. White count had normalized over the last 24 hours. She received one dose of ceftriaxone in the ED. No UA was sent . Possible demargination from vomiting and diarrhea. Will send UA for thoroughness.     * Melena  Assessment & Plan  · 2 episodes on the day of admission and 2 episodes of hematemesis on Wednesday with associated tachycardia  · EGD shows prepyloric ulcer, non bleeding  · Monitor H/H, transfuse if drops below 7  · Hold VTE prophylaxis and home aspirin    Mixed hyperlipidemia  Assessment & Plan  Continue statin      Primary hypertension  Assessment & Plan  BP stable tolerating ACE. VTE Pharmacologic Prophylaxis:   Pharmacologic: Pharmacologic VTE Prophylaxis contraindicated due to ABLA , ulcer  Mechanical VTE Prophylaxis in Place: Yes    Patient Centered Rounds: I have performed room side rounds with nursing staff today. Discussions with Specialists or Other Care Team Provider: d/w GI post procedure    Education and Discussions with Family / Patient: Spouse has left, Pt states questions answered. Time Spent for Care: 30 minutes. Current Length of Stay: 1 day(s)    Current Patient Status: Inpatient   Certification Statement: The patient will continue to require additional inpatient hospital stay due to need to transition to solid food and monitor for further bleeding. Discharge Plan: Hopeful for dc in am if tolerates advancement of diet. Code Status: Level 1 - Full Code      Subjective:   Pt reports feeling well post procedure, denies pain, nause, or diarrhea. Tolerating clear liquids at present. Answered question regarding f/u biopsy, h pylori, and advancement of diet. Objective:     Vitals:   Temp (24hrs), Av.7 °F (37.1 °C), Min:97.4 °F (36.3 °C), Max:99.6 °F (37.6 °C)    Temp:  [97.4 °F (36.3 °C)-99.6 °F (37.6 °C)] 98.4 °F (36.9 °C)  HR:  [] 93  Resp:  [14-21] 19  BP: (107-147)/(59-93) 128/78  SpO2:  [92 %-100 %] 99 %  Body mass index is 24.37 kg/m². Input and Output Summary (last 24 hours): Intake/Output Summary (Last 24 hours) at 2023 1419  Last data filed at 2023  Gross per 24 hour   Intake 1000 ml   Output --   Net 1000 ml       Physical Exam:     Physical Exam  Vitals and nursing note reviewed. Constitutional:       General: She is not in acute distress. Appearance: She is well-developed. HENT:      Head: Normocephalic and atraumatic. Eyes:      Conjunctiva/sclera: Conjunctivae normal.   Cardiovascular:      Rate and Rhythm: Normal rate and regular rhythm. Heart sounds: No murmur heard. Pulmonary:      Effort: Pulmonary effort is normal. No respiratory distress. Breath sounds: Normal breath sounds. Abdominal:      Palpations: Abdomen is soft. Tenderness: There is no abdominal tenderness. There is no guarding or rebound. Musculoskeletal:         General: No swelling. Cervical back: Neck supple. Skin:     General: Skin is warm and dry. Capillary Refill: Capillary refill takes less than 2 seconds. Neurological:      Mental Status: She is alert. Psychiatric:         Mood and Affect: Mood normal.           Additional Data:     Labs:    Results from last 7 days   Lab Units 09/02/23  0910 09/02/23  0646 09/01/23  1640   WBC Thousand/uL 10.81*   < > 16.09*   HEMOGLOBIN g/dL 7.9*   < > 9.4*   HEMATOCRIT % 24.1*   < > 28.9*   PLATELETS Thousands/uL 270   < > 368   LYMPHO PCT %  --   --  22   MONO PCT %  --   --  4   EOS PCT %  --   --  1    < > = values in this interval not displayed. Results from last 7 days   Lab Units 09/02/23  0910 09/01/23  1640   SODIUM mmol/L 137 138   POTASSIUM mmol/L 3.5 3.3*   CHLORIDE mmol/L 108 102   CO2 mmol/L 21 21   BUN mg/dL 15 32*   CREATININE mg/dL 0.68 0.82   ANION GAP mmol/L 8 15   CALCIUM mg/dL 8.3* 9.6   ALBUMIN g/dL  --  4.7   TOTAL BILIRUBIN mg/dL  --  0.46   ALK PHOS U/L  --  55   ALT U/L  --  12   AST U/L  --  12*   GLUCOSE RANDOM mg/dL 100 140                 Results from last 7 days   Lab Units 09/02/23  0613 09/01/23  1640   LACTIC ACID mmol/L 1.0  --    PROCALCITONIN ng/ml  --  0.07           * I Have Reviewed All Lab Data Listed Above. * Additional Pertinent Lab Tests Reviewed: [address] Admission Reviewed    Imaging:    Imaging Reports Reviewed Today Include: None  Imaging Personally Reviewed by Myself Includes:  None    Recent Cultures (last 7 days):     Results from last 7 days   Lab Units 09/02/23  0548   BLOOD CULTURE  Received in Microbiology Lab.  Culture in Progress. Received in Microbiology Lab. Culture in Progress. Last 24 Hours Medication List:   Current Facility-Administered Medications   Medication Dose Route Frequency Provider Last Rate   • acetaminophen  650 mg Oral Q6H PRN Demetria Rojas PA-C     • aluminum-magnesium hydroxide-simethicone  30 mL Oral Q6H PRN Demetria Rojas PA-C     • atorvastatin  20 mg Oral Daily With Dinner Demetria Rojas PA-C     • lisinopril  10 mg Oral Daily Demetria Rojas PA-C     • ondansetron  4 mg Intravenous Q6H PRN Demetria Rojas PA-C     • pantoprazole  40 mg Oral BID AC Alexa Barnard MD          Today, Patient Was Seen By: Alexa Barnard MD    ** Please Note: Dictation voice to text software may have been used in the creation of this document.  **

## 2023-09-02 NOTE — ASSESSMENT & PLAN NOTE
· 2 episodes on the day of admission and 2 episodes of hematemesis on Wednesday with associated tachycardia  · EGD shows prepyloric ulcer, non bleeding  · Monitor H/H, transfuse if drops below 7  · Hold VTE prophylaxis and home aspirin

## 2023-09-02 NOTE — ANESTHESIA POSTPROCEDURE EVALUATION
Post-Op Assessment Note    CV Status:  Stable  Pain Score: 0    Pain management: adequate     Mental Status:  Alert and awake   Hydration Status:  Euvolemic   PONV Controlled:  Controlled   Airway Patency:  Patent      Post Op Vitals Reviewed: Yes      Staff: Anesthesiologist, with CRNAs         No notable events documented.

## 2023-09-02 NOTE — UTILIZATION REVIEW
Initial Clinical Review    Admission: Date/Time/Statement:   Admission Orders (From admission, onward)     Ordered        09/01/23 2033  INPATIENT ADMISSION  Once                      Orders Placed This Encounter   Procedures   • INPATIENT ADMISSION     Standing Status:   Standing     Number of Occurrences:   1     Order Specific Question:   Level of Care     Answer:   Med Surg [16]     Order Specific Question:   Estimated length of stay     Answer:   More than 2 Midnights     Order Specific Question:   Certification     Answer:   I certify that inpatient services are medically necessary for this patient for a duration of greater than two midnights. See H&P and MD Progress Notes for additional information about the patient's course of treatment. ED Arrival Information     Expected   -    Arrival   9/1/2023 16:29    Acuity   Emergent            Means of arrival   Walk-In    Escorted by   Self    Service   Hospitalist    Admission type   Emergency            Arrival complaint   Vomiting & Black Stool           Chief Complaint   Patient presents with   • Black or Bloody Stool     Pt reports shes been having loose black stool since Tuesday, as well as vomiting black. Initial Presentation: 66 y.o. female presents to ED from home with a sudden onset of 2 episodes of black emesis 2 days  Prior to admission and 2 episodes the day of admission. Started not feeling well earlier in the week, thought she might have food poisoning. Felt presyncopal the day of admission, did not pass out. PMH is  HTN. Met SIRS   Criteria in ED with tachycardia and leukocytosis, likely  From vomiting and diarrhea. Labs reveal potassium  3.3, hemoglobin 9.4. Admit  Ip with Melena, Hypokalemia, SIRS and anemia and plan is protonix drip, monitor labs, GI consult, S/P  IVF  Bolus in  ED, blood cultures  And replace electrolytes. Date: 9/2       Day 2:   GI consult  Plan  EGD  For cause of  UGIB. Remains  NPO with protonix  Drip. Hemoglobin dropped to  7 this am.      EGD shows  Non bleeding  Pre pyloric  Ulcer. Hemoglobin on the rise, now  7.9. Protonix drip d/c and  Changed to BID. Starting cl liq diet. Monitor  H/H  Q  12 hrs. ED Triage Vitals   Temperature Pulse Respirations Blood Pressure SpO2   09/01/23 1632 09/01/23 1632 09/01/23 1632 09/01/23 1632 09/01/23 1632   (!) 97.4 °F (36.3 °C) (!) 138 18 144/79 92 %      Temp Source Heart Rate Source Patient Position - Orthostatic VS BP Location FiO2 (%)   09/01/23 1632 09/01/23 1632 09/01/23 1632 09/01/23 1632 --   Temporal Monitor Sitting Left arm       Pain Score       09/01/23 2135       No Pain          Wt Readings from Last 1 Encounters:   09/01/23 68.5 kg (151 lb)     Additional Vital Signs:    98.4 °F (36.9 °C) 98 -- 115/78 90 98 % -- --   09/02/23 12:18:24 98.4 °F (36.9 °C) 93 -- 128/78 95 99 % -- --   09/02/23 1145 99.5 °F (37.5 °C) 92 19 117/66 -- 99 % None (Room air) --   09/02/23 1130 97.9 °F (36.6 °C) 94 18 109/59 -- 99 % None (Room air) --   09/02/23 1021 99.5 °F (37.5 °C) 91 20 142/69 -- 100 % None (Room air) --   09/02/23 07:35:47 98.5 °F (36.9 °C) 94 -- 118/80 93 100 % None (Room air) Lying   09/02/23 00:26:03 99.1 °F (37.3 °C) 96 -- -- -- 100 % -- --   09/01/23 21:30:25 99.6 °F (37.6 °C) 91 19 147/93 111 98 % -- --   09/01/23 2030 -- 103 20 118/64 85 98 % None (Room air) Lying   09/01/23 1930 -- 107 Abnormal  21 119/61 85 100 % None (Room air) Lying   09/01/23 1854 -- -- -- -- -- -- None (Room air) --   09/01/23 1815 -- 102 14 107/65 81 96 % None (Room air) Lying   09/01/23 1632 97.4 °F (36.3 °C) Abnormal  138 Abnormal  18 144/79 98 92 % None (Room air) Sitting       Pertinent Labs/Diagnostic Test Results:   CT abdomen pelvis w contrast   Final Result by Jami De La Rosa MD (09/02 9502)      1. Gastric antral thickening, which may represent gastritis versus gastric antral mass. Endoscopy is recommended for further evaluation.       2. Several punctate foci of hyperdensity in the ascending colon, which may be related to bismuth subsalicylate ingestion. However, given melena tiny foci of blood products cannot be excluded. Correlate with ingestion. Workstation performed: [id number]               Results from last 7 days   Lab Units 09/02/23  0910 09/02/23  0646 09/01/23  1640   WBC Thousand/uL 10.81* 8.78 16.09*   HEMOGLOBIN g/dL 7.9* 7.0* 9.4*   HEMATOCRIT % 24.1* 21.6* 28.9*   PLATELETS Thousands/uL 270 233 368         Results from last 7 days   Lab Units 09/02/23  0910 09/01/23  1640   SODIUM mmol/L 137 138   POTASSIUM mmol/L 3.5 3.3*   CHLORIDE mmol/L 108 102   CO2 mmol/L 21 21   ANION GAP mmol/L 8 15   BUN mg/dL 15 32*   CREATININE mg/dL 0.68 0.82   EGFR ml/min/1.73sq m 98 80   CALCIUM mg/dL 8.3* 9.6     Results from last 7 days   Lab Units 09/01/23  1640   AST U/L 12*   ALT U/L 12   ALK PHOS U/L 55   TOTAL PROTEIN g/dL 7.4   ALBUMIN g/dL 4.7   TOTAL BILIRUBIN mg/dL 0.46         Results from last 7 days   Lab Units 09/02/23  0910 09/01/23  1640   GLUCOSE RANDOM mg/dL 100 140               Results from last 7 days   Lab Units 09/01/23  1640   PROCALCITONIN ng/ml 0.07     Results from last 7 days   Lab Units 09/02/23  0613   LACTIC ACID mmol/L 1.0               Results from last 7 days   Lab Units 09/01/23  1640   LIPASE u/L 16               Results from last 7 days   Lab Units 09/02/23  0548   BLOOD CULTURE  Received in Microbiology Lab. Culture in Progress. Received in Microbiology Lab. Culture in Progress.                    ED Treatment:   Medication Administration from 09/01/2023 1629 to 09/01/2023 2119       Date/Time Order Dose Route Action Comments     09/01/2023 2002 EDT sodium chloride 0.9 % bolus 1,000 mL 0 mL Intravenous Stopped --     09/01/2023 1802 EDT sodium chloride 0.9 % bolus 1,000 mL 1,000 mL Intravenous New Bag --     09/01/2023 1820 EDT pantoprazole (PROTONIX) injection 40 mg 40 mg Intravenous Given --     09/01/2023 2011 EDT pantoprazole (PROTONIX) injection 40 mg 40 mg Intravenous Given --     09/01/2023 2012 EDT cefTRIAXone (ROCEPHIN) IVPB (premix in dextrose) 1,000 mg 50 mL 1,000 mg Intravenous New Bag --        Present on Admission:  • Melena  • Primary hypertension  • Mixed hyperlipidemia      Admitting Diagnosis: Vomiting [R11.10]  GI bleed [K92.2]  Black stool [K92.1]  Age/Sex: 66 y.o. female  Admission Orders:  Scheduled Medications:  atorvastatin, 20 mg, Oral, Daily With Dinner  lisinopril, 10 mg, Oral, Daily  pantoprazole, 40 mg, Oral, BID AC      Continuous IV Infusions:  Protonix  Drip - d/c   9/2     PRN Meds:  acetaminophen, 650 mg, Oral, Q6H PRN  aluminum-magnesium hydroxide-simethicone, 30 mL, Oral, Q6H PRN  ondansetron, 4 mg, Intravenous, Q6H PRN        IP CONSULT TO GASTROENTEROLOGY    Network Utilization Review Department  ATTENTION: Please call with any questions or concerns to [phone] and carefully listen to the prompts so that you are directed to the right person. All voicemails are confidential.  Emilee Meyer all requests for admission clinical reviews, approved or denied determinations and any other requests to dedicated fax number below belonging to the campus where the patient is receiving treatment.  List of dedicated fax numbers for the Facilities:  [place] DENIALS (Administrative/Medical Necessity) [phone] [address] [facility] (Maternity/NICU/Pediatrics) [phone]   [address] [place] [address] Drive [phone]   [facility] [phone]   [address] [place], [place] [address]e N [phone]   [address] [facility] [phone]

## 2023-09-02 NOTE — ASSESSMENT & PLAN NOTE
Pt found on EGD to have a non bleeding pre-pyloric ulcer. Hemoglobin on the rise at 7.9. Will reassess q 8 x 2 then in the am.   · Hemoglobin currently 9.4, was last 12.8 in October 2022  · Continue to monitor H/H every 12 hours, consider blood transfusion for hemoglobin less than 7  · Continue Protonix now as BID dosing.

## 2023-09-02 NOTE — ASSESSMENT & PLAN NOTE
· 2 episodes today and 2 episodes of hematemesis on Wednesday with associated tachycardia  · Per ED, discussed with on-call GI who recommended starting Protonix drip which has been ordered  · GI consult placed  · Monitor H/H  · Hold VTE prophylaxis and home aspirin

## 2023-09-02 NOTE — CONSULTS
Consultation - [address] [place] [address] [facility]  Elizabeth Montemayor 66 y.o. female MRN: [id number]  Unit/Bed#: -01 Encounter: [id number]      Inpatient consult to gastroenterology  Consult performed by: Abner Oliva PA-C  Consult ordered by: Ching Durham PA-C          Reason for Consult / Principal Problem: Coffee-ground emesis/melena    HPI: Elizabeth Montemayor is a 66y.o. year old female who presents with a past medical history significant for hypertension and hyperlipidemia. Patient presents to [facility] emergency department yesterday with a chief complaint of coffee-ground emesis as well as melena. Patient reports that on Tuesday of this past week she started to become ill feeling. She reports that she thought she was just coming down with a gastrointestinal bug. She reports that she had several episodes of coffee-ground emesis as well as several episodes of black tarry stool. Patient reports that she has had no further episodes of vomiting or diarrhea since admission. Patient does report that since January of this year she has had an increase in acid reflux-like symptoms. She reports that she has been utilizing over-the-counter antacids. Patient reports that she has never had an endoscopy in the past.  Patient denies any history of peptic ulcer disease. Patient does report that she does take 1 ibuprofen almost on a daily basis. Patient does report that she does drink champagne daily. Patient is up-to-date with routine screening colonoscopy. Patient's hemoglobin level in October 2022 was 12.8. Patient's hemoglobin level yesterday on admission was 9.4. Patient's hemoglobin level this morning is 7.0. REVIEW OF SYSTEMS:     CONSTITUTIONAL: Denies any fever, chills, or rigors. Good appetite, and no recent weight loss. HEENT: No earache or tinnitus. Denies hearing loss or visual disturbances. CARDIOVASCULAR: No chest pain or palpitations.    RESPIRATORY: Denies any cough, hemoptysis, shortness of breath or dyspnea on exertion. GASTROINTESTINAL: As noted in the History of Present Illness. GENITOURINARY: No problems with urination. Denies any hematuria or dysuria. NEUROLOGIC: No dizziness or vertigo, denies headaches. MUSCULOSKELETAL: Denies any muscle or joint pain. SKIN: Denies skin rashes or itching. ENDOCRINE: Denies excessive thirst. Denies intolerance to heat or cold. PSYCHOSOCIAL: Denies depression or anxiety. Denies any recent memory loss.      Historical Information   Past Medical History:   Diagnosis Date   • Abnormal Pap smear of cervix    • Hearing loss    • Hyperlipemia    • Hypertension    • Urinary tract infection      Past Surgical History:   Procedure Laterality Date   • APPENDECTOMY     • KNEE SURGERY       Social History   Social History     Substance and Sexual Activity   Alcohol Use Yes   • Alcohol/week: 2.0 standard drinks of alcohol   • Types: 2 Glasses of wine per week     Social History     Substance and Sexual Activity   Drug Use No     Social History     Tobacco Use   Smoking Status Former   • Packs/day: 0.00   • Types: Cigarettes   • Start date: 1980   • Quit date: 10/12/1990   • Years since quittin.9   Smokeless Tobacco Never   Tobacco Comments    never a heavy user; social smoker     Family History   Problem Relation Age of Onset   • Heart disease Mother    • Diabetes Mother    • Kidney disease Mother    • Angina Mother    • Coronary artery disease Mother    • Hyperlipidemia Mother    • Dialysis Mother    • Stroke Mother    • Heart disease Father    • Diabetes Father    • Prostate cancer Father 76   • Coronary artery disease Father    • Cancer Father         bladder cancer   • Hyperlipidemia Father    • No Known Problems Sister    • No Known Problems Maternal Grandmother    • Heart disease Paternal Grandmother    • Coronary artery disease Brother    • Diabetes Brother    • Heart disease Brother    • Hyperlipidemia Brother    • No Known Problems Paternal Aunt    • No Known Problems Paternal Aunt    • No Known Problems Paternal Aunt    • Breast cancer Neg Hx    • Colon cancer Neg Hx    • Ovarian cancer Neg Hx    • Cervical cancer Neg Hx    • Uterine cancer Neg Hx        Meds/Allergies     Medications Prior to Admission   Medication   • aspirin 81 mg chewable tablet   • estrogens, conjugated (Premarin) vaginal cream   • lisinopril (ZESTRIL) 10 mg tablet   • rosuvastatin (CRESTOR) 10 MG tablet     Current Facility-Administered Medications   Medication Dose Route Frequency   • acetaminophen (TYLENOL) tablet 650 mg  650 mg Oral Q6H PRN   • aluminum-magnesium hydroxide-simethicone (MAALOX) oral suspension 30 mL  30 mL Oral Q6H PRN   • atorvastatin (LIPITOR) tablet 20 mg  20 mg Oral Daily With Dinner   • lisinopril (ZESTRIL) tablet 10 mg  10 mg Oral Daily   • ondansetron (ZOFRAN) injection 4 mg  4 mg Intravenous Q6H PRN   • pantoprazole (PROTONIX) 80 mg in sodium chloride 0.9 % 100 mL infusion  8 mg/hr Intravenous Continuous       Allergies   Allergen Reactions   • Penicillins Rash       Objective   Blood pressure 118/80, pulse 94, temperature 98.5 °F (36.9 °C), temperature source Oral, resp. rate 19, height 5' 6" (1.676 m), weight 68.5 kg (151 lb), SpO2 100 %. Intake/Output Summary (Last 24 hours) at 9/2/2023 0912  Last data filed at 9/1/2023 2002  Gross per 24 hour   Intake 1000 ml   Output --   Net 1000 ml         PHYSICAL EXAM:      General Appearance:   Alert, cooperative, no distress, appears stated age    HEENT:   Normocephalic, atraumatic, anicteric.     Neck:  Supple, symmetrical, trachea midline, no adenopathy;    thyroid: no enlargement/tenderness/nodules; no carotid  bruit or JVD    Lungs:   Clear to auscultation bilaterally; no rales, rhonchi or wheezing; respirations unlabored    Heart[de-identified]   S1 and S2 normal; regular rate and rhythm; no murmur, rub, or gallop.    Abdomen:   Soft, non-tender, non-distended; normal bowel sounds; no masses, no organomegaly    Genitalia:   Deferred    Rectal:   Deferred    Extremities:  No cyanosis, clubbing or edema    Pulses:  2+ and symmetric all extremities    Skin:  Skin color, texture, turgor normal, no rashes or lesions    Lymph nodes:  No palpable cervical, axillary or inguinal lymphadenopathy        Lab Results:   Admission on 09/01/2023   Component Date Value   • WBC 09/01/2023 16.09 (H)    • RBC 09/01/2023 3.16 (L)    • Hemoglobin 09/01/2023 9.4 (L)    • Hematocrit 09/01/2023 28.9 (L)    • MCV 09/01/2023 92    • MCH 09/01/2023 29.7    • MCHC 09/01/2023 32.5    • RDW 09/01/2023 12.9    • MPV 09/01/2023 11.5    • Platelets 86/62/7433 368    • Sodium 09/01/2023 138    • Potassium 09/01/2023 3.3 (L)    • Chloride 09/01/2023 102    • CO2 09/01/2023 21    • ANION GAP 09/01/2023 15    • BUN 09/01/2023 32 (H)    • Creatinine 09/01/2023 0.82    • Glucose 09/01/2023 140    • Calcium 09/01/2023 9.6    • AST 09/01/2023 12 (L)    • ALT 09/01/2023 12    • Alkaline Phosphatase 09/01/2023 55    • Total Protein 09/01/2023 7.4    • Albumin 09/01/2023 4.7    • Total Bilirubin 09/01/2023 0.46    • eGFR 09/01/2023 80    • Lipase 09/01/2023 16    • ABO Grouping 09/01/2023 A    • Rh Factor 09/01/2023 Positive    • Antibody Screen 09/01/2023 Negative    • Specimen Expiration Date 09/01/2023 [id number]    • Segmented % 09/01/2023 72    • Lymphocytes % 09/01/2023 22    • Monocytes % 09/01/2023 4    • Eosinophils, % 09/01/2023 1    • Basophils % 09/01/2023 1    • Absolute Neutrophils 09/01/2023 11.58 (H)    • Lymphocytes Absolute 09/01/2023 3.54    • Monocytes Absolute 09/01/2023 0.64    • Eosinophils Absolute 09/01/2023 0.16    • Basophils Absolute 09/01/2023 0.16 (H)    • RBC Morphology 09/01/2023 Present    • Platelet Estimate 22/17/1846 Adequate    • Stomatocytes 09/01/2023 Present    • Procalcitonin 09/01/2023 0.07    • WBC 09/02/2023 8.78    • RBC 09/02/2023 2.32 (L)    • Hemoglobin 09/02/2023 7.0 (L)    • Hematocrit 09/02/2023 21.6 (L)    • MCV 09/02/2023 93    • MCH 09/02/2023 30.2    • MCHC 09/02/2023 32.4    • RDW 09/02/2023 13.2    • Platelets 46/86/7695 233    • MPV 09/02/2023 10.9    • LACTIC ACID 09/02/2023 1.0        Imaging Studies: I have personally reviewed pertinent imaging studies. ASSESSMENT & PLAN:  Coffee-ground emesis  Melena  -Patient presents to the [facility] emergency department the chief complaint of coffee-ground emesis and melena.  -Patient's hemoglobin level this morning is 7.0. We will continue to monitor and transfuse as necessary.  -Patient does report daily ibuprofen use. -Patient does report daily alcohol use. -Patient's liver enzyme panel is normal.  -Patient is currently n.p.o. in preparation for EGD this morning.  -Will check CT abdomen and pelvis. -Continue pantoprazole 40 mg IV twice daily.  -Serial abdominal exams.  -Antiemetics. -Further recommendation be made after patient's endoscopic evaluation is complete.  -Recommend alcohol cessation    Patient be seen and examined by Dr. Dustin Lawton. All key medical decisions will be made by Dr. Dustin Lawton.       Kamilla La PA-C  9/2/2023,9:12 AM

## 2023-09-02 NOTE — ED PROVIDER NOTES
History  Chief Complaint   Patient presents with   • Black or Bloody Stool     Pt reports shes been having loose black stool since Tuesday, as well as vomiting black. 62-year-old female presents emergency department for evaluation of dark stool. Patient's been having some GI symptoms since Tuesday, nausea vomiting diarrhea, more recently she started to have what seemed like coffee-ground emesis/dark emesis and also dark stool as well. Felt somewhat lightheaded but no chest pain palpitations syncope or presyncope. No fevers or chills. Prior to Admission Medications   Prescriptions Last Dose Informant Patient Reported?  Taking?   aspirin 81 mg chewable tablet  Self Yes No   Sig: Chew 81 mg daily   estrogens, conjugated (Premarin) vaginal cream   No No   Sig: Insert 0.5 g into the vagina 2 (two) times a week   lisinopril (ZESTRIL) 10 mg tablet   No No   Sig: TAKE ONE TABLET BY MOUTH EVERY DAY   rosuvastatin (CRESTOR) 10 MG tablet   No No   Sig: Take 1 tablet (10 mg total) by mouth daily      Facility-Administered Medications: None       Past Medical History:   Diagnosis Date   • Abnormal Pap smear of cervix    • Hearing loss    • Hyperlipemia    • Hypertension    • Urinary tract infection        Past Surgical History:   Procedure Laterality Date   • APPENDECTOMY     • KNEE SURGERY         Family History   Problem Relation Age of Onset   • Heart disease Mother    • Diabetes Mother    • Kidney disease Mother    • Angina Mother    • Coronary artery disease Mother    • Hyperlipidemia Mother    • Dialysis Mother    • Stroke Mother    • Heart disease Father    • Diabetes Father    • Prostate cancer Father 76   • Coronary artery disease Father    • Cancer Father         bladder cancer   • Hyperlipidemia Father    • No Known Problems Sister    • No Known Problems Maternal Grandmother    • Heart disease Paternal Grandmother    • Coronary artery disease Brother    • Diabetes Brother    • Heart disease Brother • Hyperlipidemia Brother    • No Known Problems Paternal Aunt    • No Known Problems Paternal Aunt    • No Known Problems Paternal Aunt    • Breast cancer Neg Hx    • Colon cancer Neg Hx    • Ovarian cancer Neg Hx    • Cervical cancer Neg Hx    • Uterine cancer Neg Hx      I have reviewed and agree with the history as documented. E-Cigarette/Vaping   • E-Cigarette Use Former User      E-Cigarette/Vaping Substances   • Nicotine No    • THC No    • CBD No    • Flavoring No    • Other No    • Unknown No      Social History     Tobacco Use   • Smoking status: Former     Packs/day: 0.00     Types: Cigarettes     Start date: 1980     Quit date: 10/12/1990     Years since quittin.9   • Smokeless tobacco: Never   • Tobacco comments:     never a heavy user; social smoker   Vaping Use   • Vaping Use: Former   Substance Use Topics   • Alcohol use: Yes     Alcohol/week: 2.0 standard drinks of alcohol     Types: 2 Glasses of wine per week   • Drug use: No       Review of Systems   Constitutional: Negative for appetite change, chills, fatigue and fever. HENT: Negative for sneezing and sore throat. Eyes: Negative for visual disturbance. Respiratory: Negative for cough, choking, chest tightness, shortness of breath and wheezing. Cardiovascular: Negative for chest pain and palpitations. Gastrointestinal: Positive for blood in stool, nausea and vomiting. Negative for abdominal pain, constipation and diarrhea. Genitourinary: Negative for difficulty urinating and dysuria. Neurological: Negative for dizziness, weakness, light-headedness, numbness and headaches. All other systems reviewed and are negative. Physical Exam  Physical Exam  Vitals and nursing note reviewed. Constitutional:       General: She is not in acute distress. Appearance: She is well-developed. She is not diaphoretic. HENT:      Head: Normocephalic and atraumatic.    Eyes:      Pupils: Pupils are equal, round, and reactive to light.   Neck:      Vascular: No JVD. Trachea: No tracheal deviation. Cardiovascular:      Rate and Rhythm: Normal rate and regular rhythm. Heart sounds: Normal heart sounds. No murmur heard. No friction rub. No gallop. Pulmonary:      Effort: Pulmonary effort is normal. No respiratory distress. Breath sounds: Normal breath sounds. No wheezing or rales. Abdominal:      General: Bowel sounds are normal. There is no distension. Palpations: Abdomen is soft. Tenderness: There is no abdominal tenderness. There is no guarding or rebound. Skin:     General: Skin is warm and dry. Coloration: Skin is not pale. Neurological:      Mental Status: She is alert and oriented to person, place, and time. Cranial Nerves: No cranial nerve deficit. Motor: No abnormal muscle tone.    Psychiatric:         Behavior: Behavior normal.         Vital Signs  ED Triage Vitals   Temperature Pulse Respirations Blood Pressure SpO2   09/01/23 1632 09/01/23 1632 09/01/23 1632 09/01/23 1632 09/01/23 1632   (!) 97.4 °F (36.3 °C) (!) 138 18 144/79 92 %      Temp Source Heart Rate Source Patient Position - Orthostatic VS BP Location FiO2 (%)   09/01/23 1632 09/01/23 1632 09/01/23 1632 09/01/23 1632 --   Temporal Monitor Sitting Left arm       Pain Score       09/01/23 2135       No Pain           Vitals:    09/01/23 1930 09/01/23 2030 09/01/23 2130 09/02/23 0026   BP: 119/61 118/64 147/93    Pulse: (!) 107 103 91 96   Patient Position - Orthostatic VS: Lying Lying           Visual Acuity      ED Medications  Medications   atorvastatin (LIPITOR) tablet 20 mg (has no administration in time range)   lisinopril (ZESTRIL) tablet 10 mg (has no administration in time range)   pantoprazole (PROTONIX) 80 mg in sodium chloride 0.9 % 100 mL infusion (8 mg/hr Intravenous New Bag 9/1/23 7771)   acetaminophen (TYLENOL) tablet 650 mg (has no administration in time range)   aluminum-magnesium hydroxide-simethicone (MAALOX) oral suspension 30 mL (has no administration in time range)   ondansetron (ZOFRAN) injection 4 mg (has no administration in time range)   sodium chloride 0.9 % bolus 1,000 mL (0 mL Intravenous Stopped 9/1/23 2002)   pantoprazole (PROTONIX) injection 40 mg (40 mg Intravenous Given 9/1/23 1820)   pantoprazole (PROTONIX) injection 40 mg (40 mg Intravenous Given 9/1/23 2011)   cefTRIAXone (ROCEPHIN) IVPB (premix in dextrose) 1,000 mg 50 mL (1,000 mg Intravenous New Bag 9/1/23 2012)   potassium chloride oral solution 40 mEq (40 mEq Oral Given 9/1/23 2156)   sodium chloride 0.9 % bolus 1,000 mL (1,000 mL Intravenous New Bag 9/1/23 2225)       Diagnostic Studies  Results Reviewed     Procedure Component Value Units Date/Time    RBC Morphology Reflex Test [[id number]] Collected: 09/01/23 1640    Lab Status: Final result Specimen: Blood from Arm, Right Updated: 09/01/23 1801    CBC and differential [[id number]]  (Abnormal) Collected: 09/01/23 1640    Lab Status: Final result Specimen: Blood from Arm, Right Updated: 09/01/23 1749     WBC 16.09 Thousand/uL      RBC 3.16 Million/uL      Hemoglobin 9.4 g/dL      Hematocrit 28.9 %      MCV 92 fL      MCH 29.7 pg      MCHC 32.5 g/dL      RDW 12.9 %      MPV 11.5 fL      Platelets 068 Thousands/uL     Narrative: This is an appended report. These results have been appended to a previously verified report.     Manual Differential(PHLEBS Do Not Order) [[id number]]  (Abnormal) Collected: 09/01/23 1640    Lab Status: Final result Specimen: Blood from Arm, Right Updated: 09/01/23 1749     Segmented % 72 %      Lymphocytes % 22 %      Monocytes % 4 %      Eosinophils, % 1 %      Basophils % 1 %      Absolute Neutrophils 11.58 Thousand/uL      Lymphocytes Absolute 3.54 Thousand/uL      Monocytes Absolute 0.64 Thousand/uL      Eosinophils Absolute 0.16 Thousand/uL      Basophils Absolute 0.16 Thousand/uL      Total Counted --     RBC Morphology Present     Platelet Estimate Adequate     Stomatocytes Present    Lipase [[id number]]  (Normal) Collected: 09/01/23 1640    Lab Status: Final result Specimen: Blood from Arm, Right Updated: 09/01/23 1704     Lipase 16 u/L     Comprehensive metabolic panel [[id number]]  (Abnormal) Collected: 09/01/23 1640    Lab Status: Final result Specimen: Blood from Arm, Right Updated: 09/01/23 1704     Sodium 138 mmol/L      Potassium 3.3 mmol/L      Chloride 102 mmol/L      CO2 21 mmol/L      ANION GAP 15 mmol/L      BUN 32 mg/dL      Creatinine 0.82 mg/dL      Glucose 140 mg/dL      Calcium 9.6 mg/dL      AST 12 U/L      ALT 12 U/L      Alkaline Phosphatase 55 U/L      Total Protein 7.4 g/dL      Albumin 4.7 g/dL      Total Bilirubin 0.46 mg/dL      eGFR 80 ml/min/1.73sq m     Narrative:      [facility] guidelines for Chronic Kidney Disease (CKD):   •  Stage 1 with normal or high GFR (GFR > 90 mL/min/1.73 square meters)  •  Stage 2 Mild CKD (GFR = 60-89 mL/min/1.73 square meters)  •  Stage 3A Moderate CKD (GFR = 45-59 mL/min/1.73 square meters)  •  Stage 3B Moderate CKD (GFR = 30-44 mL/min/1.73 square meters)  •  Stage 4 Severe CKD (GFR = 15-29 mL/min/1.73 square meters)  •  Stage 5 End Stage CKD (GFR <15 mL/min/1.73 square meters)  Note: GFR calculation is accurate only with a steady state creatinine                 No orders to display              Procedures  Procedures         ED Course                                             Medical Decision Making  61-year-old female with possible upper GI bleed. No history of liver disease. She is on aspirin. Could be bleeding gastritis, check type and screen, labs, give fluids for tachycardia, recommend admission given concern for upper GI bleed and tachycardia on initial evaluation. Amount and/or Complexity of Data Reviewed  Labs: ordered. Risk  Prescription drug management. Decision regarding hospitalization.           Disposition  Final diagnoses:   GI bleed     Time reflects when diagnosis was documented in both MDM as applicable and the Disposition within this note     Time User Action Codes Description Comment    9/1/2023  8:29 PM Adarsh Morales Add [K92.2] GI bleed       ED Disposition     ED Disposition   Admit    Condition   Stable    Date/Time   Fri Sep 1, 2023  8:20 PM    Comment   Case was discussed with NISH and the patient's admission status was agreed to be Admission Status: inpatient status to the service of Dr. Armaan Daugherty . Follow-up Information    None         Current Discharge Medication List      CONTINUE these medications which have NOT CHANGED    Details   aspirin 81 mg chewable tablet Chew 81 mg daily      estrogens, conjugated (Premarin) vaginal cream Insert 0.5 g into the vagina 2 (two) times a week  Qty: 30 g, Refills: 1    Associated Diagnoses: Vaginal atrophy      lisinopril (ZESTRIL) 10 mg tablet TAKE ONE TABLET BY MOUTH EVERY DAY  Qty: 90 tablet, Refills: 0    Associated Diagnoses: Essential hypertension      rosuvastatin (CRESTOR) 10 MG tablet Take 1 tablet (10 mg total) by mouth daily  Qty: 90 tablet, Refills: 0    Associated Diagnoses: Mixed hyperlipidemia             No discharge procedures on file.     PDMP Review     None          ED Provider  Electronically Signed by           Nu Mancera MD  09/02/23 0384

## 2023-09-02 NOTE — PLAN OF CARE
Problem: PAIN - ADULT  Goal: Verbalizes/displays adequate comfort level or baseline comfort level  Description: Interventions:  - Encourage patient to monitor pain and request assistance  - Assess pain using appropriate pain scale  - Administer analgesics based on type and severity of pain and evaluate response  - Implement non-pharmacological measures as appropriate and evaluate response  - Consider cultural and social influences on pain and pain management  - Notify physician/advanced practitioner if interventions unsuccessful or patient reports new pain  Outcome: Progressing     Problem: Knowledge Deficit  Goal: Patient/family/caregiver demonstrates understanding of disease process, treatment plan, medications, and discharge instructions  Description: Complete learning assessment and assess knowledge base.   Interventions:  - Provide teaching at level of understanding  - Provide teaching via preferred learning methods  Outcome: Progressing     Problem: HEMATOLOGIC - ADULT  Goal: Maintains hematologic stability  Description: INTERVENTIONS  - Assess for signs and symptoms of bleeding or hemorrhage  - Monitor labs  - Administer supportive blood products/factors as ordered and appropriate  Outcome: Progressing

## 2023-09-02 NOTE — ASSESSMENT & PLAN NOTE
· Meeting criteria due to tachycardia and leukocytosis which are most likely reactionary from vomiting and diarrhea  · No clear infectious source at this time, so we will hold off on further antibiotic doses at this time as patient did receive a one-time dose of IV ceftriaxone in the ED tonight  · We will check procalcitonin, lactic acid, blood culture x2  · Given IV fluid bolus in the ED

## 2023-09-02 NOTE — ASSESSMENT & PLAN NOTE
Present on admission as evidenced by tachycardia and leukocytosis. White count had normalized over the last 24 hours. She received one dose of ceftriaxone in the ED. No UA was sent . Possible demargination from vomiting and diarrhea. Will send UA for thoroughness.

## 2023-09-02 NOTE — ASSESSMENT & PLAN NOTE
Pt underwent EGD showing :   FINDINGS:  • The esophagus appeared normal.  • Cratered, round, benign-appearing ulcer in the prepyloric region with clean base (Sen III); performed cold forceps biopsy. An erythematous fold was present adjacent to the ulcer. • The duodenum appeared normal.  • Clear liquid diet for today, advance diet in am if all well.   • Transition to BID dosing for protonix  • Pt will f/u with GI outpt for biopsy results  • Hopeful for DC in am.

## 2023-09-02 NOTE — ASSESSMENT & PLAN NOTE
· Rule out upper GI bleed  · Hemoglobin currently 9.4, was last 12.8 in October 2022  · Continue to monitor H/H every 12 hours, consider blood transfusion for hemoglobin less than 7  · Been screen obtained in ED

## 2023-09-02 NOTE — ANESTHESIA POSTPROCEDURE EVALUATION
Post-Op Assessment Note    CV Status:  Stable    Pain management: adequate     Mental Status:  Alert and awake   Hydration Status:  Euvolemic   PONV Controlled:  Controlled   Airway Patency:  Patent      Post Op Vitals Reviewed: Yes      Staff: CRNA         No notable events documented.     BP      Temp      Pulse     Resp      SpO2

## 2023-09-03 VITALS
RESPIRATION RATE: 19 BRPM | HEART RATE: 98 BPM | HEIGHT: 66 IN | SYSTOLIC BLOOD PRESSURE: 124 MMHG | WEIGHT: 151 LBS | OXYGEN SATURATION: 100 % | DIASTOLIC BLOOD PRESSURE: 79 MMHG | BODY MASS INDEX: 24.27 KG/M2 | TEMPERATURE: 97.7 F

## 2023-09-03 LAB
ABO GROUP BLD: NORMAL
ANION GAP SERPL CALCULATED.3IONS-SCNC: 7 MMOL/L
BLD GP AB SCN SERPL QL: NEGATIVE
BUN SERPL-MCNC: 7 MG/DL (ref 5–25)
CALCIUM SERPL-MCNC: 8.4 MG/DL (ref 8.4–10.2)
CHLORIDE SERPL-SCNC: 108 MMOL/L (ref 96–108)
CO2 SERPL-SCNC: 23 MMOL/L (ref 21–32)
CREAT SERPL-MCNC: 0.59 MG/DL (ref 0.6–1.3)
ERYTHROCYTE [DISTWIDTH] IN BLOOD BY AUTOMATED COUNT: 13.3 % (ref 11.6–15.1)
FERRITIN SERPL-MCNC: 39 NG/ML (ref 11–307)
GFR SERPL CREATININE-BSD FRML MDRD: 102 ML/MIN/1.73SQ M
GLUCOSE SERPL-MCNC: 97 MG/DL (ref 65–140)
HCT VFR BLD AUTO: 21.9 % (ref 34.8–46.1)
HCT VFR BLD AUTO: 23.6 % (ref 34.8–46.1)
HGB BLD-MCNC: 6.9 G/DL (ref 11.5–15.4)
HGB BLD-MCNC: 7.7 G/DL (ref 11.5–15.4)
IRON SATN MFR SERPL: 23 % (ref 15–50)
IRON SERPL-MCNC: 61 UG/DL (ref 50–212)
MAGNESIUM SERPL-MCNC: 2.1 MG/DL (ref 1.9–2.7)
MCH RBC QN AUTO: 30.4 PG (ref 26.8–34.3)
MCHC RBC AUTO-ENTMCNC: 31.5 G/DL (ref 31.4–37.4)
MCV RBC AUTO: 97 FL (ref 82–98)
PLATELET # BLD AUTO: 246 THOUSANDS/UL (ref 149–390)
PMV BLD AUTO: 11.6 FL (ref 8.9–12.7)
POTASSIUM SERPL-SCNC: 3.6 MMOL/L (ref 3.5–5.3)
RBC # BLD AUTO: 2.27 MILLION/UL (ref 3.81–5.12)
RH BLD: POSITIVE
SODIUM SERPL-SCNC: 138 MMOL/L (ref 135–147)
SPECIMEN EXPIRATION DATE: NORMAL
TIBC SERPL-MCNC: 264 UG/DL (ref 250–450)
UIBC SERPL-MCNC: 203 UG/DL (ref 155–355)
WBC # BLD AUTO: 7.79 THOUSAND/UL (ref 4.31–10.16)

## 2023-09-03 PROCEDURE — 86901 BLOOD TYPING SEROLOGIC RH(D): CPT | Performed by: INTERNAL MEDICINE

## 2023-09-03 PROCEDURE — 80048 BASIC METABOLIC PNL TOTAL CA: CPT | Performed by: INTERNAL MEDICINE

## 2023-09-03 PROCEDURE — 83735 ASSAY OF MAGNESIUM: CPT | Performed by: INTERNAL MEDICINE

## 2023-09-03 PROCEDURE — 83550 IRON BINDING TEST: CPT | Performed by: INTERNAL MEDICINE

## 2023-09-03 PROCEDURE — 99238 HOSP IP/OBS DSCHRG MGMT 30/<: CPT | Performed by: PHYSICIAN ASSISTANT

## 2023-09-03 PROCEDURE — 86850 RBC ANTIBODY SCREEN: CPT | Performed by: INTERNAL MEDICINE

## 2023-09-03 PROCEDURE — 83540 ASSAY OF IRON: CPT | Performed by: INTERNAL MEDICINE

## 2023-09-03 PROCEDURE — 82728 ASSAY OF FERRITIN: CPT | Performed by: INTERNAL MEDICINE

## 2023-09-03 PROCEDURE — 85018 HEMOGLOBIN: CPT | Performed by: PHYSICIAN ASSISTANT

## 2023-09-03 PROCEDURE — 85027 COMPLETE CBC AUTOMATED: CPT | Performed by: INTERNAL MEDICINE

## 2023-09-03 PROCEDURE — 86900 BLOOD TYPING SEROLOGIC ABO: CPT | Performed by: INTERNAL MEDICINE

## 2023-09-03 PROCEDURE — 85014 HEMATOCRIT: CPT | Performed by: PHYSICIAN ASSISTANT

## 2023-09-03 PROCEDURE — 99232 SBSQ HOSP IP/OBS MODERATE 35: CPT | Performed by: INTERNAL MEDICINE

## 2023-09-03 RX ORDER — PANTOPRAZOLE SODIUM 40 MG/1
40 TABLET, DELAYED RELEASE ORAL
Qty: 60 TABLET | Refills: 0 | Status: SHIPPED | OUTPATIENT
Start: 2023-09-03 | End: 2023-10-03

## 2023-09-03 RX ADMIN — PANTOPRAZOLE SODIUM 40 MG: 40 TABLET, DELAYED RELEASE ORAL at 08:00

## 2023-09-03 RX ADMIN — ACETAMINOPHEN 650 MG: 325 TABLET, FILM COATED ORAL at 08:19

## 2023-09-03 RX ADMIN — LISINOPRIL 10 MG: 10 TABLET ORAL at 08:15

## 2023-09-03 NOTE — PLAN OF CARE
Problem: Knowledge Deficit  Goal: Patient/family/caregiver demonstrates understanding of disease process, treatment plan, medications, and discharge instructions  Description: Complete learning assessment and assess knowledge base.   Interventions:  - Provide teaching at level of understanding  - Provide teaching via preferred learning methods  Outcome: Progressing     Problem: DISCHARGE PLANNING  Goal: Discharge to home or other facility with appropriate resources  Description: INTERVENTIONS:  - Identify barriers to discharge w/patient and caregiver  - Arrange for needed discharge resources and transportation as appropriate  - Identify discharge learning needs (meds, wound care, etc.)  - Arrange for interpretive services to assist at discharge as needed  - Refer to Case Management Department for coordinating discharge planning if the patient needs post-hospital services based on physician/advanced practitioner order or complex needs related to functional status, cognitive ability, or social support system  Outcome: Progressing

## 2023-09-03 NOTE — DISCHARGE SUMMARY
[address]  Discharge- Thee Large 1965, 66 y.o. female MRN: [id number]  Unit/Bed#: -01 Encounter: [id number]  Primary Care Provider: Leann Keene DO   Date and time admitted to hospital: 9/1/2023  4:50 PM    Acute prepyloric ulcer  Assessment & Plan  Pt underwent EGD showing :   FINDINGS:  • The esophagus appeared normal.  • Cratered, round, benign-appearing ulcer in the prepyloric region with clean base (Sen III); performed cold forceps biopsy. An erythematous fold was present adjacent to the ulcer. • The duodenum appeared normal.  • The patient's diet was advanced and she tolerated this  • She was discharged on BID protonix  • Pt will f/u with GI outpt for biopsy results and need for repeat EGD in 8-12 weeks. * Melena  Assessment & Plan  · 2 episodes on the day of admission and 2 episodes of hematemesis on Wednesday with associated tachycardia  · EGD shows prepyloric ulcer, non bleeding  · Hemoglobin 6.9 then increased to 7.7 on repeat  · Outpatient follow-up with PCP    SIRS (systemic inflammatory response syndrome) ([address])  Assessment & Plan  Present on admission as evidenced by tachycardia and leukocytosis. White count had normalized. She received one dose of ceftriaxone in the ED. No UA was sent however patient without UTI symptoms . Possible demargination from vomiting and diarrhea. Hypokalemia  Assessment & Plan  · Potassium slightly decreased at 3.3 on admission, repleted. · 3.6 on day of discharge    Acute blood loss anemia  Assessment & Plan  · Pt found on EGD to have a non bleeding pre-pyloric ulcer. · Hemoglobin was last 12.8 in October 2022  · Continue Protonix  BID   · Noted to be 6.9 then improved to 7.7 on repeat    Mixed hyperlipidemia  Assessment & Plan  Continue statin      Primary hypertension  Assessment & Plan  BP stable tolerating ACE.        Medical Problems     Resolved Problems  Date Reviewed: 9/3/2023   None       Discharging Physician / Practitioner: Yusef Melton PA-C  PCP: Dinora Villaseñor DO  Admission Date:   Admission Orders (From admission, onward)     Ordered        09/01/23 2033  INPATIENT ADMISSION  Once                      Discharge Date: 09/03/23    Consultations During Hospital Stay:  · Gastroenterology    Procedures Performed:   · EGD    Significant Findings / Test Results:   EGD    Result Date: 9/2/2023  Impression: The esophagus appeared normal. Ulcer in the prepyloric region with clean base (Sen III); performed cold forceps biopsy The duodenum appeared normal. RECOMMENDATION: Can covert to oral pantoprazole 40 mg twice daily x 8 weeks and then once daily thereafter Follow up biopsy results Follow up in the office Avoid alcohol and NSAIDS   Ky Waddell MD     CT abdomen pelvis w contrast    Result Date: 9/2/2023  · Impression: 1. Gastric antral thickening, which may represent gastritis versus gastric antral mass. Endoscopy is recommended for further evaluation. 2. Several punctate foci of hyperdensity in the ascending colon, which may be related to bismuth subsalicylate ingestion. However, given melena tiny foci of blood products cannot be excluded. Correlate with ingestion. Workstation performed: [id number]   ·     Incidental Findings:   · none       Test Results Pending at Discharge (will require follow up): · Biopsy from EGD- outpatient follow-up with GI for results     Outpatient Tests Requested:  · none    Complications:  none    Reason for Admission: GI bleed    Hospital Course:   Christina Leal is a 66 y.o. female patient who originally presented to the hospital on 9/1/2023 due to black stools and black emesis. Please see H&P by Alexis Albert PA-C dated 9/1/2023 for complete details of presentation. The patient was started on Protonix gtt and GI consulted.  The patient met SIRS criteria and was given IV rocephin x 1 dose in the ED, this was not continued on admission as there was no source of infection. The patient was given po potassium. Hemoglobin was noted to be trending down. The patient was taken for an EGD which showed an acute prepyloric ulcer. The patient was switched to BID Protonix. She was started on a clear liquid diet which was advanced on the day of discharge. The patient tolerated this diet. The patient's hgb was noted to trend down 7.9 >7.0 >6.9. then improved to 7.7 on repeat. No blood transfusion required. The patient was discharged home on Protonix bid and instructed to follow-up with GI for results of biopsies taken during EGD as well as need for repeat EGD in 8-12 weeks. Please see above list of diagnoses and related plan for additional information. Condition at Discharge: good    Discharge Day Visit / Exam:   Subjective:    Vitals: Blood Pressure: 124/79 (09/03/23 0731)  Pulse: 98 (09/03/23 0731)  Temperature: 97.7 °F (36.5 °C) (09/03/23 0731)  Temp Source: Temporal (09/02/23 1145)  Respirations: 19 (09/02/23 2137)  Height: 5' 6" (167.6 cm) (09/01/23 2135)  Weight - Scale: 68.5 kg (151 lb) (09/01/23 2135)  SpO2: 100 % (09/03/23 0731)  Exam:   Physical Exam  Vitals and nursing note reviewed. Constitutional:       Appearance: Normal appearance. HENT:      Head: Normocephalic and atraumatic. Cardiovascular:      Rate and Rhythm: Normal rate and regular rhythm. Pulses: Normal pulses. Heart sounds: Normal heart sounds. Pulmonary:      Effort: Pulmonary effort is normal.      Breath sounds: Normal breath sounds. Abdominal:      General: There is no distension. Palpations: Abdomen is soft. Tenderness: There is no abdominal tenderness. Skin:     General: Skin is warm and dry. Neurological:      General: No focal deficit present. Mental Status: She is alert and oriented to person, place, and time. Psychiatric:         Mood and Affect: Mood normal.         Behavior: Behavior normal.         Thought Content:  Thought content normal. Judgment: Judgment normal.          Discussion with Family: Updated  () at bedside. Discharge instructions/Information to patient and family:   See after visit summary for information provided to patient and family. Provisions for Follow-Up Care:  See after visit summary for information related to follow-up care and any pertinent home health orders. Disposition:   Home    Planned Readmission: no     Discharge Statement:  I spent 25 minutes discharging the patient. This time was spent on the day of discharge. I had direct contact with the patient on the day of discharge. Greater than 50% of the total time was spent examining patient, answering all patient questions, arranging and discussing plan of care with patient as well as directly providing post-discharge instructions. Additional time then spent on discharge activities. Discharge Medications:  See after visit summary for reconciled discharge medications provided to patient and/or family.       **Please Note: This note may have been constructed using a voice recognition system**

## 2023-09-03 NOTE — PROGRESS NOTES
Progress Note  Rudolph Zuniga 66 y.o. female MRN: [id number]  Unit/Bed#: -01 Encounter: [id number]    Subjective:  Patient has had no further episodes of GI bleeding. Patient tolerating a clear liquid diet. Unfortunately patient's hemoglobin level did drop to 6.9 this morning. Repeat H&H just performed was 7.7. Objective:     Vitals:   Vitals:    09/03/23 0731   BP: 124/79   Pulse: 98   Resp:    Temp: 97.7 °F (36.5 °C)   SpO2: 100%   ,Body mass index is 24.37 kg/m².       Intake/Output Summary (Last 24 hours) at 9/3/2023 0931  Last data filed at 9/2/2023 1218  Gross per 24 hour   Intake 240 ml   Output --   Net 240 ml       Physical Exam:     General Appearance: Alert, appears stated age and cooperative  Lungs: Clear to auscultation bilaterally, no rales or rhonchi, no labored breathing/accessory muscle use  Heart: Regular rate and rhythm, S1, S2 normal, no murmur, click, rub or gallop  Abdomen: Soft, non-tender, non-distended; bowel sounds normal; no masses or no organomegaly  Extremities: No cyanosis, edema    Invasive Devices     Peripheral Intravenous Line  Duration           Peripheral IV 09/01/23 Right Antecubital 1 day                Lab Results:  Admission on 09/01/2023   Component Date Value   • WBC 09/01/2023 16.09 (H)    • RBC 09/01/2023 3.16 (L)    • Hemoglobin 09/01/2023 9.4 (L)    • Hematocrit 09/01/2023 28.9 (L)    • MCV 09/01/2023 92    • MCH 09/01/2023 29.7    • MCHC 09/01/2023 32.5    • RDW 09/01/2023 12.9    • MPV 09/01/2023 11.5    • Platelets 08/61/6080 368    • Sodium 09/01/2023 138    • Potassium 09/01/2023 3.3 (L)    • Chloride 09/01/2023 102    • CO2 09/01/2023 21    • ANION GAP 09/01/2023 15    • BUN 09/01/2023 32 (H)    • Creatinine 09/01/2023 0.82    • Glucose 09/01/2023 140    • Calcium 09/01/2023 9.6    • AST 09/01/2023 12 (L)    • ALT 09/01/2023 12    • Alkaline Phosphatase 09/01/2023 55    • Total Protein 09/01/2023 7.4    • Albumin 09/01/2023 4.7    • Total Bilirubin 09/01/2023 0.46    • eGFR 09/01/2023 80    • Lipase 09/01/2023 16    • ABO Grouping 09/01/2023 A    • Rh Factor 09/01/2023 Positive    • Antibody Screen 09/01/2023 Negative    • Specimen Expiration Date 09/01/2023 [id number]    • Segmented % 09/01/2023 72    • Lymphocytes % 09/01/2023 22    • Monocytes % 09/01/2023 4    • Eosinophils, % 09/01/2023 1    • Basophils % 09/01/2023 1    • Absolute Neutrophils 09/01/2023 11.58 (H)    • Lymphocytes Absolute 09/01/2023 3.54    • Monocytes Absolute 09/01/2023 0.64    • Eosinophils Absolute 09/01/2023 0.16    • Basophils Absolute 09/01/2023 0.16 (H)    • RBC Morphology 09/01/2023 Present    • Platelet Estimate 75/49/9557 Adequate    • Stomatocytes 09/01/2023 Present    • Procalcitonin 09/01/2023 0.07    • WBC 09/02/2023 8.78    • RBC 09/02/2023 2.32 (L)    • Hemoglobin 09/02/2023 7.0 (L)    • Hematocrit 09/02/2023 21.6 (L)    • MCV 09/02/2023 93    • MCH 09/02/2023 30.2    • MCHC 09/02/2023 32.4    • RDW 09/02/2023 13.2    • Platelets 44/94/8756 233    • MPV 09/02/2023 10.9    • LACTIC ACID 09/02/2023 1.0    • Blood Culture 09/02/2023 Received in Microbiology Lab. Culture in Progress. • Blood Culture 09/02/2023 Received in Microbiology Lab. Culture in Progress.     • WBC 09/02/2023 10.81 (H)    • RBC 09/02/2023 2.56 (L)    • Hemoglobin 09/02/2023 7.9 (L)    • Hematocrit 09/02/2023 24.1 (L)    • MCV 09/02/2023 94    • MCH 09/02/2023 30.9    • MCHC 09/02/2023 32.8    • RDW 09/02/2023 13.2    • Platelets 17/84/4822 270    • MPV 09/02/2023 11.5    • Sodium 09/02/2023 137    • Potassium 09/02/2023 3.5    • Chloride 09/02/2023 108    • CO2 09/02/2023 21    • ANION GAP 09/02/2023 8    • BUN 09/02/2023 15    • Creatinine 09/02/2023 0.68    • Glucose 09/02/2023 100    • Calcium 09/02/2023 8.3 (L)    • eGFR 09/02/2023 98    • Color, UA 09/02/2023 Colorless    • Clarity, UA 09/02/2023 Clear    • Specific [place], UA 09/02/2023 1.012    • pH, UA 09/02/2023 6.0    • Leukocytes, UA 09/02/2023 Negative    • Nitrite, UA 09/02/2023 Negative    • Protein, UA 09/02/2023 Negative    • Glucose, UA 09/02/2023 Negative    • Ketones, UA 09/02/2023 Trace (A)    • Urobilinogen, UA 09/02/2023 <2.0    • Bilirubin, UA 09/02/2023 Negative    • Occult Blood, UA 09/02/2023 Trace (A)    • Hemoglobin 09/02/2023 7.0 (L)    • Hematocrit 09/02/2023 22.0 (L)    • RBC, UA 09/02/2023 1-2    • WBC, UA 09/02/2023 1-2    • Epithelial Cells 09/02/2023 Occasional    • Bacteria, UA 09/02/2023 Innumerable (A)    • WBC 09/03/2023 7.79    • RBC 09/03/2023 2.27 (L)    • Hemoglobin 09/03/2023 6.9 (LL)    • Hematocrit 09/03/2023 21.9 (L)    • MCV 09/03/2023 97    • MCH 09/03/2023 30.4    • MCHC 09/03/2023 31.5    • RDW 09/03/2023 13.3    • Platelets 81/17/1750 246    • MPV 09/03/2023 11.6    • Sodium 09/03/2023 138    • Potassium 09/03/2023 3.6    • Chloride 09/03/2023 108    • CO2 09/03/2023 23    • ANION GAP 09/03/2023 7    • BUN 09/03/2023 7    • Creatinine 09/03/2023 0.59 (L)    • Glucose 09/03/2023 97    • Calcium 09/03/2023 8.4    • eGFR 09/03/2023 102    • Magnesium 09/03/2023 2.1    • ABO Grouping 09/03/2023 A    • Rh Factor 09/03/2023 Positive    • Antibody Screen 09/03/2023 Negative    • Specimen Expiration Date 09/03/2023 [id number]    • Hemoglobin 09/03/2023 7.7 (L)    • Hematocrit 09/03/2023 23.6 (L)        Imaging Studies:   I have personally reviewed pertinent imaging studies. EGD    Result Date: 9/2/2023  Narrative: Table formatting from the original result was not included. [address] [place] [address] Endoscopy [address] [place] [address] [phone] DATE OF SERVICE: 9/02/23 PHYSICIAN(S): Attending: Vilma Slade MD Fellow: No Staff Documented INDICATION: GI bleed POST-OP DIAGNOSIS: See the impression below. PREPROCEDURE: Informed consent was obtained for the procedure, including sedation. Risks of perforation, hemorrhage, adverse drug reaction and aspiration were discussed.  The patient was placed in the left lateral decubitus position. Patient was explained about the risks and benefits of the procedure. Risks including but not limited to bleeding, infection, and perforation were explained in detail. Also explained about less than 100% sensitivity with the exam and other alternatives. PROCEDURE: EGD DETAILS OF PROCEDURE: Patient was taken to the procedure room where a time out was performed to confirm correct patient and correct procedure. The patient underwent monitored anesthesia care, which was administered by an anesthesia professional. The patient's blood pressure, heart rate, level of consciousness, respirations and oxygen were monitored throughout the procedure. The scope was advanced to the second part of the duodenum. Retroflexion was performed in the fundus. The patient experienced no blood loss. The procedure was not difficult. The patient tolerated the procedure well. There were no apparent adverse events. ANESTHESIA INFORMATION: ASA: II Anesthesia Type: IV Sedation with Anesthesia MEDICATIONS: No administrations occurring from 1117 to 1127 on 09/02/23 FINDINGS: The esophagus appeared normal. Cratered, round, benign-appearing ulcer in the prepyloric region with clean base (Sen III); performed cold forceps biopsy. An erythematous fold was present adjacent to the ulcer.  The duodenum appeared normal. SPECIMENS: ID Type Source Tests Collected by Time Destination 1 : antrum Tissue Stomach TISSUE EXAM Pietro Solomon MD 9/2/2023 11:26 AM      Impression: The esophagus appeared normal. Ulcer in the prepyloric region with clean base (Sen III); performed cold forceps biopsy The duodenum appeared normal. RECOMMENDATION: Can covert to oral pantoprazole 40 mg twice daily x 8 weeks and then once daily thereafter Follow up biopsy results Follow up in the office Avoid alcohol and NSAIDS   Pietro Soolmon MD     CT abdomen pelvis w contrast    Result Date: 9/2/2023  Narrative: CT ABDOMEN AND PELVIS WITH IV CONTRAST INDICATION:   Melena melena. COMPARISON:  None. TECHNIQUE:  CT examination of the abdomen and pelvis was performed. Multiplanar 2D reformatted images were created from the source data. This examination, like all CT scans performed in the [facility], was performed utilizing techniques to minimize radiation dose exposure, including the use of iterative reconstruction and automated exposure control. Radiation dose length product (DLP) for this visit:  635 mGy-cm IV Contrast:  100 mL of iohexol (OMNIPAQUE) Enteric Contrast:  Enteric contrast was not administered. FINDINGS: ABDOMEN LOWER CHEST: Bibasilar atelectasis. LIVER/BILIARY TREE:  Unremarkable. GALLBLADDER:  No calcified gallstones. No pericholecystic inflammatory change. SPLEEN:  Unremarkable. PANCREAS:  Unremarkable. ADRENAL GLANDS:  Unremarkable. KIDNEYS/URETERS:  Unremarkable. No hydronephrosis. STOMACH AND BOWEL: There is colonic diverticulosis without evidence of acute diverticulitis. Several tiny foci of hyperdensity in the ascending colon. Gastric antral thickening. APPENDIX:  No findings to suggest appendicitis. ABDOMINOPELVIC CAVITY:  No ascites. No pneumoperitoneum. No lymphadenopathy. VESSELS: Atherosclerotic changes are present. No evidence of aneurysm. PELVIS REPRODUCTIVE ORGANS: Fibroid uterus. URINARY BLADDER:  Unremarkable. ABDOMINAL WALL/INGUINAL REGIONS:  Unremarkable. OSSEOUS STRUCTURES:  No acute fracture or destructive osseous lesion. Impression: 1. Gastric antral thickening, which may represent gastritis versus gastric antral mass. Endoscopy is recommended for further evaluation. 2. Several punctate foci of hyperdensity in the ascending colon, which may be related to bismuth subsalicylate ingestion. However, given melena tiny foci of blood products cannot be excluded. Correlate with ingestion.  Workstation performed: [id number]         Assessment & Plan  Acute blood loss anemia  Clean-based ulcer in the prepyloric region  -Patient's EGD from 9-23 showed the esophagus was normal.  There was ulcer in the prepyloric region with clean base. Biopsies were performed. The duodenum was normal.  -Follow-up pathology  -Pantoprazole 40 mg twice a day for 8 weeks.  -Patient will need repeat EGD in 8 to 12 weeks.  -Avoid anti-inflammatory medications.  -Avoid alcohol.  -Patient's repeat hemoglobin level was 7.7.  -Advance diet.    -Patient will likely be discharged home later on this afternoon.  -Patient will need close outpatient GI follow-up.  -Would recommend repeating a CBC next week. Patient will be seen and examined by Dr. Nell De La Paz.         Germain Chang PA-C  9/3/2023,9:31 AM

## 2023-09-03 NOTE — ASSESSMENT & PLAN NOTE
Pt underwent EGD showing :   FINDINGS:  • The esophagus appeared normal.  • Cratered, round, benign-appearing ulcer in the prepyloric region with clean base (Sen III); performed cold forceps biopsy. An erythematous fold was present adjacent to the ulcer. • The duodenum appeared normal.  • The patient's diet was advanced and she tolerated this  • She was discharged on BID protonix  • Pt will f/u with GI outpt for biopsy results and need for repeat EGD in 8-12 weeks.

## 2023-09-03 NOTE — ASSESSMENT & PLAN NOTE
Present on admission as evidenced by tachycardia and leukocytosis. White count had normalized. She received one dose of ceftriaxone in the ED. No UA was sent however patient without UTI symptoms . Possible demargination from vomiting and diarrhea.

## 2023-09-03 NOTE — ASSESSMENT & PLAN NOTE
· 2 episodes on the day of admission and 2 episodes of hematemesis on Wednesday with associated tachycardia  · EGD shows prepyloric ulcer, non bleeding  · Hemoglobin 6.9 then increased to 7.7 on repeat  · Outpatient follow-up with PCP

## 2023-09-03 NOTE — UTILIZATION REVIEW
NOTIFICATION OF INPATIENT ADMISSION   AUTHORIZATION REQUEST   SERVICING FACILITY:   [facility] [address]  Tax ID: [id number]  NPI: [id number] ATTENDING PROVIDER:  Attending Name and NPI#: Jacek Alexandrea [[id number]]  Address: BrinaKarthikeyan stephens[address]  Phone: [phone]     ADMISSION INFORMATION:  Place of Service: Inpatient [address] [facility]  Place of Service Code: 21  Inpatient Admission Date/Time: 9/1/23  8:33 PM  Discharge Date/Time: No discharge date for patient encounter. Admitting Diagnosis Code/Description:  Vomiting [R11.10]  GI bleed [K92.2]  Black stool [K92.1]     UTILIZATION REVIEW CONTACT:  Linnette Hudson Utilization   Network Utilization Review Department  Phone: [phone]  Fax [phone]  Email: [email or web address]  Contact for approvals/pending authorizations, clinical reviews, and discharge. PHYSICIAN ADVISORY SERVICES:  Medical Necessity Denial & [facility] Review  Phone: [phone]  Fax: [phone]  Email: [email or web address]

## 2023-09-03 NOTE — ASSESSMENT & PLAN NOTE
· Pt found on EGD to have a non bleeding pre-pyloric ulcer.    · Hemoglobin was last 12.8 in October 2022  · Continue Protonix  BID   · Noted to be 6.9 then improved to 7.7 on repeat

## 2023-09-05 ENCOUNTER — TRANSITIONAL CARE MANAGEMENT (OUTPATIENT)
Dept: FAMILY MEDICINE CLINIC | Facility: CLINIC | Age: 58
End: 2023-09-05

## 2023-09-06 NOTE — UTILIZATION REVIEW
NOTIFICATION OF ADMISSION DISCHARGE   This is a Notification of Discharge from [address] [place] [address]. Please be advised that this patient has been discharge from our facility. Below you will find the admission and discharge date and time including the patient’s disposition. UTILIZATION REVIEW CONTACT:  Yadira Correia  Utilization   Network Utilization Review Department  Phone: [phone] x carefully listen to the prompts. All voicemails are confidential.  Email: [email or web address]     ADMISSION INFORMATION  PRESENTATION DATE: 9/1/2023  4:50 PM  OBERVATION ADMISSION DATE:   INPATIENT ADMISSION DATE: 9/1/23  8:33 PM   DISCHARGE DATE: 9/3/2023  2:08 PM   DISPOSITION:Home/Self Care    IMPORTANT INFORMATION:  Send all requests for admission clinical reviews, approved or denied determinations and any other requests to dedicated fax number below belonging to the campus where the patient is receiving treatment.  List of dedicated fax numbers:  [place] DENIALS (Administrative/Medical Necessity) [phone] [address] [facility] (Maternity/NICU/Pediatrics) [phone]   [facility] [phone]   [facility] [phone]   [address] [facility] [phone]   [address] [place] [address] [phone]   [facility] [phone]   [address] [place] [address] [address] [place] [address] [phone]   [address] [place] [address] [phone] [address] [facility] [phone] [address] [facility] [address] [phone]

## 2023-09-07 PROBLEM — R65.10 SIRS (SYSTEMIC INFLAMMATORY RESPONSE SYNDROME) (HCC): Status: RESOLVED | Noted: 2023-09-01 | Resolved: 2023-09-07

## 2023-09-07 LAB
BACTERIA BLD CULT: NORMAL
BACTERIA BLD CULT: NORMAL

## 2023-09-07 NOTE — PROGRESS NOTES
Assessment & Plan     1. Melena  -     CBC and differential; Future    2. Coffee ground emesis  -     CBC and differential; Future    3. Acute blood loss anemia  -     CBC and differential; Future    4. Acute prepyloric ulcer  -     CBC and differential; Future    5. Encounter for support and coordination of transition of care    Will repeat CBC to monitor Hb. Reviewed pt's symptoms are common with anemia and should improve as her blood counts increase. Continue PPI, f/u with GI as scheduled -- discussed they will likely repeat EGD          Subjective     Transitional Care Management Review:   Claudine Newton is a 66 y.o. female here for TCM follow up. During the TCM phone call patient stated:  TCM Call     Date and time call was made  9/6/2023  8:46 AM    Hospital care reviewed  Records reviewed    Patient was hospitialized at  [facility]    Date of Admission  09/01/23    Date of discharge  09/03/23    Diagnosis  Melena    Disposition  Home    Were the patients medications reviewed and updated  No    Current Symptoms  Weakness    Weakness severity  Mild      TCM Call     Post hospital issues  None    Should patient be enrolled in anticoag monitoring? No    Scheduled for follow up? Yes    I have advised the patient to call PCP with any new or worsening symptoms  JOSSELYN SHARP        HPI     Pt presents for hospital follow up s/p admission to [facility] from 9/1-9/3. Pt initially presented due to coffee ground emesis and dark stools. Pt met SIRS criteria.    CT AP: Gastric antral thickening; several punctate foci of hyperdensity in ascending colon (possible bismuth ingestion vs blood); atherosclerotic changes  EGD showed pre-pyloric ulcer -- discharged on Protonix 40 mg BID x8 weeks, then to daily  Hb 9.4 --> 6.9 (lowest) --> 7.7 prior to discharge     Today:   Still feeling fatigued, shortness of breath, having some palpitations   Eating well  Had normal BM this morning     Review of Systems Constitutional: Positive for fatigue. Negative for appetite change. Respiratory: Positive for shortness of breath. Cardiovascular: Positive for palpitations. Negative for chest pain. Gastrointestinal: Negative for abdominal pain, constipation and diarrhea. Blood in stool: no further dark stools. Objective     /94 (BP Location: Left arm, Patient Position: Sitting, Cuff Size: Adult)   Pulse 81   Temp (!) 97 °F (36.1 °C)   Ht 5' 6" (1.676 m)   Wt 67.6 kg (149 lb)   LMP  (LMP Unknown)   SpO2 100%   BMI 24.05 kg/m²      Physical Exam  Vitals and nursing note reviewed. Constitutional:       General: She is not in acute distress. Appearance: She is well-developed. HENT:      Head: Normocephalic and atraumatic. Right Ear: Tympanic membrane, ear canal and external ear normal.      Left Ear: Tympanic membrane, ear canal and external ear normal.      Ears:      Comments: B/L hearing aids     Nose: Nose normal. No rhinorrhea. Mouth/Throat:      Mouth: Mucous membranes are moist.      Pharynx: No oropharyngeal exudate or posterior oropharyngeal erythema. Eyes:      Conjunctiva/sclera: Conjunctivae normal.   Neck:      Thyroid: No thyromegaly. Cardiovascular:      Rate and Rhythm: Normal rate and regular rhythm. Pulmonary:      Effort: Pulmonary effort is normal. No respiratory distress. Breath sounds: Normal breath sounds. Abdominal:      General: Bowel sounds are normal. There is no distension. Palpations: Abdomen is soft. Tenderness: There is no abdominal tenderness. Musculoskeletal:         General: Normal range of motion. Lymphadenopathy:      Cervical: No cervical adenopathy. Skin:     General: Skin is warm and dry. Neurological:      Mental Status: She is alert.       Comments: Grossly intact   Psychiatric:         Mood and Affect: Mood normal.       Medications have been reviewed by provider in current encounter    Traci Gomez DO

## 2023-09-08 ENCOUNTER — APPOINTMENT (OUTPATIENT)
Dept: LAB | Facility: CLINIC | Age: 58
End: 2023-09-08
Payer: COMMERCIAL

## 2023-09-08 ENCOUNTER — OFFICE VISIT (OUTPATIENT)
Dept: FAMILY MEDICINE CLINIC | Facility: CLINIC | Age: 58
End: 2023-09-08
Payer: COMMERCIAL

## 2023-09-08 VITALS
HEART RATE: 81 BPM | TEMPERATURE: 97 F | DIASTOLIC BLOOD PRESSURE: 94 MMHG | OXYGEN SATURATION: 100 % | WEIGHT: 149 LBS | HEIGHT: 66 IN | SYSTOLIC BLOOD PRESSURE: 138 MMHG | BODY MASS INDEX: 23.95 KG/M2

## 2023-09-08 DIAGNOSIS — N39.3 SUI (STRESS URINARY INCONTINENCE, FEMALE): ICD-10-CM

## 2023-09-08 DIAGNOSIS — K25.3 ACUTE PREPYLORIC ULCER: ICD-10-CM

## 2023-09-08 DIAGNOSIS — D62 ACUTE BLOOD LOSS ANEMIA: ICD-10-CM

## 2023-09-08 DIAGNOSIS — K92.0 COFFEE GROUND EMESIS: ICD-10-CM

## 2023-09-08 DIAGNOSIS — K92.1 MELENA: Primary | ICD-10-CM

## 2023-09-08 DIAGNOSIS — K92.1 MELENA: ICD-10-CM

## 2023-09-08 DIAGNOSIS — Z76.89 ENCOUNTER FOR SUPPORT AND COORDINATION OF TRANSITION OF CARE: ICD-10-CM

## 2023-09-08 PROBLEM — E87.6 HYPOKALEMIA: Status: RESOLVED | Noted: 2023-09-01 | Resolved: 2023-09-08

## 2023-09-08 LAB
BASOPHILS # BLD AUTO: 0.05 THOUSANDS/ÂΜL (ref 0–0.1)
BASOPHILS NFR BLD AUTO: 1 % (ref 0–1)
EOSINOPHIL # BLD AUTO: 0.19 THOUSAND/ÂΜL (ref 0–0.61)
EOSINOPHIL NFR BLD AUTO: 3 % (ref 0–6)
ERYTHROCYTE [DISTWIDTH] IN BLOOD BY AUTOMATED COUNT: 13.8 % (ref 11.6–15.1)
HCT VFR BLD AUTO: 24.9 % (ref 34.8–46.1)
HGB BLD-MCNC: 7.5 G/DL (ref 11.5–15.4)
IMM GRANULOCYTES # BLD AUTO: 0.03 THOUSAND/UL (ref 0–0.2)
IMM GRANULOCYTES NFR BLD AUTO: 1 % (ref 0–2)
LYMPHOCYTES # BLD AUTO: 1.92 THOUSANDS/ÂΜL (ref 0.6–4.47)
LYMPHOCYTES NFR BLD AUTO: 29 % (ref 14–44)
MCH RBC QN AUTO: 29.4 PG (ref 26.8–34.3)
MCHC RBC AUTO-ENTMCNC: 30.1 G/DL (ref 31.4–37.4)
MCV RBC AUTO: 98 FL (ref 82–98)
MONOCYTES # BLD AUTO: 0.5 THOUSAND/ÂΜL (ref 0.17–1.22)
MONOCYTES NFR BLD AUTO: 8 % (ref 4–12)
NEUTROPHILS # BLD AUTO: 3.94 THOUSANDS/ÂΜL (ref 1.85–7.62)
NEUTS SEG NFR BLD AUTO: 58 % (ref 43–75)
NRBC BLD AUTO-RTO: 0 /100 WBCS
PLATELET # BLD AUTO: 433 THOUSANDS/UL (ref 149–390)
PMV BLD AUTO: 12.1 FL (ref 8.9–12.7)
RBC # BLD AUTO: 2.55 MILLION/UL (ref 3.81–5.12)
WBC # BLD AUTO: 6.63 THOUSAND/UL (ref 4.31–10.16)

## 2023-09-08 PROCEDURE — 99495 TRANSJ CARE MGMT MOD F2F 14D: CPT | Performed by: FAMILY MEDICINE

## 2023-09-08 PROCEDURE — 88342 IMHCHEM/IMCYTCHM 1ST ANTB: CPT | Performed by: PATHOLOGY

## 2023-09-08 PROCEDURE — 88341 IMHCHEM/IMCYTCHM EA ADD ANTB: CPT | Performed by: PATHOLOGY

## 2023-09-08 PROCEDURE — 36415 COLL VENOUS BLD VENIPUNCTURE: CPT

## 2023-09-08 PROCEDURE — 88305 TISSUE EXAM BY PATHOLOGIST: CPT | Performed by: PATHOLOGY

## 2023-09-08 PROCEDURE — 85025 COMPLETE CBC W/AUTO DIFF WBC: CPT

## 2023-09-10 DIAGNOSIS — D62 ACUTE BLOOD LOSS ANEMIA: Primary | ICD-10-CM

## 2023-09-15 ENCOUNTER — OFFICE VISIT (OUTPATIENT)
Dept: GASTROENTEROLOGY | Facility: CLINIC | Age: 58
End: 2023-09-15
Payer: COMMERCIAL

## 2023-09-15 VITALS
OXYGEN SATURATION: 97 % | SYSTOLIC BLOOD PRESSURE: 136 MMHG | HEART RATE: 83 BPM | HEIGHT: 66 IN | BODY MASS INDEX: 24.27 KG/M2 | WEIGHT: 151 LBS | DIASTOLIC BLOOD PRESSURE: 84 MMHG

## 2023-09-15 DIAGNOSIS — K25.0 ACUTE GASTRIC ULCER WITH HEMORRHAGE: Primary | ICD-10-CM

## 2023-09-15 PROCEDURE — 99214 OFFICE O/P EST MOD 30 MIN: CPT | Performed by: INTERNAL MEDICINE

## 2023-09-15 RX ORDER — FERROUS SULFATE TAB EC 324 MG (65 MG FE EQUIVALENT) 324 (65 FE) MG
324 TABLET DELAYED RESPONSE ORAL
Qty: 31 TABLET | Refills: 3 | Status: SHIPPED | OUTPATIENT
Start: 2023-09-15

## 2023-09-15 NOTE — PROGRESS NOTES
[facility] - Outpatient Follow-up Note  Carrol Lay 66 y.o. female MRN: [id number]  Encounter: [id number]          ASSESSMENT AND PLAN:      1. Acute gastric ulcer with hemorrhage  - CBC (Includes Diff/Plt) (Refl); Future  - ferrous sulfate 324 (65 Fe) mg; Take 1 tablet (324 mg total) by mouth 2 (two) times a day before meals  Dispense: 31 tablet; Refill: 3  - EGD; Future this will be performed in 2 months    ______________________________________________________________________    SUBJECTIVE: Sharlene Peacock comes the office today for routine follow-up. She was recently hospitalized for an episode of hematemesis and melena. She was admitted in early September with the symptoms. She was taken to esophagogastroduodenoscopy with Dr. Abiola Purvis who discovered an ulcer in the antrum. There was no active bleeding and cauterization was not necessary. Biopsies were taken and Helicobacter pylori was ruled out. The patient was taking nonsteroidal anti-inflammatory medications for a couple of weeks prior to the onset of the ulcer. She denies any abdominal pain. Her hemoglobin level dropped as low as 6.9. On September 3 her hemoglobin level 7.7. On September 8 her hemoglobin level was 7.5. She denies any current problems with nausea or vomiting. This is her first diagnosed ulcer. REVIEW OF SYSTEMS IS OTHERWISE NEGATIVE.       Historical Information   Past Medical History:   Diagnosis Date   • Abnormal Pap smear of cervix    • Hearing loss    • Hyperlipemia    • Hypertension    • SIRS (systemic inflammatory response syndrome) ([address]) 9/1/2023   • Urinary tract infection      Past Surgical History:   Procedure Laterality Date   • APPENDECTOMY     • KNEE SURGERY       Social History   Social History     Substance and Sexual Activity   Alcohol Use Yes   • Alcohol/week: 2.0 standard drinks of alcohol   • Types: 2 Glasses of wine per week     Social History     Substance and Sexual Activity Drug Use No     Social History     Tobacco Use   Smoking Status Former   • Packs/day: 0.00   • Types: Cigarettes   • Start date: 1980   • Quit date: 10/12/1990   • Years since quittin.9   Smokeless Tobacco Never   Tobacco Comments    never a heavy user; social smoker     Family History   Problem Relation Age of Onset   • Heart disease Mother    • Diabetes Mother    • Kidney disease Mother    • Angina Mother    • Coronary artery disease Mother    • Hyperlipidemia Mother    • Dialysis Mother    • Stroke Mother    • Heart disease Father    • Diabetes Father    • Prostate cancer Father 76   • Coronary artery disease Father    • Cancer Father         bladder cancer   • Hyperlipidemia Father    • No Known Problems Sister    • No Known Problems Maternal Grandmother    • Heart disease Paternal Grandmother    • Coronary artery disease Brother    • Diabetes Brother    • Heart disease Brother    • Hyperlipidemia Brother    • No Known Problems Paternal Aunt    • No Known Problems Paternal Aunt    • No Known Problems Paternal Aunt    • Breast cancer Neg Hx    • Colon cancer Neg Hx    • Ovarian cancer Neg Hx    • Cervical cancer Neg Hx    • Uterine cancer Neg Hx        Meds/Allergies       Current Outpatient Medications:   •  estrogens, conjugated (Premarin) vaginal cream  •  ferrous sulfate 324 (65 Fe) mg  •  lisinopril (ZESTRIL) 10 mg tablet  •  pantoprazole (PROTONIX) 40 mg tablet  •  rosuvastatin (CRESTOR) 10 MG tablet    Allergies   Allergen Reactions   • Penicillins Rash           Objective     Blood pressure 136/84, pulse 83, height 5' 6" (1.676 m), weight 68.5 kg (151 lb), SpO2 97 %. Body mass index is 24.37 kg/m².       PHYSICAL EXAM:      General Appearance:   Alert, cooperative, no distress   HEENT:   Normocephalic, atraumatic, anicteric.     Neck:  Supple, symmetrical, trachea midline   Lungs:   Clear to auscultation bilaterally; no rales, rhonchi or wheezing; respirations unlabored    Heart[de-identified]   Regular rate and rhythm; no murmur, rub, or gallop. Abdomen:   Soft, non-tender, non-distended; normal bowel sounds; no masses, no organomegaly    Genitalia:   Deferred    Rectal:   Deferred    Extremities:  No cyanosis, clubbing or edema    Pulses:  2+ and symmetric    Skin:  No jaundice, rashes, or lesions    Lymph nodes:  No palpable cervical lymphadenopathy        Lab Results:   No visits with results within 1 Day(s) from this visit. Latest known visit with results is:   Appointment on 09/08/2023   Component Date Value   • WBC 09/08/2023 6.63    • RBC 09/08/2023 2.55 (L)    • Hemoglobin 09/08/2023 7.5 (L)    • Hematocrit 09/08/2023 24.9 (L)    • MCV 09/08/2023 98    • MCH 09/08/2023 29.4    • MCHC 09/08/2023 30.1 (L)    • RDW 09/08/2023 13.8    • MPV 09/08/2023 12.1    • Platelets 40/04/3739 433 (H)    • nRBC 09/08/2023 0    • Neutrophils Relative 09/08/2023 58    • Immat GRANS % 09/08/2023 1    • Lymphocytes Relative 09/08/2023 29    • Monocytes Relative 09/08/2023 8    • Eosinophils Relative 09/08/2023 3    • Basophils Relative 09/08/2023 1    • Neutrophils Absolute 09/08/2023 3.94    • Immature Grans Absolute 09/08/2023 0.03    • Lymphocytes Absolute 09/08/2023 1.92    • Monocytes Absolute 09/08/2023 0.50    • Eosinophils Absolute 09/08/2023 0.19    • Basophils Absolute 09/08/2023 0.05          Radiology Results:   EGD    Result Date: 9/2/2023  Narrative: Table formatting from the original result was not included. [address] [place] [address] Endoscopy [address] [place] [address] [phone] DATE OF SERVICE: 9/02/23 PHYSICIAN(S): Attending: Mirta Essex, MD Fellow: No Staff Documented INDICATION: GI bleed POST-OP DIAGNOSIS: See the impression below. PREPROCEDURE: Informed consent was obtained for the procedure, including sedation. Risks of perforation, hemorrhage, adverse drug reaction and aspiration were discussed. The patient was placed in the left lateral decubitus position.  Patient was explained about the risks and benefits of the procedure. Risks including but not limited to bleeding, infection, and perforation were explained in detail. Also explained about less than 100% sensitivity with the exam and other alternatives. PROCEDURE: EGD DETAILS OF PROCEDURE: Patient was taken to the procedure room where a time out was performed to confirm correct patient and correct procedure. The patient underwent monitored anesthesia care, which was administered by an anesthesia professional. The patient's blood pressure, heart rate, level of consciousness, respirations and oxygen were monitored throughout the procedure. The scope was advanced to the second part of the duodenum. Retroflexion was performed in the fundus. The patient experienced no blood loss. The procedure was not difficult. The patient tolerated the procedure well. There were no apparent adverse events. ANESTHESIA INFORMATION: ASA: II Anesthesia Type: IV Sedation with Anesthesia MEDICATIONS: No administrations occurring from 1117 to 1127 on 09/02/23 FINDINGS: The esophagus appeared normal. Cratered, round, benign-appearing ulcer in the prepyloric region with clean base (Sen III); performed cold forceps biopsy. An erythematous fold was present adjacent to the ulcer. The duodenum appeared normal. SPECIMENS: ID Type Source Tests Collected by Time Destination 1 : antrum Tissue Stomach TISSUE EXAM Chari Recio MD 9/2/2023 11:26 AM      Impression: The esophagus appeared normal. Ulcer in the prepyloric region with clean base (Sen III); performed cold forceps biopsy The duodenum appeared normal. RECOMMENDATION: Can covert to oral pantoprazole 40 mg twice daily x 8 weeks and then once daily thereafter Follow up biopsy results Follow up in the office Avoid alcohol and NSAIDS   Chari Recio MD     CT abdomen pelvis w contrast    Result Date: 9/2/2023  Narrative: CT ABDOMEN AND PELVIS WITH IV CONTRAST INDICATION:   Melena melena. COMPARISON:  None. TECHNIQUE:  CT examination of the abdomen and pelvis was performed. Multiplanar 2D reformatted images were created from the source data. This examination, like all CT scans performed in the [facility], was performed utilizing techniques to minimize radiation dose exposure, including the use of iterative reconstruction and automated exposure control. Radiation dose length product (DLP) for this visit:  635 mGy-cm IV Contrast:  100 mL of iohexol (OMNIPAQUE) Enteric Contrast:  Enteric contrast was not administered. FINDINGS: ABDOMEN LOWER CHEST: Bibasilar atelectasis. LIVER/BILIARY TREE:  Unremarkable. GALLBLADDER:  No calcified gallstones. No pericholecystic inflammatory change. SPLEEN:  Unremarkable. PANCREAS:  Unremarkable. ADRENAL GLANDS:  Unremarkable. KIDNEYS/URETERS:  Unremarkable. No hydronephrosis. STOMACH AND BOWEL: There is colonic diverticulosis without evidence of acute diverticulitis. Several tiny foci of hyperdensity in the ascending colon. Gastric antral thickening. APPENDIX:  No findings to suggest appendicitis. ABDOMINOPELVIC CAVITY:  No ascites. No pneumoperitoneum. No lymphadenopathy. VESSELS: Atherosclerotic changes are present. No evidence of aneurysm. PELVIS REPRODUCTIVE ORGANS: Fibroid uterus. URINARY BLADDER:  Unremarkable. ABDOMINAL WALL/INGUINAL REGIONS:  Unremarkable. OSSEOUS STRUCTURES:  No acute fracture or destructive osseous lesion. Impression: 1. Gastric antral thickening, which may represent gastritis versus gastric antral mass. Endoscopy is recommended for further evaluation. 2. Several punctate foci of hyperdensity in the ascending colon, which may be related to bismuth subsalicylate ingestion. However, given melena tiny foci of blood products cannot be excluded. Correlate with ingestion.  Workstation performed: [id number]     Answers for HPI/ROS submitted by the patient on 9/10/2023  Frequency: rarely  Pain - numeric: 0/10  belching: Yes  Diagnostic workup: CT scan, upper endoscopy
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
## 2023-10-04 ENCOUNTER — EVALUATION (OUTPATIENT)
Dept: PHYSICAL THERAPY | Age: 58
End: 2023-10-04
Payer: COMMERCIAL

## 2023-10-04 DIAGNOSIS — N39.3 SUI (STRESS URINARY INCONTINENCE, FEMALE): ICD-10-CM

## 2023-10-04 DIAGNOSIS — N95.2 VAGINAL ATROPHY: Primary | ICD-10-CM

## 2023-10-04 PROCEDURE — 97530 THERAPEUTIC ACTIVITIES: CPT | Performed by: PHYSICAL THERAPIST

## 2023-10-04 PROCEDURE — 97162 PT EVAL MOD COMPLEX 30 MIN: CPT | Performed by: PHYSICAL THERAPIST

## 2023-10-04 PROCEDURE — 97110 THERAPEUTIC EXERCISES: CPT | Performed by: PHYSICAL THERAPIST

## 2023-10-05 DIAGNOSIS — K25.3 ACUTE PREPYLORIC ULCER: ICD-10-CM

## 2023-10-05 RX ORDER — PANTOPRAZOLE SODIUM 40 MG/1
40 TABLET, DELAYED RELEASE ORAL
Qty: 60 TABLET | Refills: 0 | Status: SHIPPED | OUTPATIENT
Start: 2023-10-05 | End: 2023-11-04

## 2023-10-05 NOTE — TELEPHONE ENCOUNTER
Reason for call:   [x] Refill   [] Prior Auth  [] Other:     Office:   [] PCP/Provider -   [x] Speciality/Provider - GI, Dr Sheila Falcon    Medication:   Pantoprazole 40 mg, 1 bid, 60    Pharmacy: [facility], [place]    Does the patient have enough for 3 days?    [] Yes   [x] No - Send as HP to POD

## 2023-10-09 ENCOUNTER — TELEPHONE (OUTPATIENT)
Dept: GASTROENTEROLOGY | Facility: CLINIC | Age: 58
End: 2023-10-09

## 2023-10-09 ENCOUNTER — APPOINTMENT (OUTPATIENT)
Dept: LAB | Facility: HOSPITAL | Age: 58
End: 2023-10-09
Payer: COMMERCIAL

## 2023-10-09 DIAGNOSIS — D62 ACUTE BLOOD LOSS ANEMIA: ICD-10-CM

## 2023-10-09 DIAGNOSIS — K25.0 ACUTE GASTRIC ULCER WITH HEMORRHAGE: Primary | ICD-10-CM

## 2023-10-09 LAB
BASOPHILS # BLD AUTO: 0.06 THOUSANDS/ÂΜL (ref 0–0.1)
BASOPHILS NFR BLD AUTO: 1 % (ref 0–1)
EOSINOPHIL # BLD AUTO: 0.18 THOUSAND/ÂΜL (ref 0–0.61)
EOSINOPHIL NFR BLD AUTO: 3 % (ref 0–6)
ERYTHROCYTE [DISTWIDTH] IN BLOOD BY AUTOMATED COUNT: 14.1 % (ref 11.6–15.1)
HCT VFR BLD AUTO: 35.8 % (ref 34.8–46.1)
HGB BLD-MCNC: 10.8 G/DL (ref 11.5–15.4)
IMM GRANULOCYTES # BLD AUTO: 0.02 THOUSAND/UL (ref 0–0.2)
IMM GRANULOCYTES NFR BLD AUTO: 0 % (ref 0–2)
LYMPHOCYTES # BLD AUTO: 1.35 THOUSANDS/ÂΜL (ref 0.6–4.47)
LYMPHOCYTES NFR BLD AUTO: 24 % (ref 14–44)
MCH RBC QN AUTO: 27.6 PG (ref 26.8–34.3)
MCHC RBC AUTO-ENTMCNC: 30.2 G/DL (ref 31.4–37.4)
MCV RBC AUTO: 92 FL (ref 82–98)
MONOCYTES # BLD AUTO: 0.42 THOUSAND/ÂΜL (ref 0.17–1.22)
MONOCYTES NFR BLD AUTO: 7 % (ref 4–12)
NEUTROPHILS # BLD AUTO: 3.7 THOUSANDS/ÂΜL (ref 1.85–7.62)
NEUTS SEG NFR BLD AUTO: 65 % (ref 43–75)
NRBC BLD AUTO-RTO: 0 /100 WBCS
PLATELET # BLD AUTO: 358 THOUSANDS/UL (ref 149–390)
PMV BLD AUTO: 10.9 FL (ref 8.9–12.7)
RBC # BLD AUTO: 3.91 MILLION/UL (ref 3.81–5.12)
WBC # BLD AUTO: 5.73 THOUSAND/UL (ref 4.31–10.16)

## 2023-10-09 PROCEDURE — 36415 COLL VENOUS BLD VENIPUNCTURE: CPT

## 2023-10-09 PROCEDURE — 85025 COMPLETE CBC W/AUTO DIFF WBC: CPT

## 2023-10-09 NOTE — TELEPHONE ENCOUNTER
----- Message from Lawrence Guzman DO sent at 10/9/2023 11:43 AM EDT -----  Please call the patient with the CBC result. The CBC shows improvement. The hemoglobin level now is at 10.8. This is significantly better than the hemoglobin level 1 month ago of 7.5.

## 2023-10-09 NOTE — TELEPHONE ENCOUNTER
Called pt to give the CBC results. Pt wants to know if she should be continue taking Ion medication. Please advise.

## 2023-10-09 NOTE — TELEPHONE ENCOUNTER
Called Pt. To advise her to keep taking Ion medication but no answer/no VM left. Will call back later.

## 2023-10-19 DIAGNOSIS — I10 ESSENTIAL HYPERTENSION: ICD-10-CM

## 2023-10-19 DIAGNOSIS — E78.2 MIXED HYPERLIPIDEMIA: ICD-10-CM

## 2023-10-19 RX ORDER — ROSUVASTATIN CALCIUM 10 MG/1
10 TABLET, COATED ORAL DAILY
Qty: 90 TABLET | Refills: 0 | Status: SHIPPED | OUTPATIENT
Start: 2023-10-19

## 2023-10-19 RX ORDER — LISINOPRIL 10 MG/1
TABLET ORAL
Qty: 90 TABLET | Refills: 0 | Status: SHIPPED | OUTPATIENT
Start: 2023-10-19

## 2023-10-25 NOTE — PROGRESS NOTES
Daily Note     Today's date: 10/26/2023  Patient name: Amber Hickman  : 1965  MRN: [id number]  Referring provider: HAYLEY Rivero  Dx:   Encounter Diagnosis     ICD-10-CM    1. Vaginal atrophy  N95.2       2. ISA (stress urinary incontinence, female)  N39.3                      Subjective: Notes she has been using a liner versus a pad at nighttime without issue. Variable ISA depending on volume of fluid intake at times per patient. Objective: See treatment diary below      Assessment: Tolerated treatment well. Patient would benefit from continued PT. HEP instruction this date. Verbalized and demonstrated understanding. Written handouts provided. Good breath awareness. Challenge with PFM relaxation post activation reported. Plan: Continue per plan of care. Progress treatment as tolerated.        Precautions:HTN, SIRS    POC expires Unit limit Auth Expiration date PT/OT + Visit Limit?    no                             Visit/Unit Tracking  AUTH Status:  Date 10/4 10/26             AUTH 12 visits Used 1 2              Remaining  11 10              TINO                    Manuals 10/4 10/26                                     Ther Ex             CRK 3"/10"  Eval and verbal 3"/10"  10x HEP           LE stretch  10'  HEP           Quick Kegel  10x  1"           KNACK ed  2'                        Ball with Kegel  and exhale  3"/10x  HEP           TB ER with exhale  10x  HEP  BTB           TB ER with Kegel and exhale  10x  HEP  BTB           Ther Activity             Squatty potty ed PP            PFM education PP            Bladder health handout/ed PP                         Gait Training                                       Modalities

## 2023-10-26 ENCOUNTER — OFFICE VISIT (OUTPATIENT)
Dept: PHYSICAL THERAPY | Age: 58
End: 2023-10-26
Payer: COMMERCIAL

## 2023-10-26 DIAGNOSIS — N39.3 SUI (STRESS URINARY INCONTINENCE, FEMALE): ICD-10-CM

## 2023-10-26 DIAGNOSIS — N95.2 VAGINAL ATROPHY: Primary | ICD-10-CM

## 2023-10-26 PROCEDURE — 97110 THERAPEUTIC EXERCISES: CPT | Performed by: PHYSICAL THERAPIST

## 2023-11-05 DIAGNOSIS — K25.3 ACUTE PREPYLORIC ULCER: ICD-10-CM

## 2023-11-06 RX ORDER — PANTOPRAZOLE SODIUM 40 MG/1
40 TABLET, DELAYED RELEASE ORAL
Qty: 60 TABLET | Refills: 0 | Status: SHIPPED | OUTPATIENT
Start: 2023-11-06 | End: 2023-12-06

## 2023-11-09 ENCOUNTER — OFFICE VISIT (OUTPATIENT)
Dept: PHYSICAL THERAPY | Age: 58
End: 2023-11-09
Payer: COMMERCIAL

## 2023-11-09 DIAGNOSIS — N95.2 VAGINAL ATROPHY: Primary | ICD-10-CM

## 2023-11-09 DIAGNOSIS — N39.3 SUI (STRESS URINARY INCONTINENCE, FEMALE): ICD-10-CM

## 2023-11-09 PROCEDURE — 97140 MANUAL THERAPY 1/> REGIONS: CPT | Performed by: PHYSICAL THERAPIST

## 2023-11-09 PROCEDURE — 97110 THERAPEUTIC EXERCISES: CPT | Performed by: PHYSICAL THERAPIST

## 2023-11-09 NOTE — PROGRESS NOTES
Daily Note     Today's date: 2023  Patient name: Rylee Chong  : 1965  MRN: [id number]  Referring provider: HAYLEY Capellan  Dx:   Encounter Diagnosis     ICD-10-CM    1. Vaginal atrophy  N95.2       2. ISA (stress urinary incontinence, female)  N39.3           Start Time: 1001  Stop Time: 1058  Total time in clinic (min): 57 minutes    Subjective: Notes aware of some challenge relaxing PFM after contraction. Has started doing some other exercises in standing since starting these exercises. Notes not wearing pad at night. Notes what feels like occasional leakage when performing exercises. Objective: See treatment diary below      Assessment: Tolerated treatment well. Patient would benefit from continued PT Suggested reducing pad use while at home during the day as able. Encouraged Pfm relaxation. Consider biofeedback as needed. Plan: Continue per plan of care.       Precautions:HTN, SIRS    POC expires Unit limit Auth Expiration date PT/OT + Visit Limit?    no                             Visit/Unit Tracking  AUTH Status:  Date 10/4 10/26 11/9            AUTH 12 visits Used 1 2 3             Remaining  11 10              TINO                    Manuals 10/4 10/26 11/9          Pelvic transverse plane   PP                       Ther Ex             CRK 3"/10"  Eval and verbal 3"/10"  10x HEP 3"          LE stretch  10'  HEP 10'          Quick Kegel  10x  1"           KNACK ed  2'                        Ball with Kegel  and exhale  3"/10x  HEP 10x          TB ER with exhale  10x  HEP  BTB 10x          TB ER with Kegel and exhale  10x  HEP  BTB 10x          Segmental bridge   10x  HEP          clam   10x  HEP                       Ther Activity             Squatty potty ed PP            PFM education PP            Bladder health handout/ed PP                         Gait Training                                       Modalities

## 2023-11-19 DIAGNOSIS — N95.2 VAGINAL ATROPHY: ICD-10-CM

## 2023-11-20 RX ORDER — CONJUGATED ESTROGENS 0.62 MG/G
0.5 CREAM VAGINAL 2 TIMES WEEKLY
Qty: 30 G | Refills: 1 | Status: SHIPPED | OUTPATIENT
Start: 2023-11-20

## 2023-11-21 NOTE — PROGRESS NOTES
Daily Note     Today's date: 2023  Patient name: Rylee Chong  : 1965  MRN: [id number]  Referring provider: HAYLEY Capellan  Dx:   Encounter Diagnosis     ICD-10-CM    1. Vaginal atrophy  N95.2       2. ISA (stress urinary incontinence, female)  N39.3           Start Time: 6558  Stop Time: 0930  Total time in clinic (min): 57 minutes    Subjective: Patient notes when home she has been going without wearing a pad or liner for some time with success. Overall reduced leakage in frequency and amount. Notes a bit inconsistent this past two weeks with exercises but performing as able. Objective: See treatment diary below      Assessment: Tolerated treatment well. Patient would benefit from continued PT Good breath awareness. Improved motor control with segmental bridge. Reduced urinary symptoms overall. Doing well. Added sit to stand to HEP. Plan: Continue per plan of care.       Precautions:HTN, SIRS    POC expires Unit limit Auth Expiration date PT/OT + Visit Limit?    no                             Visit/Unit Tracking  AUTH Status:  Date 10/4 10/26 11/9 11/  22           AUTH 12 visits Used 1 2 3 4            Remaining  11 10  8            TINO                    Manuals 10/4 10/26 11/9 11/22         Pelvic transverse plane   PP                       Ther Ex             CRK 3"/10"  Eval and verbal 3"/10"  10x HEP 3" 3"/10x         LE stretch  10'  HEP 10' 10'         Quick Kegel  10x  1"  10x         KNACK ed  2'                        Ball with Kegel  and exhale  3"/10x  HEP 10x 2x10         TB ER with exhale  10x  HEP  BTB 10x 10x         TB ER with Kegel and exhale  10x  HEP  BTB 10x 10x         Segmental bridge   10x  HEP 10x         clam   10x   10x  HEP         Sit to stand Kegel    10x         Ther Activity             Squatty potty ed PP            PFM education PP            Bladder health handout/ed PP                         Gait Training Modalities

## 2023-11-22 ENCOUNTER — OFFICE VISIT (OUTPATIENT)
Dept: PHYSICAL THERAPY | Age: 58
End: 2023-11-22
Payer: COMMERCIAL

## 2023-11-22 DIAGNOSIS — N95.2 VAGINAL ATROPHY: Primary | ICD-10-CM

## 2023-11-22 DIAGNOSIS — N39.3 SUI (STRESS URINARY INCONTINENCE, FEMALE): ICD-10-CM

## 2023-11-22 PROCEDURE — 97110 THERAPEUTIC EXERCISES: CPT | Performed by: PHYSICAL THERAPIST

## 2023-12-01 ENCOUNTER — ANESTHESIA (OUTPATIENT)
Dept: GASTROENTEROLOGY | Facility: HOSPITAL | Age: 58
End: 2023-12-01

## 2023-12-01 ENCOUNTER — ANESTHESIA EVENT (OUTPATIENT)
Dept: GASTROENTEROLOGY | Facility: HOSPITAL | Age: 58
End: 2023-12-01

## 2023-12-01 ENCOUNTER — HOSPITAL ENCOUNTER (OUTPATIENT)
Dept: GASTROENTEROLOGY | Facility: HOSPITAL | Age: 58
Setting detail: OUTPATIENT SURGERY
End: 2023-12-01
Attending: INTERNAL MEDICINE
Payer: COMMERCIAL

## 2023-12-01 VITALS
BODY MASS INDEX: 29.91 KG/M2 | OXYGEN SATURATION: 100 % | HEART RATE: 70 BPM | WEIGHT: 152.34 LBS | SYSTOLIC BLOOD PRESSURE: 135 MMHG | RESPIRATION RATE: 16 BRPM | HEIGHT: 60 IN | DIASTOLIC BLOOD PRESSURE: 91 MMHG | TEMPERATURE: 97.6 F

## 2023-12-01 DIAGNOSIS — K25.0 ACUTE GASTRIC ULCER WITH HEMORRHAGE: ICD-10-CM

## 2023-12-01 PROCEDURE — 88305 TISSUE EXAM BY PATHOLOGIST: CPT | Performed by: PATHOLOGY

## 2023-12-01 PROCEDURE — 43239 EGD BIOPSY SINGLE/MULTIPLE: CPT | Performed by: INTERNAL MEDICINE

## 2023-12-01 PROCEDURE — 88342 IMHCHEM/IMCYTCHM 1ST ANTB: CPT | Performed by: PATHOLOGY

## 2023-12-01 RX ORDER — PROPOFOL 10 MG/ML
INJECTION, EMULSION INTRAVENOUS AS NEEDED
Status: DISCONTINUED | OUTPATIENT
Start: 2023-12-01 | End: 2023-12-01

## 2023-12-01 RX ORDER — SODIUM CHLORIDE, SODIUM LACTATE, POTASSIUM CHLORIDE, CALCIUM CHLORIDE 600; 310; 30; 20 MG/100ML; MG/100ML; MG/100ML; MG/100ML
INJECTION, SOLUTION INTRAVENOUS CONTINUOUS PRN
Status: DISCONTINUED | OUTPATIENT
Start: 2023-12-01 | End: 2023-12-01

## 2023-12-01 RX ORDER — LIDOCAINE HYDROCHLORIDE 20 MG/ML
INJECTION, SOLUTION EPIDURAL; INFILTRATION; INTRACAUDAL; PERINEURAL AS NEEDED
Status: DISCONTINUED | OUTPATIENT
Start: 2023-12-01 | End: 2023-12-01

## 2023-12-01 RX ORDER — ONDANSETRON 2 MG/ML
4 INJECTION INTRAMUSCULAR; INTRAVENOUS ONCE AS NEEDED
Status: CANCELLED | OUTPATIENT
Start: 2023-12-01

## 2023-12-01 RX ADMIN — PROPOFOL 50 MG: 10 INJECTION, EMULSION INTRAVENOUS at 08:11

## 2023-12-01 RX ADMIN — SODIUM CHLORIDE, SODIUM LACTATE, POTASSIUM CHLORIDE, AND CALCIUM CHLORIDE: .6; .31; .03; .02 INJECTION, SOLUTION INTRAVENOUS at 08:02

## 2023-12-01 RX ADMIN — PROPOFOL 30 MG: 10 INJECTION, EMULSION INTRAVENOUS at 08:13

## 2023-12-01 RX ADMIN — PROPOFOL 30 MG: 10 INJECTION, EMULSION INTRAVENOUS at 08:14

## 2023-12-01 RX ADMIN — LIDOCAINE HYDROCHLORIDE 100 MG: 20 INJECTION, SOLUTION EPIDURAL; INFILTRATION; INTRACAUDAL; PERINEURAL at 08:09

## 2023-12-01 RX ADMIN — SODIUM CHLORIDE, SODIUM LACTATE, POTASSIUM CHLORIDE, AND CALCIUM CHLORIDE: .6; .31; .03; .02 INJECTION, SOLUTION INTRAVENOUS at 08:07

## 2023-12-01 RX ADMIN — PROPOFOL 130 MG: 10 INJECTION, EMULSION INTRAVENOUS at 08:09

## 2023-12-01 NOTE — H&P
History and Physical - [facility]  Charlotte Bautista 66 y.o. female MRN: [id number]      HPI: Charlotte Bautista is a 66y.o. year old female who presents for evaluation of a previous peptic ulcer      REVIEW OF SYSTEMS: Per the HPI, and otherwise unremarkable.     Historical Information   Past Medical History:   Diagnosis Date    Abnormal Pap smear of cervix     Hearing loss     Hyperlipemia     Hypertension     SIRS (systemic inflammatory response syndrome) ([facility]) 2023    Urinary tract infection      Past Surgical History:   Procedure Laterality Date    APPENDECTOMY      KNEE SURGERY       Social History   Social History     Substance and Sexual Activity   Alcohol Use Yes    Alcohol/week: 2.0 standard drinks of alcohol    Types: 2 Glasses of wine per week     Social History     Substance and Sexual Activity   Drug Use No     Social History     Tobacco Use   Smoking Status Former    Packs/day: 0.00    Types: Cigarettes    Start date: 1980    Quit date: 10/12/1990    Years since quittin.1   Smokeless Tobacco Never   Tobacco Comments    never a heavy user; social smoker     Family History   Problem Relation Age of Onset    Heart disease Mother     Diabetes Mother     Kidney disease Mother     Angina Mother     Coronary artery disease Mother     Hyperlipidemia Mother     Dialysis Mother     Stroke Mother     Heart disease Father     Diabetes Father     Prostate cancer Father 76    Coronary artery disease Father     Cancer Father         bladder cancer    Hyperlipidemia Father     No Known Problems Sister     No Known Problems Maternal Grandmother     Heart disease Paternal Grandmother     Coronary artery disease Brother     Diabetes Brother     Heart disease Brother     Hyperlipidemia Brother     No Known Problems Paternal Aunt     No Known Problems Paternal Aunt     No Known Problems Paternal Aunt     Breast cancer Neg Hx     Colon cancer Neg Hx     Ovarian cancer Neg Hx     Cervical cancer Neg Hx     Uterine cancer Neg Hx        Meds/Allergies     (Not in a hospital admission)      Allergies   Allergen Reactions    Penicillins Rash       Objective     Blood pressure 144/90, pulse 84, temperature (!) 97.1 °F (36.2 °C), temperature source Temporal, resp. rate 16, height 5' (1.524 m), weight 69.1 kg (152 lb 5.4 oz), SpO2 100 %. PHYSICAL EXAM    Gen: NAD  CV: RRR  CHEST: Clear  ABD: soft, NT/ND  EXT: no edema      ASSESSMENT/PLAN:  This is a 66y.o. year old female here for EGD with biopsies, and she is stable and optimized for her procedure.

## 2023-12-01 NOTE — ANESTHESIA POSTPROCEDURE EVALUATION
Post-Op Assessment Note    CV Status:  Stable  Pain Score: 0    Pain management: adequate       Mental Status:  Alert and awake   Hydration Status:  Euvolemic   PONV Controlled:  Controlled   Airway Patency:  Patent     Post Op Vitals Reviewed: Yes    No anethesia notable event occurred.     Staff: CRNA               BP   119/67   Temp  36   Pulse  69   Resp   14   SpO2   96

## 2023-12-01 NOTE — ANESTHESIA PREPROCEDURE EVALUATION
Procedure:  EGD    Relevant Problems   CARDIO   (+) Mixed hyperlipidemia   (+) Primary hypertension      GI/HEPATIC   (+) Acute prepyloric ulcer      HEMATOLOGY   (+) Acute blood loss anemia      Digestive   (+) Melena        Physical Exam    Airway    Mallampati score: III  TM Distance: <3 FB  Neck ROM: full     Dental   No notable dental hx     Cardiovascular      Pulmonary      Other Findings  post-pubertal.      Anesthesia Plan  ASA Score- 2     Anesthesia Type- IV sedation with anesthesia with ASA Monitors. Additional Monitors:     Airway Plan:            Plan Factors-Exercise tolerance (METS): >4 METS. Chart reviewed. Existing labs reviewed. Patient summary reviewed. Patient is not a current smoker. Induction- intravenous. Postoperative Plan-     Informed Consent- Anesthetic plan and risks discussed with patient. I personally reviewed this patient with the CRNA. Discussed and agreed on the Anesthesia Plan with the CRNA. Felicitas Mccauley             VITALS  /90   Pulse 84   Temp (!) 97.1 °F (36.2 °C) (Temporal)   Resp 16   Ht 5' (1.524 m)   Wt 69.1 kg (152 lb 5.4 oz)   LMP  (LMP Unknown)   SpO2 100%   BMI 29.75 kg/m²   BP Readings from Last 3 Encounters:   12/01/23 144/90   09/15/23 136/84   09/08/23 138/94     LABS  Results from Last 12 Months   Lab Units 09/03/23  0444 09/02/23  0910 09/01/23  1640   SODIUM mmol/L 138 137 138   POTASSIUM mmol/L 3.6 3.5 3.3*   CHLORIDE mmol/L 108 108 102   CO2 mmol/L 23 21 21   ANION GAP mmol/L 7 8 15   BUN mg/dL 7 15 32*   CREATININE mg/dL 0.59* 0.68 0.82   CALCIUM mg/dL 8.4 8.3* 9.6   GLUCOSE RANDOM mg/dL 97 100 140   MAGNESIUM mg/dL 2.1  --   --    AST U/L  --   --  12*   ALT U/L  --   --  12   ALK PHOS U/L  --   --  55   TOTAL BILIRUBIN mg/dL  --   --  0.46   ALBUMIN g/dL  --   --  4.7     Results from Last 12 Months   Lab Units 10/09/23  1044 09/08/23  1143   WBC Thousand/uL 5.73 6.63   HEMOGLOBIN g/dL 10.8* 7.5*   HEMATOCRIT % 35.8 24.9* PLATELETS Thousands/uL 358 433*     No results for input(s): "APTT", "INR", "PTT" in the last 8784 hours. ECG  n/a  No results found for this or any previous visit (from the past 4464 hour(s)). No results found for this or any previous visit. ECHOCARDIOGRAPHY AND OTHER TESTING/IMAGING  n/a    ANESTHESIA RISK-BENEFIT DISCUSSION  BENEFITS INCLUDE THE FOLLOWING ([address] [id number], PMID [id number]): (1) Involvement of a dedicated anesthesia team reduces mortality and morbidity for major surgeries, (2) The team provides as much analgesia/sedation/amnesia/akinesia as is reasonably possible, and (3) The team strives to reduce discomfort and distress as much as reasonably achievable. RISKS (AND PLANS TO MITIGATE RISKS) INCLUDES THE FOLLOWING:    Neurologic Assessment: IntraOp awareness (Risk is ~1:1,000 - 1:14,000; PMID 69881156), Stroke (Risk ~<0.1-2% for most cases; PMID [id number]), nerve injury, vision loss, and POCD. Airway and Pulmonary Assessment: Dental or mouth injury, throat pain, critical hypoxia, pneumothorax, prolonged intubation, post-op respiratory compromise. Airway/Intubation risks and information: No prior advanced airway notes in [facility] EMR  Major ARISCAT risk factors include: none, yielding a score 0-1= Low risk, 1.6%. Cardiovascular Assessment: Hypotension, arrhythmias, and josemanuel-op cardiac injury (MACE). In cases where specialized vascular access is needed, then additional risks including bleeding, infection, or injury to adjacent structures. If a bypass circuit is needed then risk of stroke, blood clot, and vasoplegia. Signs of active, severe cardiac instability: none  Alo's RCRI score items: none, yielding an RCRI Score of 0= 0.4% risk of MACE  Are josemanuel-op or intra-op beta blockers indicated? (PMID [id number]): no  FEN/GI Assessment: Aspiration (Approximately 0.5% risk per the IRIS trial) and PONV (10-80% depending on Apfel criteria).   Does the patient meet ASA NPO guidelines?: Yes  Adverse Drug Risk Assessment: Allergic reactions, overdoses, drug-drug interactions, injury to a fetus or  in pregnant or breastfeeding patients, increased risk of injury or accident if performing potentially hazardous tasks before anesthetic medications have been fully excreted/metabolized.   Recent medications relevant to anesthetic plan: See MAR or Med Review  Personal or family history of anesthesia complications: no  Pregnancy Status: Not applicable  Mortality risks associated with anesthesia based on ASA-PS (PMID 81860991): ASA-PS II: Estimated risk  1:20,000

## 2023-12-04 ENCOUNTER — TELEPHONE (OUTPATIENT)
Age: 58
End: 2023-12-04

## 2023-12-04 NOTE — TELEPHONE ENCOUNTER
Patients GI provider:  Dr. Jud Shaver    Number to return call: [phone]    Reason for call: Pt calling to update her insurance information. I updated policy number.     Scheduled procedure/appointment date if applicable: Apt/procedure N/A

## 2023-12-05 NOTE — PROGRESS NOTES
Daily Note     Today's date: 2023  Patient name: Reyes Sotomayor  : 1965  MRN: [id number]  Referring provider: HAYLEY Jimenez  Dx:   Encounter Diagnosis     ICD-10-CM    1. Vaginal atrophy  N95.2       2. ISA (stress urinary incontinence, female)  N39.3           Start Time: 5165  Stop Time: 0930  Total time in clinic (min): 57 minutes    Subjective: Notes had GI  scope with resting afterward. Notes doing better pelvic floor wise and doing exercises every other day. Notes during the day on the  weekend going without liners. Notes mostly leakage at the end of a work day after prolonged sitting and higher fluid intake or with fluid intake and traveling by car. Notes less perineal irritation with reduced liner usage and reduced leakage. Objective: See treatment diary below      Assessment: Tolerated treatment well. Patient would benefit from continued PT Bridge challenging with improved motor control noted. Increased exercise for HEP with good tolerance. Doing better with reduced urinary symptoms. Plan: Continue per plan of care. Potential discharge next visit.      Precautions:HTN, SIRS    POC expires Unit limit Auth Expiration date PT/OT + Visit Limit?    no                             Visit/Unit Tracking  AUTH Status:  Date 10/4 10/26 11/9 11/  22 12/6          AUTH 12 visits Used 1 2 3 4 5           Remaining  11 10  8 7           TINO                    Manuals 10/4 10/26 11/9 11/22 12/6        Pelvic transverse plane   PP                       Ther Ex             CRK 3"/10"  Eval and verbal 3"/10"  10x HEP 3" 3"/10x 4"/10x        LE stretch  10'  HEP 10' 10' 10'        Quick Kegel  10x  1"  10x 2x10        KNACK ed  2'                        Ball with Kegel  and exhale  3"/10x  HEP 10x 2x10 2x10        TB ER with exhale  10x  HEP  BTB 10x 10x         TB ER with Kegel and exhale  10x  HEP  BTB 10x 10x 2x10        Segmental bridge   10x  HEP 10x 10x        clam   10x 10x  HEP 10x        Sit to stand Kegel    10x 10x  HEP        TB row     BTB  10x  HEP        TB ext     TB 10x  HEP          TB multi press     5"/10x  HEP  BTB                     Ther Activity             Squatty potty ed PP            PFM education PP            Bladder health handout/ed PP                         Gait Training                                       Modalities

## 2023-12-06 ENCOUNTER — OFFICE VISIT (OUTPATIENT)
Dept: PHYSICAL THERAPY | Age: 58
End: 2023-12-06
Payer: COMMERCIAL

## 2023-12-06 DIAGNOSIS — N95.2 VAGINAL ATROPHY: Primary | ICD-10-CM

## 2023-12-06 DIAGNOSIS — N39.3 SUI (STRESS URINARY INCONTINENCE, FEMALE): ICD-10-CM

## 2023-12-06 PROCEDURE — 97110 THERAPEUTIC EXERCISES: CPT | Performed by: PHYSICAL THERAPIST

## 2023-12-08 PROCEDURE — 88342 IMHCHEM/IMCYTCHM 1ST ANTB: CPT | Performed by: PATHOLOGY

## 2023-12-08 PROCEDURE — 88305 TISSUE EXAM BY PATHOLOGIST: CPT | Performed by: PATHOLOGY

## 2023-12-12 ENCOUNTER — TELEPHONE (OUTPATIENT)
Dept: GASTROENTEROLOGY | Facility: CLINIC | Age: 58
End: 2023-12-12

## 2023-12-12 NOTE — TELEPHONE ENCOUNTER
----- Message from Rosalva Cervantes DO sent at 12/12/2023  6:44 AM EST -----  Results were given the patient's MyChart. Nimisha,     The biopsies that were taken from the stomach were benign showing no evidence of the bacteria called Helicobacter pylori and no evidence of cancer.

## 2023-12-20 ENCOUNTER — APPOINTMENT (OUTPATIENT)
Dept: PHYSICAL THERAPY | Age: 58
End: 2023-12-20
Payer: COMMERCIAL

## 2023-12-21 ENCOUNTER — APPOINTMENT (OUTPATIENT)
Dept: PHYSICAL THERAPY | Age: 58
End: 2023-12-21
Payer: COMMERCIAL

## 2023-12-22 ENCOUNTER — OFFICE VISIT (OUTPATIENT)
Dept: PHYSICAL THERAPY | Age: 58
End: 2023-12-22
Payer: COMMERCIAL

## 2023-12-22 DIAGNOSIS — N39.3 SUI (STRESS URINARY INCONTINENCE, FEMALE): ICD-10-CM

## 2023-12-22 DIAGNOSIS — N95.2 VAGINAL ATROPHY: Primary | ICD-10-CM

## 2023-12-22 PROCEDURE — 97110 THERAPEUTIC EXERCISES: CPT | Performed by: PHYSICAL THERAPIST

## 2023-12-22 NOTE — PROGRESS NOTES
"Daily Note     Today's date: 2023  Patient name: Argenis Cadean  : 1965  MRN: [id number]  Referring provider: Laura East CRNP  Dx:   Encounter Diagnosis     ICD-10-CM    1. Vaginal atrophy  N95.2       2. ISA (stress urinary incontinence, female)  N39.3                      Subjective: Patient offers no new complaints. Variable irritation in perineal area which she attributes some to estrogen cream use. Reduced need for bladder protective pads. Right LBP with travel this week.      Objective: See treatment diary below      Assessment: Tolerated treatment well. Patient exhibited good technique with therapeutic exercises Patient independent in ongoing HEP. Reviewed today. Improvement in bladder control since PT onset. At this time, patient has achieved their maximum functional benefit from skilled physical therapy services and will be discharged to their HEP.  Patient is in agreement with the plan of care.  As a result, patient is discharged from physical therapy.      Plan: Discharge PT to self care/HEP.     Precautions:HTN, SIRS    POC expires Unit limit Auth Expiration date PT/OT + Visit Limit?    no                             Visit/Unit Tracking  AUTH Status:  Date 10/4 10/26 11/9 11/  22 12/6 12/22         AUTH 12 visits Used 1 2 3 4 5 6          Remaining  11 10  8 7 6          TINO                    Manuals 10/4 10/26 11/9 11/22 12/6 12/22       Pelvic transverse plane   PP                       Ther Ex             CRK 3\"/10\"  Eval and verbal 3\"/10\"  10x HEP 3\" 3\"/10x 4\"/10x 4\"/10\"/10x       LE stretch  10'  HEP 10' 10' 10' 10'       Quick Kegel  10x  1\"  10x 2x10 10x2       KNACK ed  2'                        Ball with Kegel  and exhale  3\"/10x  HEP 10x 2x10 2x10 10x2       TB ER with exhale  10x  HEP  BTB 10x 10x         TB ER with Kegel and exhale  10x  HEP  BTB 10x 10x 2x10 2x10       Segmental bridge   10x  HEP 10x 10x 10x       clam   10x   10x  HEP 10x 10x       Sit to " "stand Kegel    10x 10x  HEP 10x       TB row     BTB  10x  HEP 10x       TB ext     TB 10x  HEP   10x       TB multi press     5\"/10x  HEP  BTB 10x                    Ther Activity             Squatty potty ed PP            PFM education PP            Bladder health handout/ed PP                         Gait Training                                       Modalities                                                    "

## 2024-01-03 ENCOUNTER — OFFICE VISIT (OUTPATIENT)
Dept: FAMILY MEDICINE CLINIC | Facility: CLINIC | Age: 59
End: 2024-01-03
Payer: COMMERCIAL

## 2024-01-03 ENCOUNTER — APPOINTMENT (OUTPATIENT)
Dept: RADIOLOGY | Facility: CLINIC | Age: 59
End: 2024-01-03
Payer: COMMERCIAL

## 2024-01-03 VITALS
BODY MASS INDEX: 30 KG/M2 | SYSTOLIC BLOOD PRESSURE: 132 MMHG | WEIGHT: 153.6 LBS | HEART RATE: 92 BPM | TEMPERATURE: 98.5 F | DIASTOLIC BLOOD PRESSURE: 82 MMHG | OXYGEN SATURATION: 97 %

## 2024-01-03 DIAGNOSIS — R05.2 SUBACUTE COUGH: ICD-10-CM

## 2024-01-03 DIAGNOSIS — J40 BRONCHITIS: Primary | ICD-10-CM

## 2024-01-03 PROCEDURE — 99213 OFFICE O/P EST LOW 20 MIN: CPT

## 2024-01-03 PROCEDURE — 71046 X-RAY EXAM CHEST 2 VIEWS: CPT

## 2024-01-03 RX ORDER — PREDNISONE 20 MG/1
20 TABLET ORAL DAILY
Qty: 5 TABLET | Refills: 0 | Status: SHIPPED | OUTPATIENT
Start: 2024-01-03

## 2024-01-03 RX ORDER — DEXTROMETHORPHAN HYDROBROMIDE AND PROMETHAZINE HYDROCHLORIDE 15; 6.25 MG/5ML; MG/5ML
5 SYRUP ORAL 4 TIMES DAILY PRN
Qty: 120 ML | Refills: 0 | Status: SHIPPED | OUTPATIENT
Start: 2024-01-03

## 2024-01-03 NOTE — PROGRESS NOTES
"Name: Argenis Cadena      : 1965      MRN: [id number]  Encounter Provider: Felecia Lopes PA-C  Encounter Date: 1/3/2024   Encounter department: [facility]    Assessment & Plan     1. Bronchitis  -     predniSONE 20 mg tablet; Take 1 tablet (20 mg total) by mouth daily  -     promethazine-dextromethorphan (PHENERGAN-DM) 6.25-15 mg/5 mL oral syrup; Take 5 mL by mouth 4 (four) times a day as needed for cough    2. Subacute cough  -     XR chest pa & lateral; Future; Expected date: 2024    Patient presents for evaluation of productive cough + mild pnd x 2 weeks. On exam, lungs clear to auscultation bilaterally with an O2 of 97% on room air. However due to timeline of symptoms ordering CXR to rule out pneumonia. Also giving prednisone for symptoms as patient feels mucus is \"stuck\" in chest - educated on potential side effects of medication. Gave promethazine for cough. Otherwise advised to continue supportive care and to stay hydrated. To call if symptoms worsen or persist. Advised ER if symptoms worsen, or she experience chest pain, shortness of breath, trouble breathing.        Subjective      CC: productive + congestion x 2 weeks     Patient presents for evaluation of a productive cough + slight congestion/pnd x 2 weeks. Notes she has been coughing up yellow sputum. She has been taking Robutussin for the cough which has not been helping much. Cough has not improved much at all in two weeks.       Review of Systems   Constitutional:  Negative for appetite change, chills, diaphoresis, fatigue and fever.   HENT:  Positive for congestion and postnasal drip. Negative for ear pain, rhinorrhea, sinus pressure, sinus pain and sore throat.    Respiratory:  Positive for cough and chest tightness. Negative for shortness of breath and wheezing.    Cardiovascular:  Negative for chest pain and palpitations.   Gastrointestinal:  Negative for abdominal pain, diarrhea, nausea and vomiting. "   Neurological:  Negative for dizziness, light-headedness and headaches.       Current Outpatient Medications on File Prior to Visit   Medication Sig    estrogens, conjugated (Premarin) vaginal cream Insert 0.5 g into the vagina 2 (two) times a week    lisinopril (ZESTRIL) 10 mg tablet TAKE ONE TABLET BY MOUTH EVERY DAY    rosuvastatin (CRESTOR) 10 MG tablet TAKE ONE TABLET BY MOUTH EVERY DAY    ferrous sulfate 324 (65 Fe) mg Take 1 tablet (324 mg total) by mouth 2 (two) times a day before meals    pantoprazole (PROTONIX) 40 mg tablet Take 1 tablet (40 mg total) by mouth 2 (two) times a day before meals       Objective     /82   Pulse 92   Temp 98.5 °F (36.9 °C)   Wt 69.7 kg (153 lb 9.6 oz)   LMP  (LMP Unknown)   SpO2 97%   BMI 30.00 kg/m²     Physical Exam  Vitals reviewed.   Constitutional:       General: She is not in acute distress.     Appearance: Normal appearance. She is not ill-appearing or diaphoretic.   HENT:      Head: Normocephalic and atraumatic.      Right Ear: Tympanic membrane, ear canal and external ear normal. There is no impacted cerumen.      Left Ear: Tympanic membrane, ear canal and external ear normal. There is no impacted cerumen.      Nose: Nose normal. No congestion or rhinorrhea.      Mouth/Throat:      Mouth: Mucous membranes are moist.      Pharynx: Oropharynx is clear. No oropharyngeal exudate or posterior oropharyngeal erythema.   Eyes:      General:         Right eye: No discharge.         Left eye: No discharge.      Conjunctiva/sclera: Conjunctivae normal.   Cardiovascular:      Rate and Rhythm: Normal rate and regular rhythm.      Pulses: Normal pulses.      Heart sounds: Normal heart sounds. No murmur heard.  Pulmonary:      Effort: Pulmonary effort is normal. No respiratory distress.      Breath sounds: Normal breath sounds. No wheezing, rhonchi or rales.   Musculoskeletal:         General: Normal range of motion.      Cervical back: Normal range of motion and neck  supple.   Lymphadenopathy:      Cervical: No cervical adenopathy.   Skin:     General: Skin is warm.   Neurological:      General: No focal deficit present.      Mental Status: She is alert.      Gait: Gait normal.   Psychiatric:         Mood and Affect: Mood normal.       eFlecia Lopes PA-C

## 2024-01-16 DIAGNOSIS — E78.2 MIXED HYPERLIPIDEMIA: ICD-10-CM

## 2024-01-16 DIAGNOSIS — I10 ESSENTIAL HYPERTENSION: ICD-10-CM

## 2024-01-17 RX ORDER — LISINOPRIL 10 MG/1
TABLET ORAL
Qty: 90 TABLET | Refills: 1 | Status: SHIPPED | OUTPATIENT
Start: 2024-01-17

## 2024-01-17 RX ORDER — ROSUVASTATIN CALCIUM 10 MG/1
10 TABLET, COATED ORAL DAILY
Qty: 90 TABLET | Refills: 1 | Status: SHIPPED | OUTPATIENT
Start: 2024-01-17

## 2024-01-29 NOTE — PROGRESS NOTES
"Argenis Cadena 1965 female MRN: [id number]      ASSESSMENT/PLAN  Problem List Items Addressed This Visit    None  Visit Diagnoses     Fluid level behind tympanic membrane of both ears    -  Primary        No cerumen impaction or AOM. Pt does have middle ear effusions B/L (L>R). Encouraged trial of Flonase (reviewed proper technique) in addition to decongestant. If symptoms persist, would benefit from ENT evaluation.         Future Appointments   Date Time Provider Department Center   4/4/2024  8:30 AM [facility]   6/11/2024  1:40 PM MD [facility]   6/18/2024  2:00 PM Laura Cruzito, CRNP [facility]          SUBJECTIVE  CC: Ears clogged (X 2 weeks, difficulty hearing) and Sinus pressure      HPI:  Argenis Cadena is a 58 y.o. female who presents due to ear issue.     2 week history of:   \"I really can't hear\" -- ears are clogged   Was having some sinus congestion/rhinorrhea, post-nasal drip   Was using Claritin D, which helped with sinus congestion, but still having some rhinorrhea     Of note, saw Felecia on 1/3 due to bronchitis -- was given Prednisone, this resolved     Review of Systems   HENT:  Positive for hearing loss and rhinorrhea. Negative for congestion, postnasal drip (has resolved) and sore throat (has resolved). Ear pain: (+) clogged.       Historical Information   The patient history was reviewed and updated as follows:    Past Medical History:   Diagnosis Date   • Abnormal Pap smear of cervix    • Hearing loss    • Hyperlipemia    • Hypertension    • SIRS (systemic inflammatory response syndrome) ([facility]) 9/1/2023   • Urinary tract infection      Past Surgical History:   Procedure Laterality Date   • APPENDECTOMY     • KNEE SURGERY       Family History   Problem Relation Age of Onset   • Heart disease Mother    • Diabetes Mother    • Kidney disease Mother    • Angina Mother    • Coronary artery disease Mother    • " Hyperlipidemia Mother    • Dialysis Mother    • Stroke Mother    • Heart disease Father    • Diabetes Father    • Prostate cancer Father 68   • Coronary artery disease Father    • Cancer Father         bladder cancer   • Hyperlipidemia Father    • No Known Problems Sister    • No Known Problems Maternal Grandmother    • Heart disease Paternal Grandmother    • Coronary artery disease Brother    • Diabetes Brother    • Heart disease Brother    • Hyperlipidemia Brother    • No Known Problems Paternal Aunt    • No Known Problems Paternal Aunt    • No Known Problems Paternal Aunt    • Breast cancer Neg Hx    • Colon cancer Neg Hx    • Ovarian cancer Neg Hx    • Cervical cancer Neg Hx    • Uterine cancer Neg Hx       Social History   Social History     Substance and Sexual Activity   Alcohol Use Yes   • Alcohol/week: 2.0 standard drinks of alcohol   • Types: 2 Glasses of wine per week     Social History     Substance and Sexual Activity   Drug Use No     Social History     Tobacco Use   Smoking Status Former   • Current packs/day: 0.00   • Types: Cigarettes   • Quit date: 1980   • Years since quittin.6   Smokeless Tobacco Never   Tobacco Comments    never a heavy user; social smoker       Medications:     Current Outpatient Medications:   •  estrogens, conjugated (Premarin) vaginal cream, Insert 0.5 g into the vagina 2 (two) times a week, Disp: 30 g, Rfl: 1  •  lisinopril (ZESTRIL) 10 mg tablet, TAKE ONE TABLET BY MOUTH EVERY DAY, Disp: 90 tablet, Rfl: 1  •  predniSONE 20 mg tablet, Take 1 tablet (20 mg total) by mouth daily, Disp: 5 tablet, Rfl: 0  •  promethazine-dextromethorphan (PHENERGAN-DM) 6.25-15 mg/5 mL oral syrup, Take 5 mL by mouth 4 (four) times a day as needed for cough, Disp: 120 mL, Rfl: 0  •  rosuvastatin (CRESTOR) 10 MG tablet, TAKE ONE TABLET BY MOUTH EVERY DAY, Disp: 90 tablet, Rfl: 1  Allergies   Allergen Reactions   • Penicillins Rash       OBJECTIVE    Vitals:   Vitals:    24 0936    BP: 121/78   Pulse: 80   Temp: 98.5 °F (36.9 °C)   SpO2: 97%   Weight: 68.5 kg (151 lb)   Height: 5' (1.524 m)           Physical Exam  Vitals and nursing note reviewed.   Constitutional:       General: She is not in acute distress.     Appearance: Normal appearance.   HENT:      Head: Normocephalic and atraumatic.      Right Ear: A middle ear effusion is present. There is no impacted cerumen. Tympanic membrane is not erythematous, retracted or bulging.      Left Ear: A middle ear effusion is present. There is no impacted cerumen. Tympanic membrane is not erythematous, retracted or bulging.      Nose: Rhinorrhea present.      Mouth/Throat:      Mouth: Mucous membranes are moist.      Pharynx: No oropharyngeal exudate or posterior oropharyngeal erythema.   Pulmonary:      Effort: Pulmonary effort is normal. No respiratory distress.   Neurological:      Mental Status: She is alert.      Comments: Grossly intact    Psychiatric:         Mood and Affect: Mood normal.                    Amy Westbrook DO  [facility]   1/30/2024  10:14 AM

## 2024-01-30 ENCOUNTER — OFFICE VISIT (OUTPATIENT)
Dept: FAMILY MEDICINE CLINIC | Facility: CLINIC | Age: 59
End: 2024-01-30
Payer: COMMERCIAL

## 2024-01-30 VITALS
BODY MASS INDEX: 29.64 KG/M2 | HEIGHT: 60 IN | TEMPERATURE: 98.5 F | SYSTOLIC BLOOD PRESSURE: 121 MMHG | DIASTOLIC BLOOD PRESSURE: 78 MMHG | OXYGEN SATURATION: 97 % | WEIGHT: 151 LBS | HEART RATE: 80 BPM

## 2024-01-30 DIAGNOSIS — H65.93 FLUID LEVEL BEHIND TYMPANIC MEMBRANE OF BOTH EARS: Primary | ICD-10-CM

## 2024-01-30 PROCEDURE — 99213 OFFICE O/P EST LOW 20 MIN: CPT | Performed by: FAMILY MEDICINE

## 2024-02-26 DIAGNOSIS — D62 ACUTE BLOOD LOSS ANEMIA: ICD-10-CM

## 2024-02-26 DIAGNOSIS — K92.1 MELENA: ICD-10-CM

## 2024-02-26 DIAGNOSIS — E78.2 MIXED HYPERLIPIDEMIA: Primary | ICD-10-CM

## 2024-03-02 ENCOUNTER — APPOINTMENT (OUTPATIENT)
Dept: LAB | Facility: CLINIC | Age: 59
End: 2024-03-02
Payer: COMMERCIAL

## 2024-03-02 DIAGNOSIS — D62 ACUTE BLOOD LOSS ANEMIA: ICD-10-CM

## 2024-03-02 DIAGNOSIS — E78.2 MIXED HYPERLIPIDEMIA: ICD-10-CM

## 2024-03-02 DIAGNOSIS — K92.1 MELENA: ICD-10-CM

## 2024-03-02 LAB
BASOPHILS # BLD AUTO: 0.05 THOUSANDS/ÂΜL (ref 0–0.1)
BASOPHILS NFR BLD AUTO: 1 % (ref 0–1)
CHOLEST SERPL-MCNC: 243 MG/DL
EOSINOPHIL # BLD AUTO: 0.23 THOUSAND/ÂΜL (ref 0–0.61)
EOSINOPHIL NFR BLD AUTO: 3 % (ref 0–6)
ERYTHROCYTE [DISTWIDTH] IN BLOOD BY AUTOMATED COUNT: 14.8 % (ref 11.6–15.1)
HCT VFR BLD AUTO: 42.1 % (ref 34.8–46.1)
HDLC SERPL-MCNC: 61 MG/DL
HGB BLD-MCNC: 13.1 G/DL (ref 11.5–15.4)
IMM GRANULOCYTES # BLD AUTO: 0.03 THOUSAND/UL (ref 0–0.2)
IMM GRANULOCYTES NFR BLD AUTO: 0 % (ref 0–2)
LDLC SERPL CALC-MCNC: 143 MG/DL (ref 0–100)
LYMPHOCYTES # BLD AUTO: 1.89 THOUSANDS/ÂΜL (ref 0.6–4.47)
LYMPHOCYTES NFR BLD AUTO: 28 % (ref 14–44)
MCH RBC QN AUTO: 29.2 PG (ref 26.8–34.3)
MCHC RBC AUTO-ENTMCNC: 31.1 G/DL (ref 31.4–37.4)
MCV RBC AUTO: 94 FL (ref 82–98)
MONOCYTES # BLD AUTO: 0.53 THOUSAND/ÂΜL (ref 0.17–1.22)
MONOCYTES NFR BLD AUTO: 8 % (ref 4–12)
NEUTROPHILS # BLD AUTO: 3.96 THOUSANDS/ÂΜL (ref 1.85–7.62)
NEUTS SEG NFR BLD AUTO: 60 % (ref 43–75)
NONHDLC SERPL-MCNC: 182 MG/DL
NRBC BLD AUTO-RTO: 0 /100 WBCS
PLATELET # BLD AUTO: 326 THOUSANDS/UL (ref 149–390)
PMV BLD AUTO: 12 FL (ref 8.9–12.7)
RBC # BLD AUTO: 4.48 MILLION/UL (ref 3.81–5.12)
TRIGL SERPL-MCNC: 193 MG/DL
WBC # BLD AUTO: 6.69 THOUSAND/UL (ref 4.31–10.16)

## 2024-03-02 PROCEDURE — 85025 COMPLETE CBC W/AUTO DIFF WBC: CPT

## 2024-03-02 PROCEDURE — 80061 LIPID PANEL: CPT

## 2024-03-02 PROCEDURE — 36415 COLL VENOUS BLD VENIPUNCTURE: CPT

## 2024-03-11 PROBLEM — H69.93 DYSFUNCTION OF BOTH EUSTACHIAN TUBES: Status: ACTIVE | Noted: 2024-03-11

## 2024-03-11 PROBLEM — H65.93 BILATERAL OTITIS MEDIA WITH EFFUSION: Status: ACTIVE | Noted: 2024-03-11

## 2024-03-20 PROBLEM — H65.93 BILATERAL OTITIS MEDIA WITH EFFUSION: Status: RESOLVED | Noted: 2024-03-11 | Resolved: 2024-03-20

## 2024-03-20 PROBLEM — D62 ACUTE BLOOD LOSS ANEMIA: Status: RESOLVED | Noted: 2023-09-01 | Resolved: 2024-03-20

## 2024-03-20 PROBLEM — K25.3 ACUTE PREPYLORIC ULCER: Status: RESOLVED | Noted: 2023-09-02 | Resolved: 2024-03-20

## 2024-03-20 PROBLEM — K92.1 MELENA: Status: RESOLVED | Noted: 2023-09-01 | Resolved: 2024-03-20

## 2024-03-20 PROBLEM — H69.93 DYSFUNCTION OF BOTH EUSTACHIAN TUBES: Status: RESOLVED | Noted: 2024-03-11 | Resolved: 2024-03-20

## 2024-03-20 NOTE — PROGRESS NOTES
"Argenis Cadena 1965 female MRN: [id number]      ASSESSMENT/PLAN  Problem List Items Addressed This Visit        Other    Mixed hyperlipidemia    Relevant Medications    rosuvastatin (CRESTOR) 20 MG tablet    Other Relevant Orders    Lipid panel    Comprehensive metabolic panel   Other Visit Diagnoses     Healthcare maintenance    -  Primary        Lipids persistently above goal -- will increase Crestor to 20 mg daily and repeat labs in 3 months to monitor. If still out of range, consider referral to Cardiology to discuss alternative management as well as possible cardiac testing given strong FHx CAD.     Encouraged to eat slowly/chew food well -- if \"stuck\" sensation persists, pt to call GI to discuss evaluation (may benefit from repeat EGD for possible stricture?).    BP WNL   Labs UTD  Pap UTD   Mammogram UTD  CRC UTD   Vaccinations: TDap UTD, Shingles UTD, COVID completed primary defers boosters   Encouraged regular physical activity, varied diet, and regular dental/eye exams         Future Appointments   Date Time Provider Department Center   4/4/2024  8:30 AM Rubina Herrera PA-C [facility]   4/4/2024  8:30 AM [facility]   6/11/2024  1:40 PM Alfred Griffin MD [facility]   6/18/2024  2:00 PM Laura Cruzito, CRNP [facility]          SUBJECTIVE  CC: Physical Exam      HPI:  Argenis Cadena is a 58 y.o. female who presents for annual physical. History reviewed and updated as below.     Lipids: Total 243, , HDL 61,      Review of Systems   Constitutional:  Negative for unexpected weight change.   HENT:  Negative for congestion, ear pain, rhinorrhea and sore throat. Trouble swallowing: feels things getting stuck when eating intermittently -- mainly food, sometimes her pill.   Eyes:  Negative for visual disturbance.   Respiratory:  Negative for cough and shortness of breath.    Cardiovascular:  Negative for chest pain, " palpitations and leg swelling.   Gastrointestinal:  Negative for abdominal pain, blood in stool, constipation and diarrhea.   Endocrine: Negative for polyuria.   Genitourinary:  Negative for dysuria and hematuria.   Neurological:  Negative for dizziness and headaches.   Psychiatric/Behavioral:  Negative for sleep disturbance.        Historical Information   The patient history was reviewed and updated as follows:    Past Medical History:   Diagnosis Date   • Abnormal Pap smear of cervix    • Acute blood loss anemia    • Acute prepyloric ulcer    • Bilateral otitis media with effusion    • Dysfunction of both eustachian tubes    • Hearing loss    • Hyperlipemia    • Hypertension    • Melena    • SIRS (systemic inflammatory response syndrome) ([facility]) 09/01/2023   • Urinary tract infection      Past Surgical History:   Procedure Laterality Date   • APPENDECTOMY     • KNEE SURGERY       Family History   Problem Relation Age of Onset   • Heart disease Mother    • Diabetes Mother    • Kidney disease Mother    • Angina Mother    • Coronary artery disease Mother    • Hyperlipidemia Mother    • Dialysis Mother    • Stroke Mother    • Heart disease Father    • Diabetes Father    • Prostate cancer Father 68   • Coronary artery disease Father    • Cancer Father         bladder cancer   • Hyperlipidemia Father    • No Known Problems Sister    • No Known Problems Maternal Grandmother    • Heart disease Paternal Grandmother    • Coronary artery disease Brother    • Diabetes Brother    • Heart disease Brother    • Hyperlipidemia Brother    • No Known Problems Paternal Aunt    • No Known Problems Paternal Aunt    • No Known Problems Paternal Aunt    • Breast cancer Neg Hx    • Colon cancer Neg Hx    • Ovarian cancer Neg Hx    • Cervical cancer Neg Hx    • Uterine cancer Neg Hx       Social History   Social History     Substance and Sexual Activity   Alcohol Use Yes   • Alcohol/week: 2.0 standard drinks of alcohol   • Types: 2 Glasses  of wine per week     Social History     Substance and Sexual Activity   Drug Use No     Social History     Tobacco Use   Smoking Status Former   • Current packs/day: 0.00   • Types: Cigarettes   • Quit date: 1980   • Years since quittin.8   Smokeless Tobacco Never   Tobacco Comments    never a heavy user; social smoker       Medications:     Current Outpatient Medications:   •  rosuvastatin (CRESTOR) 20 MG tablet, Take 1 tablet (20 mg total) by mouth daily, Disp: 90 tablet, Rfl: 1  •  estrogens, conjugated (Premarin) vaginal cream, Insert 0.5 g into the vagina 2 (two) times a week, Disp: 30 g, Rfl: 1  •  lisinopril (ZESTRIL) 10 mg tablet, TAKE ONE TABLET BY MOUTH EVERY DAY, Disp: 90 tablet, Rfl: 1  Allergies   Allergen Reactions   • Penicillins Rash       OBJECTIVE    Vitals:   Vitals:    24 0816   BP: 136/88   Pulse: 78   Temp: (!) 97.2 °F (36.2 °C)   SpO2: 100%   Weight: 69.9 kg (154 lb)   Height: 5' (1.524 m)           Physical Exam  Vitals and nursing note reviewed.   Constitutional:       General: She is not in acute distress.     Appearance: Normal appearance.   HENT:      Head: Normocephalic and atraumatic.      Right Ear: Tympanic membrane, ear canal and external ear normal.      Left Ear: Tympanic membrane, ear canal and external ear normal.      Nose: Nose normal.      Mouth/Throat:      Mouth: Mucous membranes are moist.      Pharynx: No oropharyngeal exudate or posterior oropharyngeal erythema.   Eyes:      Conjunctiva/sclera: Conjunctivae normal.   Cardiovascular:      Rate and Rhythm: Normal rate and regular rhythm.   Pulmonary:      Effort: Pulmonary effort is normal. No respiratory distress.      Breath sounds: Normal breath sounds.   Abdominal:      General: Bowel sounds are normal. There is no distension.      Palpations: Abdomen is soft.      Tenderness: There is no abdominal tenderness.   Musculoskeletal:      Right lower leg: No edema.      Left lower leg: No edema.    Lymphadenopathy:      Cervical: No cervical adenopathy.   Skin:     General: Skin is warm and dry.   Neurological:      Mental Status: She is alert.      Comments: Grossly intact   Psychiatric:         Mood and Affect: Mood normal.                    Amy Westbrook DO  [facility]   3/21/2024  8:47 AM

## 2024-03-21 ENCOUNTER — OFFICE VISIT (OUTPATIENT)
Dept: FAMILY MEDICINE CLINIC | Facility: CLINIC | Age: 59
End: 2024-03-21
Payer: COMMERCIAL

## 2024-03-21 VITALS
OXYGEN SATURATION: 100 % | DIASTOLIC BLOOD PRESSURE: 88 MMHG | BODY MASS INDEX: 30.23 KG/M2 | WEIGHT: 154 LBS | TEMPERATURE: 97.2 F | HEIGHT: 60 IN | HEART RATE: 78 BPM | SYSTOLIC BLOOD PRESSURE: 136 MMHG

## 2024-03-21 DIAGNOSIS — E78.2 MIXED HYPERLIPIDEMIA: ICD-10-CM

## 2024-03-21 DIAGNOSIS — Z00.00 HEALTHCARE MAINTENANCE: Primary | ICD-10-CM

## 2024-03-21 PROCEDURE — 99396 PREV VISIT EST AGE 40-64: CPT | Performed by: FAMILY MEDICINE

## 2024-03-21 RX ORDER — ROSUVASTATIN CALCIUM 20 MG/1
20 TABLET, COATED ORAL DAILY
Qty: 90 TABLET | Refills: 1 | Status: SHIPPED | OUTPATIENT
Start: 2024-03-21

## 2024-06-11 ENCOUNTER — OFFICE VISIT (OUTPATIENT)
Age: 59
End: 2024-06-11
Payer: COMMERCIAL

## 2024-06-11 VITALS — WEIGHT: 150 LBS | HEIGHT: 60 IN | BODY MASS INDEX: 29.45 KG/M2 | TEMPERATURE: 98.5 F

## 2024-06-11 DIAGNOSIS — L82.1 SEBORRHEIC KERATOSES: Primary | ICD-10-CM

## 2024-06-11 DIAGNOSIS — L81.4 LENTIGO: ICD-10-CM

## 2024-06-11 DIAGNOSIS — D18.01 CHERRY ANGIOMA: ICD-10-CM

## 2024-06-11 DIAGNOSIS — D22.9 MULTIPLE NEVI: ICD-10-CM

## 2024-06-11 DIAGNOSIS — R23.2 FACIAL FLUSHING: ICD-10-CM

## 2024-06-11 PROCEDURE — 99213 OFFICE O/P EST LOW 20 MIN: CPT | Performed by: NURSE PRACTITIONER

## 2024-06-11 NOTE — PATIENT INSTRUCTIONS
"MELANOCYTIC NEVI (\"Moles\")        Melanocytic nevi (\"moles\") are tan or brown, raised or flat areas of the skin which have an increased number of melanocytes. Melanocytes are the cells in our body which make pigment and account for skin color.    Some moles are present at birth (I.e., \"congenital nevi\"), while others come up later in life (i.e., \"acquired nevi\").  The sun can stimulate the body to make more moles.  Sunburns are not the only thing that triggers more moles.  Chronic sun exposure can do it too.     Clinically distinguishing a healthy mole from melanoma may be difficult, even for experienced dermatologists. The \"ABCDE's\" of moles have been suggested as a means of helping to alert a person to a suspicious mole and the possible increased risk of melanoma.  The suggestions for raising alert are as follows:    Asymmetry: Healthy moles tend to be symmetric, while melanomas are often asymmetric.  Asymmetry means if you draw a line through the mole, the two halves do not match in color, size, shape, or surface texture. Asymmetry can be a result of rapid enlargement of a mole, the development of a raised area on a previously flat lesion, scaling, ulceration, bleeding or scabbing within the mole.  Any mole that starts to demonstrate \"asymmetry\" should be examined promptly by a board certified dermatologist.     Border: Healthy moles tend to have discrete, even borders.  The border of a melanoma often blends into the normal skin and does not sharply delineate the mole from normal skin.  Any mole that starts to demonstrate \"uneven borders\" should be examined promptly by a board certified dermatologist.     Color: Healthy moles tend to be one color throughout.  Melanomas tend to be made up of different colors ranging from dark black, blue, white, or red.  Any mole that demonstrates a color change should be examined promptly by a board certified dermatologist.     Diameter: Healthy moles tend to be smaller than 0.6 " "cm in size; an exception are \"congenital nevi\" that can be larger.  Melanomas tend to grow and can often be greater than 0.6 cm (1/4 of an inch, or the size of a pencil eraser). This is only a guideline, and many normal moles may be larger than 0.6 cm without being unhealthy.  Any mole that starts to change in size (small to bigger or bigger to smaller) should be examined promptly by a board certified dermatologist.     Evolving: Healthy moles tend to \"stay the same.\"  Melanomas may often show signs of change or evolution such as a change in size, shape, color, or elevation.  Any mole that starts to itch, bleed, crust, burn, hurt, or ulcerate or demonstrate a change or evolution should be examined promptly by a board certified dermatologist.      Dysplastic Nevi  Dysplastic moles are moles that fit the ABCDE rules of melanoma but are not identified as melanomas when examined under the microscope.  They may indicate an increased risk of melanoma in that person. If there is a family history of melanoma, most experts agree that the person may be at an increased risk for developing a melanoma.  Experts still do not agree on what dysplastic moles mean in patients without a personal or family history of melanoma.  Dysplastic moles are usually larger than common moles and have different colors within it with irregular borders. The appearance can be very similar to a melanoma. Biopsies of dysplastic moles may show abnormalities which are different from a regular mole.      Melanoma  Malignant melanoma is a type of skin cancer that can be deadly if it spreads throughout the body. The incidence of melanoma in the [place] is growing faster than any other cancer. Melanoma usually grows near the surface of the skin for a period of time, and then begins to grow deeper into the skin. Once it grows deeper into the skin, the risk of spread to other organs greatly increases. Therefore, early detection and removal of a malignant " "melanoma may result in a better chance at a complete cure; removal after the tumor has spread may not be as effective, leading to worse clinical outcomes such as death.    The true rate of nevus transformation into a melanoma is unknown. It has been estimated that the lifetime risk for any acquired melanocytic nevus on any 20-year-old individual transforming into melanoma by age 80 is 0.03% (1 in 3,164) for men and 0.009% (1 in 10,800) for women.     The appearance of a \"new mole\" remains one of the most reliable methods for identifying a malignant melanoma.  Occasionally, melanomas appear as rapidly growing, blue-black, dome-shaped bumps within a previous mole or previous area of normal skin.  Other times, melanomas are suspected when a mole suddenly appears or changes. Itching, burning, or pain in a pigmented lesion should increase suspicion, but most patients with early melanoma have no skin discomfort whatsoever.  Melanoma can occur anywhere on the skin, including areas that are difficult for self-examination. Many melanomas are first noticed by other family members.  Suspicious-looking moles may be removed for microscopic examination.       You may be able to prevent death from melanoma by doing two simple things:    Try to avoid unnecessary sun exposure and protect your skin when it is exposed to the sun.  People who live near the equator, people who have intermittent exposures to large amounts of sun, and people who have had sunburns in childhood or adolescence have an increased risk for melanoma. Sun sense and vigilant sun protection may be keys to helping to prevent melanoma.  We recommend wearing UPF-rated sun protective clothing and sunglasses whenever possible and applying a moisturizer-sunscreen combination product (SPF 50+) such as Neutrogena Daily Defense to sun exposed areas of skin at least three times a day.    Have your moles regularly examined by a board certified dermatologist AND by yourself or " "a family member/friend at home.  We recommend that you have your moles examined at least once a year by a board certified dermatologist.  Use your birthday as an annual reminder to have your \"Birthday Suit\" (I.e., your skin) examined; it is a nice birthday gift to yourself to know that your skin is healthy appearing!  Additionally, at-home self examinations may be helpful for detecting a possible melanoma.  Use the ABCDEs we discussed and check your moles once a month at home.        SEBORRHEIC KERATOSIS  A seborrheic keratosis is a harmless warty spot that appears during adult life as a common sign of skin aging.  Seborrheic keratoses can arise on any area of skin, covered or uncovered, with the usual exception of the palms and soles. They do not arise from mucous membranes. Seborrheic keratoses can have highly variable appearance.      Seborrheic keratoses are extremely common. It has been estimated that over 90% of adults over the age of 60 years have one or more of them. They occur in males and females of all races, typically beginning to erupt in the 30s or 40s. They are uncommon under the age of 20 years.  The precise cause of seborrhoeic keratoses is not known.  Seborrhoeic keratoses are considered degenerative in nature. As time goes by, seborrheic keratoses tend to become more numerous. Some people inherit a tendency to develop a very large number of them; some people may have hundreds of them.    The name \"seborrheic keratosis\" is misleading, because these lesions are not limited to a seborrhoeic distribution (scalp, mid-face, chest, upper back), nor are they formed from sebaceous glands, nor are they associated with sebum -- which is greasy.  Seborrheic keratosis may also be called \"SK,\" \"Seb K,\" \"basal cell papilloma,\" \"senile wart,\" or \"barnacle.\"      There is no easy way to remove multiple lesions on a single occasion.  Unless a specific lesion is \"inflamed\" and is causing pain or stinging/burning or " "is bleeding, most insurance companies do not authorize treatment.      ANGIOMA (\"CHERRY ANGIOMA\")  [place] angiomas markedly increase in number from about the age of 40, so it has been estimated that 75% of people over 75 years of age have them. Although they also called \"senile angiomas,\" they can occur in young people too - 5% of adolescents have been found to have them.     Cherry angiomas are very common in males and females of any age or race, with no difference in sexes or races affected. They are however more noticeable in white skin than in skin of colour.  There may be a family history of similar lesions. Eruptive (very large number appearing in a short period of time) cherry angiomas have been rarely reported to be associated with internal malignancy and pregnancy.     ROSACEA      Assessment and Plan:  Based on a thorough discussion of this condition and the management approach to it (including a comprehensive discussion of the known risks, side effects and potential benefits of treatment), the patient (family) agrees to implement the following specific plan:   Can apply otc afrin nasal spray to cheeks daily     Rosacea is a chronic rash affecting the mid-face including the nose, cheeks, chin, forehead, and eyelids. The incidence is usually greatest between the ages of 30-60 years and is more common in people with fair skin. Common characteristics include redness, telangiectasias, papules and pustules over affected areas. Rosacea may look similar to acne, but there is a lack of comedones. Occasionally the eyes may also be involved in ocular rosacea. In advanced disease, enlargement of the sebaceous glands in the nose, termed rhinophyma, may be present.     Rosacea results in red spots (papules) and sometimes pustules over the face, but unlike acne there are no blackheads, whiteheads, or cystic nodules. Patients often experience increased facial flushing with prominent blood vessels " (erythematotelangiectatic rosacea) and dry, sensitive skin. These symptoms are exacerbated by sun exposure, hot or spicy foods, topical steroids and oil-based facial products.     In ocular rosacea, eyelids may be red and sore due to conjunctivitis, keratitis, and episcleritis. If rhinophyma develops due to enlargement of sebaceous glands, the patient may have an enlarged and irregularly shaped nose with prominent pores. In rosacea that is refractory to treatment, patients can develop persistent redness and swelling of the face due to lymphatic obstruction (Morbihan disease).     Distribution around the cheeks may be confused with the malar or “butterfly rash” of lupus. However, the rash of lupus spares the nasal creases and lacks papules and pustules. If signs of photosensitivity, oral ulcers, arthritis, and kidney dysfunction are present then consider referral to a rheumatologist.     There are many potential causes of rosacea including genetic, environmental, vascular, and inflammatory factors. These include, but are not limited to:  Chronic exposure to ultraviolet radiation   Increased immune responses in the form of cathelicidins that promote vessel dilation and infiltration with white blood cells (neutrophils) into the dermis  Increased matrix metalloproteinases such as collagen and elastase that remodel normal tissue may contribute to inflammation of the skin making it thicker and harder  There is some evidence to suggest that increased numbers of demodex mites on patient skin may contribute to rosacea papules     General Treatment Approach   Avoid exacerbating factors such as heat, spicy foods, and alcohol   Use daily SPF30+ sunscreen and other methods of coverage for sun protection  Use water-based make-up   Avoid applying topical steroids to affected areas as they can cause perioral dermatitis and exacerbate rosacea     Topical Treatment Approach  Metronidazole cream or gel by itself or in combination with  oral antibiotics for more severe cases  Azelaic acid cream or lotion is effective for mild inflammatory rosacea when applied twice daily to affected areas  Brimonidine gel and oxymetazoline hydrochloride cream can reduce facial redness temporarily   Ivermectin cream can treat papulopustular rosacea by controlling demodex mites and inflammation   Pimecrolimus cream or tacrolimus ointment twice a day for 2-3 months can help reduce inflammation    Oral Treatment Approach  Antibiotics such as doxycycline, minocycline, or erythromycin for 1-3 months  Clonidine and carvedilol can help reduce facial flushing and are generally well tolerated. Common side effects include low blood pressure, gastrointestinal upset, dry eyes, blurred vision and low heart rate.   Isotretinoin at low doses can be effective for long term treatment when antibiotics fail. Side effects may make it unsuitable for some patients.   NSAIDs such as diclofenac can help reduce discomfort and redness in the skin.     Procedural/Surgical Treatment Approach   Vascular lasers or intense pulsed light treatment may be used to treat persistent telangiectasia and papulopustular rosacea  Plastic surgery and carbon dioxide lasers may be used to treat rhinophyma

## 2024-06-11 NOTE — PROGRESS NOTES
"[facility] Dermatology Clinic Note     Patient Name: Argenis Cadena  Encounter Date: 6/11/2024    Have you been cared for by a [facility] Dermatologist in the last 3 years and, if so, which description applies to you?    Yes.  I have been here within the last 3 years, and my medical history has NOT changed since that time.  I am FEMALE/of child-bearing potential.    REVIEW OF SYSTEMS:  Have you recently had or currently have any of the following? No changes in my recent health.   PAST MEDICAL HISTORY:  Have you personally ever had or currently have any of the following?  If \"YES,\" then please provide more detail. No changes in my medical history.   HISTORY OF IMMUNOSUPPRESSION: Do you have a history of any of the following:  Systemic Immunosuppression such as Diabetes, Biologic or Immunotherapy, Chemotherapy, Organ Transplantation, Bone Marrow Transplantation?  No     Answering \"YES\" requires the addition of the dotphrase \"IMMUNOSUPPRESSED\" as the first diagnosis of the patient's visit.   FAMILY HISTORY:  Any \"first degree relatives\" (parent, brother, sister, or child) with the following?    No changes in my family's known health.   PATIENT EXPERIENCE:    Do you want the Dermatologist to perform a COMPLETE skin exam today including a clinical examination under the \"bra and underwear\" areas?  Yes  If necessary, do we have your permission to call and leave a detailed message on your Preferred Phone number that includes your specific medical information?  Yes      Allergies   Allergen Reactions    Penicillins Rash      Current Outpatient Medications:     estrogens, conjugated (Premarin) vaginal cream, Insert 0.5 g into the vagina 2 (two) times a week, Disp: 30 g, Rfl: 1    lisinopril (ZESTRIL) 10 mg tablet, TAKE ONE TABLET BY MOUTH EVERY DAY, Disp: 90 tablet, Rfl: 1    rosuvastatin (CRESTOR) 20 MG tablet, Take 1 tablet (20 mg total) by mouth daily, Disp: 90 tablet, Rfl: 1          Whom besides the patient is " "providing clinical information about today's encounter?   NO ADDITIONAL HISTORIAN (patient alone provided history)    Physical Exam and Assessment/Plan by Diagnosis:    CHIEF COMPLAINT    59 year old female patient presents today for Yearly Follow Up.  Patient has a No history of skin cancer    MELANOCYTIC NEVI (\"Moles\")    Physical Exam:  Anatomic Location Affected: Mostly on sun-exposed areas of the trunk and extremities   Morphological Description:  Scattered, 1-4mm round to ovoid, symmetrical-appearing, even bordered, skin colored to dark brown macules/papules, mostly in sun-exposed areas  Pertinent Positives:  Pertinent Negatives:    Additional History of Present Condition:  Present on exam     Assessment and Plan:  Based on a thorough discussion of this condition and the management approach to it (including a comprehensive discussion of the known risks, side effects and potential benefits of treatment), the patient (family) agrees to implement the following specific plan:  Provided handout with information regarding the ABCDE's of moles   Recommend routine skin exams every year    Sun avoidance, protective clothing (known as UPF clothing), and the use of at least SPF 30 sunscreens is advised. Sunscreen should be reapplied every two hours when outside.       SEBORRHEIC KERATOSIS; NON-INFLAMED    Physical Exam:  Anatomic Location Affected:  scattered across trunk, extremities,  face  Morphological Description:  Flat and raised, waxy, smooth to warty textured, yellow to brownish-grey to dark brown to blackish, discrete, \"stuck-on\" appearing papules.  Pertinent Positives:  Pertinent Negatives:    Additional History of Present Condition:  Patient reports new bumps on the skin.  Denies itch, burn, pain, bleeding or ulceration.  Present constantly; nothing seems to make it worse or better.  No prior treatment.      Assessment and Plan:  Based on a thorough discussion of this condition and the management approach to " "it (including a comprehensive discussion of the known risks, side effects and potential benefits of treatment), the patient (family) agrees to implement the following specific plan:  Reassured benign    LENTIGO    Physical Exam:  Anatomic Location Affected:  arms, trunk, face  Morphological Description:  light brown macules  Pertinent Positives:  Pertinent Negatives:    Additional History of Present Condition:  Present on exam    Assessment and Plan:  Based on a thorough discussion of this condition and the management approach to it (including a comprehensive discussion of the known risks, side effects and potential benefits of treatment), the patient (family) agrees to implement the following specific plan:  Reassured benign  Continue to monitor      ANGIOMA (\"CHERRY ANGIOMA\")    Physical Exam:  Anatomic Location: scattered across sun exposed areas of the trunk and extremities   Morphologic Description: Firm red to reddish-blue discrete papules  Pertinent Positives:  Pertinent Negatives:    Additional History of Present Condition:  Present on exam.     Assessment and Plan:  Reassured benign    FACIAL FLUSHING    Physical Exam:  Anatomic Location Affected:  bilateral cheeks   Morphological Description:  telangectasia, mild erythema   Pertinent Positives:  Pertinent Negatives:    Additional History of Present Condition:  Patient reports pink/red cheeks triggered with alcohol, exercise, warmth.  She has had tiny bumps associated with redness in the past, but this has since cleared.  Patient states father has history of rosacea.     Assessment and Plan:  Based on a thorough discussion of this condition and the management approach to it (including a comprehensive discussion of the known risks, side effects and potential benefits of treatment), the patient (family) agrees to implement the following specific plan:  Can apply otc afrin nasal spray topically to cheeks daily or as needed to minimize redness  Reviewed factors " that can cause or exacerbate symptoms        Scribe Attestation      I,:  Lilia Hay MA am acting as a scribe while in the presence of the attending physician.:       I,:  Alfred Griffin MD personally performed the services described in this documentation    as scribed in my presence.:

## 2024-07-01 ENCOUNTER — OFFICE VISIT (OUTPATIENT)
Age: 59
End: 2024-07-01
Payer: COMMERCIAL

## 2024-07-01 VITALS — HEIGHT: 60 IN | BODY MASS INDEX: 30.23 KG/M2 | TEMPERATURE: 97.6 F | WEIGHT: 154 LBS

## 2024-07-01 DIAGNOSIS — L25.9 CONTACT DERMATITIS, UNSPECIFIED CONTACT DERMATITIS TYPE, UNSPECIFIED TRIGGER: Primary | ICD-10-CM

## 2024-07-01 DIAGNOSIS — L25.5 RHUS DERMATITIS: ICD-10-CM

## 2024-07-01 PROCEDURE — 99213 OFFICE O/P EST LOW 20 MIN: CPT

## 2024-07-01 RX ORDER — CLOBETASOL PROPIONATE 0.5 MG/G
CREAM TOPICAL 2 TIMES DAILY
Qty: 45 G | Refills: 0 | Status: SHIPPED | OUTPATIENT
Start: 2024-07-01

## 2024-07-01 NOTE — PROGRESS NOTES
"[facility] Dermatology Clinic Note     Patient Name: Argenis Cadena  Encounter Date: 07/01/2024     Have you been cared for by a [facility] Dermatologist in the last 3 years and, if so, which description applies to you?    Yes.  I have been here within the last 3 years, and my medical history has NOT changed since that time.  I am FEMALE/of child-bearing potential.    REVIEW OF SYSTEMS:  Have you recently had or currently have any of the following? No changes in my recent health.   PAST MEDICAL HISTORY:  Have you personally ever had or currently have any of the following?  If \"YES,\" then please provide more detail. No changes in my medical history.   HISTORY OF IMMUNOSUPPRESSION: Do you have a history of any of the following:  Systemic Immunosuppression such as Diabetes, Biologic or Immunotherapy, Chemotherapy, Organ Transplantation, Bone Marrow Transplantation?  No     Answering \"YES\" requires the addition of the dotphrase \"IMMUNOSUPPRESSED\" as the first diagnosis of the patient's visit.   FAMILY HISTORY:  Any \"first degree relatives\" (parent, brother, sister, or child) with the following?    No changes in my family's known health.   PATIENT EXPERIENCE:    Do you want the Dermatologist to perform a COMPLETE skin exam today including a clinical examination under the \"bra and underwear\" areas?  NO  If necessary, do we have your permission to call and leave a detailed message on your Preferred Phone number that includes your specific medical information?  Yes      Allergies   Allergen Reactions    Penicillins Rash      Current Outpatient Medications:     estrogens, conjugated (Premarin) vaginal cream, Insert 0.5 g into the vagina 2 (two) times a week, Disp: 30 g, Rfl: 1    lisinopril (ZESTRIL) 10 mg tablet, TAKE ONE TABLET BY MOUTH EVERY DAY, Disp: 90 tablet, Rfl: 1    rosuvastatin (CRESTOR) 20 MG tablet, Take 1 tablet (20 mg total) by mouth daily, Disp: 90 tablet, Rfl: 1          Whom besides the patient is " providing clinical information about today's encounter?   NO ADDITIONAL HISTORIAN (patient alone provided history)    59 year old female present with a rash on her forearms that appeared a week ago.      Physical Exam and Assessment/Plan by Diagnosis:    RHUS DERMATITIS    Physical Exam:  Anatomic Location Affected:  forearms   Morphological Description:  erythematous vesicles scattered on forearms and a linear streak of vesicles on left upper forearm    Additional History of Present Condition:  present for one week. Does report she did one day of community service about 2 weeks ago    Assessment and Plan:  Diagnosis:  Contact dermatitis  Based on a thorough discussion of this condition and the management approach to it (including a comprehensive discussion of the known risks, side effects and potential benefits of treatment), the patient (family) agrees to implement the following specific plan:    Use Clobetasol 0.05% cream twice a day for two weeks only on affected areas  Wash clothing, gloves and sheets to avoid spreading     What is contact dermatitis?  Contact dermatitis is a type of eczema that results from something coming in contact with the skin.  There are 2 types:  irritant and allergic.  The majority of cases are from irritation.  Usually that is from contact with strong soaps, repeated exposure to water, contact with cleaning agents or food, or friction.  The minority are an actual allergy.  In these cases something is coming in contact with the skin and causing an allergic reaction, similar to what happens with poison ivy.  This usually occurs unexpectedly after using something for many years.  Even very tiny amounts of the substance on the skin can cause a reaction.  Some common causes are fragrances, preservatives and metals.  Sometimes this can occur when an allergic substance is eaten, as well, but most often it is from direct contact with the skin.  To determine the cause of allergy a patch test  is often done.    Some general rules to follow for both types of contact dermatitis are:   Wear gloves when using strong cleansers or before prolonged contact with water (like washing dishes)   Use gentle cleansers and avoid strong soaps   Apply moisturizer to entire body after bathing    Avoid products with fragrance    Most often contact dermatitis is treated with topical medicines like topical steroids, eucrisa, pimecrolimus or tacrolimus..  Some times oral steroids, ie prednisone, methylprednisone and prednisolone, are needed.  In chronic cases, treatment options include:  light therapy, methotrexate, cyclosporin.    Scribe Attestation      I,:  Grace Arshad am acting as a scribe while in the presence of the attending physician.:       I,:  Jenifer Vasquez PA-C personally performed the services described in this documentation    as scribed in my presence.:

## 2024-07-01 NOTE — PATIENT INSTRUCTIONS
RHUS DERMATITIS    Assessment and Plan:  Diagnosis:  Contact dermatitis  Based on a thorough discussion of this condition and the management approach to it (including a comprehensive discussion of the known risks, side effects and potential benefits of treatment), the patient (family) agrees to implement the following specific plan:  Use Clobetasol 0.05% cream twice a day for two weeks only on affected areas  Wash clothing, gloves and sheets to avoid spreading   What is contact dermatitis?  Contact dermatitis is a type of eczema that results from something coming in contact with the skin.  There are 2 types:  irritant and allergic.  The majority of cases are from irritation.  Usually that is from contact with strong soaps, repeated exposure to water, contact with cleaning agents or food, or friction.  The minority are an actual allergy.  In these cases something is coming in contact with the skin and causing an allergic reaction, similar to what happens with poison ivy.  This usually occurs unexpectedly after using something for many years.  Even very tiny amounts of the substance on the skin can cause a reaction.  Some common causes are fragrances, preservatives and metals.  Sometimes this can occur when an allergic substance is eaten, as well, but most often it is from direct contact with the skin.  To determine the cause of allergy a patch test is often done.    Some general rules to follow for both types of contact dermatitis are:   Wear gloves when using strong cleansers or before prolonged contact with water (like washing dishes)   Use gentle cleansers and avoid strong soaps   Apply moisturizer to entire body after bathing    Avoid products with fragrance    Most often contact dermatitis is treated with topical medicines like topical steroids, eucrisa, pimecrolimus or tacrolimus..  Some times oral steroids, ie prednisone, methylprednisone and prednisolone, are needed.  In chronic cases, treatment options  include:  light therapy, methotrexate, cyclosporin.

## 2024-07-07 ENCOUNTER — PATIENT MESSAGE (OUTPATIENT)
Dept: FAMILY MEDICINE CLINIC | Facility: CLINIC | Age: 59
End: 2024-07-07

## 2024-07-07 DIAGNOSIS — Z12.31 VISIT FOR SCREENING MAMMOGRAM: Primary | ICD-10-CM

## 2024-07-11 NOTE — TELEPHONE ENCOUNTER
----- Message from Sharon Morse sent at 9/22/2021  7:40 AM EDT -----  Regarding: RE:reply  Contact: [phone]  Thank you, Shiloh Tay  One more question - I read the pamphlet and believe it noted that when taking Erythromycin (pretty sure it was that drug - no longer have pamphlet), it could negate the effects of the cream  I am using an Erythromycin ointment for my eye  I only put a small amount in each day for the next 3 weeks  I was thinking because the E-mycin is an ointment, not a pill, there may not be the same precaution  Is it okay to use both at same time? Thanks and have a great day! Detail Level: Detailed

## 2024-07-22 ENCOUNTER — HOSPITAL ENCOUNTER (OUTPATIENT)
Age: 59
Discharge: HOME | End: 2024-07-22
Payer: COMMERCIAL

## 2024-07-22 DIAGNOSIS — Z12.31 VISIT FOR SCREENING MAMMOGRAM: ICD-10-CM

## 2024-07-22 PROCEDURE — 77063 BREAST TOMOSYNTHESIS BI: CPT

## 2024-07-22 PROCEDURE — 77067 SCR MAMMO BI INCL CAD: CPT

## 2024-08-20 ENCOUNTER — TELEPHONE (OUTPATIENT)
Age: 59
End: 2024-08-20

## 2024-08-20 NOTE — TELEPHONE ENCOUNTER
Pt reports she is having urgency, frequency and thinks she has a UTI.  Pt would like to know if PCP wants pt to make an appt or just bring in a urine sample.   Please call pt back with next step care plan @Phone: [phone].

## 2024-08-20 NOTE — PROGRESS NOTES
"Ambulatory Visit  Name: Argenis Cadena      : 1965      MRN: [id number]  Encounter Provider: Amy Westbrook DO  Encounter Date: 2024   Encounter department: [facility]    Assessment & Plan   1. UTI symptoms  Assessment & Plan:  UA (+) leuks, blood -- will send for microscopic and culture. Cover with Bactrim while awaiting results. Reviewed possible ADRs including GI upset, encouraged yogurt and/or probiotic. Encouraged good hydration.   Orders:  -     sulfamethoxazole-trimethoprim (BACTRIM DS) 800-160 mg per tablet; Take 1 tablet by mouth 2 (two) times a day for 3 days  -     POCT urine dip auto non-scope  -     Urine culture; Future  -     Urinalysis with microscopic; Future     History of Present Illness     HPI    Pt presents due to UTI symptoms    Onset  -- increased urinary frequency/urgency  No dysuria, hematuria, flank pain, fever    Review of Systems   Constitutional:  Negative for fever.   Genitourinary:  Positive for frequency and urgency. Negative for dysuria, flank pain and hematuria.     Medical History Reviewed by provider this encounter:  Tobacco  Allergies  Meds  Problems  Med Hx  Surg Hx  Fam Hx       Objective     /84   Pulse 80   Temp (!) 96.6 °F (35.9 °C)   Ht 5' 7\" (1.702 m)   Wt 70.8 kg (156 lb)   LMP  (LMP Unknown)   SpO2 99%   BMI 24.43 kg/m²     Physical Exam  Vitals and nursing note reviewed.   Constitutional:       General: She is not in acute distress.     Appearance: Normal appearance.   Pulmonary:      Effort: Pulmonary effort is normal. No respiratory distress.   Neurological:      Mental Status: She is alert.      Comments: Grossly intact   Psychiatric:         Mood and Affect: Mood normal.       Administrative Statements         "

## 2024-08-20 NOTE — TELEPHONE ENCOUNTER
8/20 @ 9:20 am, I called patient, received her voicemail. I did leave her a message asking her to call us back.

## 2024-08-21 ENCOUNTER — OFFICE VISIT (OUTPATIENT)
Dept: FAMILY MEDICINE CLINIC | Facility: CLINIC | Age: 59
End: 2024-08-21
Payer: COMMERCIAL

## 2024-08-21 VITALS
HEART RATE: 80 BPM | SYSTOLIC BLOOD PRESSURE: 128 MMHG | WEIGHT: 156 LBS | DIASTOLIC BLOOD PRESSURE: 84 MMHG | HEIGHT: 67 IN | OXYGEN SATURATION: 99 % | TEMPERATURE: 96.6 F | BODY MASS INDEX: 24.48 KG/M2

## 2024-08-21 DIAGNOSIS — R39.9 UTI SYMPTOMS: Primary | ICD-10-CM

## 2024-08-21 LAB
BACTERIA UR QL AUTO: ABNORMAL /HPF
BILIRUB UR QL STRIP: NEGATIVE
CLARITY UR: CLEAR
COLOR UR: ABNORMAL
GLUCOSE UR STRIP-MCNC: NEGATIVE MG/DL
HGB UR QL STRIP.AUTO: ABNORMAL
KETONES UR STRIP-MCNC: NEGATIVE MG/DL
LEUKOCYTE ESTERASE UR QL STRIP: ABNORMAL
NITRITE UR QL STRIP: NEGATIVE
NON-SQ EPI CELLS URNS QL MICRO: ABNORMAL /HPF
PH UR STRIP.AUTO: 7 [PH]
PROT UR STRIP-MCNC: NEGATIVE MG/DL
RBC #/AREA URNS AUTO: ABNORMAL /HPF
SL AMB  POCT GLUCOSE, UA: ABNORMAL
SL AMB LEUKOCYTE ESTERASE,UA: ABNORMAL
SL AMB POCT BILIRUBIN,UA: ABNORMAL
SL AMB POCT BLOOD,UA: ABNORMAL
SL AMB POCT KETONES,UA: ABNORMAL
SL AMB POCT NITRITE,UA: ABNORMAL
SL AMB POCT PH,UA: 6
SL AMB POCT SPECIFIC GRAVITY,UA: 1.01
SL AMB POCT URINE PROTEIN: ABNORMAL
SL AMB POCT UROBILINOGEN: 0.2
SP GR UR STRIP.AUTO: 1.01 (ref 1–1.03)
UROBILINOGEN UR STRIP-ACNC: <2 MG/DL
WBC #/AREA URNS AUTO: ABNORMAL /HPF

## 2024-08-21 PROCEDURE — 99213 OFFICE O/P EST LOW 20 MIN: CPT | Performed by: FAMILY MEDICINE

## 2024-08-21 PROCEDURE — 81001 URINALYSIS AUTO W/SCOPE: CPT | Performed by: FAMILY MEDICINE

## 2024-08-21 PROCEDURE — 87086 URINE CULTURE/COLONY COUNT: CPT | Performed by: FAMILY MEDICINE

## 2024-08-21 PROCEDURE — 87077 CULTURE AEROBIC IDENTIFY: CPT | Performed by: FAMILY MEDICINE

## 2024-08-21 PROCEDURE — 81003 URINALYSIS AUTO W/O SCOPE: CPT | Performed by: FAMILY MEDICINE

## 2024-08-21 PROCEDURE — 87186 SC STD MICRODIL/AGAR DIL: CPT | Performed by: FAMILY MEDICINE

## 2024-08-21 RX ORDER — SULFAMETHOXAZOLE/TRIMETHOPRIM 800-160 MG
1 TABLET ORAL 2 TIMES DAILY
Qty: 6 TABLET | Refills: 0 | Status: SHIPPED | OUTPATIENT
Start: 2024-08-21 | End: 2024-08-24

## 2024-08-21 NOTE — ASSESSMENT & PLAN NOTE
UA (+) leuks, blood -- will send for microscopic and culture. Cover with Bactrim while awaiting results. Reviewed possible ADRs including GI upset, encouraged yogurt and/or probiotic. Encouraged good hydration.

## 2024-08-23 LAB
BACTERIA UR CULT: ABNORMAL
BACTERIA UR CULT: ABNORMAL

## 2024-08-26 ENCOUNTER — TELEPHONE (OUTPATIENT)
Dept: FAMILY MEDICINE CLINIC | Facility: CLINIC | Age: 59
End: 2024-08-26

## 2024-08-26 NOTE — TELEPHONE ENCOUNTER
----- Message from Amy Westbrook, DO sent at 8/25/2024  6:32 PM EDT -----  Please let pt know her urine culture did show bacteria -- should respond to antibiotic we started at her visit

## 2024-09-04 NOTE — PROGRESS NOTES
Assessment/Plan:  Calcium 1200-1500mg (in divided doses-max 600 mg at one time) + 600-1000 IU Vit D daily.   Exercise 150-300 minutes per week minimum including weight bearing exercises.   Pap with high risk HPV Q 5 years, if normal.  Collected today.   Call your insurance company to verify coverage prior to completing any ordered tests.   Annual mammogram ordered and monthly breast self exam recommended.    Colonoscopy- Due          Kegels 20 times twice daily.   Silicone based lubricant with sex. (Use water based lubricant with condoms or sexual toys.)    Vaginal moisturizers twice weekly as needed.   Premarin refilled as requested and sent to pharmacy on file. Use as directed. Aware of benefits, risks and alternatives.   Return to office in one year or sooner, if needed.        1. Encntr for gyn exam (general) (routine) w/o abn findings  2. Encounter for Papanicolaou smear for cervical cancer screening  -     Liquid-based pap, screening  3. Encounter for screening mammogram for breast cancer  -     Mammo screening bilateral w 3d and cad; Future; Expected date: 2025  4. Vaginal atrophy  -     estrogens, conjugated (Premarin) vaginal cream; Insert 0.5 g into the vagina 2 (two) times a week             Subjective:      Patient ID: Argenis Cadena is a 59 y.o. female.    HPI    Argenis Cadena is a 59 y.o.   female who is here today for her annual visit. No gynecologic health concerns.   Last in office on 23 for annual exam.   Postmenopausal with no vaginal bleeding.   Exercise- walk 10 minutes per day.   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     Argenis Cadena is sexually active with male partner/  of 30 years. Monogamous and feels safe in this relationship. Denies vaginal pain,bleeding or dryness.  Premarin relieves the vaginal dryness.   She uses nothing  for contraception.    She is not interested in STD screening today.   She denies vaginal discharge, itching or pelvic pain.   She has no  "urinary concerns, no longer has stress urinary incontinence since following with [facility] PT. No bowel concerns.  No breast concerns.     Last pap: 12/04/2019 normal with negative HR HPV   Mammogram: 07/22/2024 normal with heterogeneously dense breast tissue with LTC 12.07%  Colonoscopy: 05/15/2017  recall 5-10 years   DEXA scan: Not on file      Family history of cancer:   Cancer-related family history includes Cancer in her father; Prostate cancer (age of onset: 68) in her father. There is no history of Breast cancer, Colon cancer, Ovarian cancer, Cervical cancer, or Uterine cancer.      The following portions of the patient's history were reviewed and updated as appropriate: allergies, current medications, past family history, past medical history, past social history, past surgical history, and problem list.    Review of Systems   Constitutional: Negative.  Negative for activity change, appetite change, chills, diaphoresis, fatigue, fever and unexpected weight change.   HENT:  Negative for congestion, dental problem, sneezing, sore throat and trouble swallowing.    Eyes:  Negative for visual disturbance.   Respiratory:  Negative for chest tightness and shortness of breath.    Cardiovascular:  Negative for chest pain and leg swelling.   Gastrointestinal:  Negative for abdominal pain, constipation, diarrhea, nausea and vomiting.   Genitourinary:  Negative for difficulty urinating, dyspareunia, dysuria, frequency, hematuria, pelvic pain, urgency, vaginal bleeding, vaginal discharge and vaginal pain.   Musculoskeletal:  Negative for back pain and neck pain.   Skin: Negative.    Allergic/Immunologic: Negative.    Neurological:  Negative for weakness and headaches.   Hematological:  Negative for adenopathy.   Psychiatric/Behavioral: Negative.           Objective:      /90 (BP Location: Left arm, Patient Position: Sitting, Cuff Size: Standard)   Pulse 76   Ht 5' 7\" (1.702 m)   Wt 70.3 kg (155 lb)   LMP  (LMP " Unknown)   SpO2 98%   BMI 24.28 kg/m²          Physical Exam  Vitals and nursing note reviewed.   Constitutional:       Appearance: Normal appearance. She is well-developed.   HENT:      Head: Normocephalic and atraumatic.   Eyes:      General:         Right eye: No discharge.         Left eye: No discharge.   Neck:      Thyroid: No thyromegaly.      Trachea: Trachea normal.   Cardiovascular:      Rate and Rhythm: Normal rate and regular rhythm.      Heart sounds: Normal heart sounds.   Pulmonary:      Effort: Pulmonary effort is normal.      Breath sounds: Normal breath sounds.   Chest:   Breasts:     Breasts are symmetrical.      Right: Normal. No inverted nipple, mass, nipple discharge, skin change or tenderness.      Left: Normal. No inverted nipple, mass, nipple discharge, skin change or tenderness.   Abdominal:      Palpations: Abdomen is soft.   Genitourinary:     General: Normal vulva.      Exam position: Lithotomy position.      Labia:         Right: No rash, tenderness, lesion or injury.         Left: No rash, tenderness, lesion or injury.       Urethra: No prolapse, urethral pain, urethral swelling or urethral lesion.      Vagina: Normal. No signs of injury and foreign body. No vaginal discharge, erythema, tenderness or bleeding.      Cervix: Normal.      Uterus: Normal.       Adnexa:         Right: No mass, tenderness or fullness.          Left: No mass, tenderness or fullness.        Rectum: No external hemorrhoid.      Comments: Premarin wiped from cervix to be able to obtain pap.   Musculoskeletal:         General: Normal range of motion.      Cervical back: Normal range of motion and neck supple.   Lymphadenopathy:      Head:      Right side of head: No submental, submandibular or tonsillar adenopathy.      Left side of head: No submental, submandibular or tonsillar adenopathy.      Cervical: No cervical adenopathy.      Upper Body:      Right upper body: No supraclavicular or axillary adenopathy.       Left upper body: No supraclavicular or axillary adenopathy.      Lower Body: No right inguinal adenopathy. No left inguinal adenopathy.   Skin:     General: Skin is warm and dry.   Neurological:      Mental Status: She is alert and oriented to person, place, and time.   Psychiatric:         Mood and Affect: Mood normal.         Behavior: Behavior normal.

## 2024-09-05 ENCOUNTER — ANNUAL EXAM (OUTPATIENT)
Dept: OBGYN CLINIC | Facility: MEDICAL CENTER | Age: 59
End: 2024-09-05
Payer: COMMERCIAL

## 2024-09-05 VITALS
WEIGHT: 155 LBS | BODY MASS INDEX: 24.33 KG/M2 | HEART RATE: 76 BPM | OXYGEN SATURATION: 98 % | HEIGHT: 67 IN | DIASTOLIC BLOOD PRESSURE: 90 MMHG | SYSTOLIC BLOOD PRESSURE: 126 MMHG

## 2024-09-05 DIAGNOSIS — Z12.31 ENCOUNTER FOR SCREENING MAMMOGRAM FOR BREAST CANCER: ICD-10-CM

## 2024-09-05 DIAGNOSIS — Z01.419 ENCNTR FOR GYN EXAM (GENERAL) (ROUTINE) W/O ABN FINDINGS: Primary | ICD-10-CM

## 2024-09-05 DIAGNOSIS — N95.2 VAGINAL ATROPHY: ICD-10-CM

## 2024-09-05 DIAGNOSIS — Z12.4 ENCOUNTER FOR PAPANICOLAOU SMEAR FOR CERVICAL CANCER SCREENING: ICD-10-CM

## 2024-09-05 PROCEDURE — 99396 PREV VISIT EST AGE 40-64: CPT | Performed by: NURSE PRACTITIONER

## 2024-09-05 PROCEDURE — G0476 HPV COMBO ASSAY CA SCREEN: HCPCS | Performed by: NURSE PRACTITIONER

## 2024-09-05 PROCEDURE — G0145 SCR C/V CYTO,THINLAYER,RESCR: HCPCS | Performed by: NURSE PRACTITIONER

## 2024-09-05 RX ORDER — CONJUGATED ESTROGENS 0.62 MG/G
0.5 CREAM VAGINAL 2 TIMES WEEKLY
Qty: 30 G | Refills: 1 | Status: SHIPPED | OUTPATIENT
Start: 2024-09-05

## 2024-09-05 NOTE — PATIENT INSTRUCTIONS
Calcium 1200-1500mg (in divided doses-max 600 mg at one time) + 600-1000 IU Vit D daily.   Exercise 150-300 minutes per week minimum including weight bearing exercises.   Pap with high risk HPV Q 5 years, if normal.  Collected today.   Call your insurance company to verify coverage prior to completing any ordered tests.   Annual mammogram ordered and monthly breast self exam recommended.    Colonoscopy- Due 2027         Kegels 20 times twice daily.   Silicone based lubricant with sex. (Use water based lubricant with condoms or sexual toys.)    Vaginal moisturizers twice weekly as needed.   Premarin refilled as requested and sent to pharmacy on file. Use as directed. Aware of benefits, risks and alternatives.   Return to office in one year or sooner, if needed.

## 2024-09-06 ENCOUNTER — VBI (OUTPATIENT)
Dept: ADMINISTRATIVE | Facility: OTHER | Age: 59
End: 2024-09-06

## 2024-09-06 NOTE — TELEPHONE ENCOUNTER
09/06/24 10:14 AM     Chart reviewed for Pap Smear (HPV) aka Cervical Cancer Screening was/were not submitted to the patient's insurance.     Noreen Lake MA   [facility]

## 2024-09-11 LAB
LAB AP GYN PRIMARY INTERPRETATION: NORMAL
Lab: NORMAL

## 2024-09-30 DIAGNOSIS — E78.2 MIXED HYPERLIPIDEMIA: ICD-10-CM

## 2024-09-30 RX ORDER — ROSUVASTATIN CALCIUM 20 MG/1
20 TABLET, COATED ORAL DAILY
Qty: 90 TABLET | Refills: 1 | Status: SHIPPED | OUTPATIENT
Start: 2024-09-30

## 2024-10-11 ENCOUNTER — VBI (OUTPATIENT)
Dept: ADMINISTRATIVE | Facility: OTHER | Age: 59
End: 2024-10-11

## 2024-10-11 NOTE — TELEPHONE ENCOUNTER
10/11/24 10:00 AM     Chart reviewed for Cervical Cancer Screening was/were submitted to the patient's insurance.     Noreen Lake MA   [facility]

## 2024-10-15 DIAGNOSIS — Z00.6 ENCOUNTER FOR EXAMINATION FOR NORMAL COMPARISON OR CONTROL IN CLINICAL RESEARCH PROGRAM: ICD-10-CM

## 2024-10-24 DIAGNOSIS — I10 ESSENTIAL HYPERTENSION: ICD-10-CM

## 2024-10-24 DIAGNOSIS — N95.2 VAGINAL ATROPHY: ICD-10-CM

## 2024-10-24 RX ORDER — LISINOPRIL 10 MG/1
10 TABLET ORAL DAILY
Qty: 100 TABLET | Refills: 0 | Status: SHIPPED | OUTPATIENT
Start: 2024-10-24

## 2024-10-24 RX ORDER — CONJUGATED ESTROGENS 0.62 MG/G
0.5 CREAM VAGINAL 2 TIMES WEEKLY
Qty: 30 G | Refills: 0 | Status: SHIPPED | OUTPATIENT
Start: 2024-10-24

## 2024-10-25 ENCOUNTER — APPOINTMENT (OUTPATIENT)
Dept: LAB | Facility: CLINIC | Age: 59
End: 2024-10-25
Payer: COMMERCIAL

## 2024-10-25 DIAGNOSIS — Z00.6 ENCOUNTER FOR EXAMINATION FOR NORMAL COMPARISON OR CONTROL IN CLINICAL RESEARCH PROGRAM: ICD-10-CM

## 2024-10-25 DIAGNOSIS — E78.2 MIXED HYPERLIPIDEMIA: ICD-10-CM

## 2024-10-25 LAB
ALBUMIN SERPL BCG-MCNC: 4.3 G/DL (ref 3.5–5)
ALP SERPL-CCNC: 87 U/L (ref 34–104)
ALT SERPL W P-5'-P-CCNC: 26 U/L (ref 7–52)
ANION GAP SERPL CALCULATED.3IONS-SCNC: 9 MMOL/L (ref 4–13)
AST SERPL W P-5'-P-CCNC: 34 U/L (ref 13–39)
BILIRUB SERPL-MCNC: 0.74 MG/DL (ref 0.2–1)
BUN SERPL-MCNC: 9 MG/DL (ref 5–25)
CALCIUM SERPL-MCNC: 8.7 MG/DL (ref 8.4–10.2)
CHLORIDE SERPL-SCNC: 101 MMOL/L (ref 96–108)
CHOLEST SERPL-MCNC: 237 MG/DL
CO2 SERPL-SCNC: 28 MMOL/L (ref 21–32)
CREAT SERPL-MCNC: 0.65 MG/DL (ref 0.6–1.3)
GFR SERPL CREATININE-BSD FRML MDRD: 98 ML/MIN/1.73SQ M
GLUCOSE P FAST SERPL-MCNC: 95 MG/DL (ref 65–99)
HDLC SERPL-MCNC: 57 MG/DL
LDLC SERPL CALC-MCNC: 127 MG/DL (ref 0–100)
NONHDLC SERPL-MCNC: 180 MG/DL
POTASSIUM SERPL-SCNC: 4 MMOL/L (ref 3.5–5.3)
PROT SERPL-MCNC: 7 G/DL (ref 6.4–8.4)
SODIUM SERPL-SCNC: 138 MMOL/L (ref 135–147)
TRIGL SERPL-MCNC: 267 MG/DL

## 2024-10-25 PROCEDURE — 36415 COLL VENOUS BLD VENIPUNCTURE: CPT

## 2024-10-25 PROCEDURE — 80053 COMPREHEN METABOLIC PANEL: CPT

## 2024-10-25 PROCEDURE — 80061 LIPID PANEL: CPT

## 2024-10-27 DIAGNOSIS — E78.2 MIXED HYPERLIPIDEMIA: Primary | ICD-10-CM

## 2024-10-27 DIAGNOSIS — Z82.49 FAMILY HISTORY OF HEART DISEASE: ICD-10-CM

## 2024-11-29 ENCOUNTER — TELEPHONE (OUTPATIENT)
Dept: GASTROENTEROLOGY | Facility: CLINIC | Age: 59
End: 2024-11-29

## 2024-11-29 NOTE — TELEPHONE ENCOUNTER
Patients GI provider:  Dr. Glass    Number to return call: [phone]    Reason for call: Pt returning call. Patient is asking for a reason why she would need  CBC done. Patient stated she was at the office a month ago and had the lipid panel and cmp completed. Patient also asked if Dr. Glass could use those labs and if not to please place an order. Please reach out to patient, thank you.    Scheduled procedure/appointment date if applicable: Apt/procedure 12/4/2024

## 2024-12-02 NOTE — TELEPHONE ENCOUNTER
Pt called and asked if she still needs BW done as she had BW done in Oct. She asked to please send a message to her My chart if needed and also to place the order as she will have to have it done tomorrow.

## 2024-12-04 ENCOUNTER — APPOINTMENT (OUTPATIENT)
Dept: LAB | Facility: HOSPITAL | Age: 59
End: 2024-12-04
Attending: INTERNAL MEDICINE
Payer: COMMERCIAL

## 2024-12-04 ENCOUNTER — TREATMENT (OUTPATIENT)
Dept: GASTROENTEROLOGY | Facility: CLINIC | Age: 59
End: 2024-12-04

## 2024-12-04 ENCOUNTER — OFFICE VISIT (OUTPATIENT)
Dept: GASTROENTEROLOGY | Facility: CLINIC | Age: 59
End: 2024-12-04
Payer: COMMERCIAL

## 2024-12-04 VITALS
BODY MASS INDEX: 24.3 KG/M2 | HEIGHT: 67 IN | SYSTOLIC BLOOD PRESSURE: 119 MMHG | OXYGEN SATURATION: 98 % | DIASTOLIC BLOOD PRESSURE: 78 MMHG | TEMPERATURE: 97.3 F | WEIGHT: 154.8 LBS | HEART RATE: 89 BPM

## 2024-12-04 DIAGNOSIS — K21.9 GASTROESOPHAGEAL REFLUX DISEASE WITHOUT ESOPHAGITIS: Primary | ICD-10-CM

## 2024-12-04 DIAGNOSIS — R13.19 ESOPHAGEAL DYSPHAGIA: ICD-10-CM

## 2024-12-04 DIAGNOSIS — K27.9 PEPTIC ULCER DISEASE: Primary | ICD-10-CM

## 2024-12-04 LAB
BASOPHILS # BLD AUTO: 0.03 THOUSANDS/ÂΜL (ref 0–0.1)
BASOPHILS NFR BLD AUTO: 1 % (ref 0–1)
EOSINOPHIL # BLD AUTO: 0.22 THOUSAND/ÂΜL (ref 0–0.61)
EOSINOPHIL NFR BLD AUTO: 4 % (ref 0–6)
ERYTHROCYTE [DISTWIDTH] IN BLOOD BY AUTOMATED COUNT: 13.4 % (ref 11.6–15.1)
HCT VFR BLD AUTO: 43.1 % (ref 34.8–46.1)
HGB BLD-MCNC: 13.6 G/DL (ref 11.5–15.4)
IMM GRANULOCYTES # BLD AUTO: 0.02 THOUSAND/UL (ref 0–0.2)
IMM GRANULOCYTES NFR BLD AUTO: 0 % (ref 0–2)
LYMPHOCYTES # BLD AUTO: 1.66 THOUSANDS/ÂΜL (ref 0.6–4.47)
LYMPHOCYTES NFR BLD AUTO: 27 % (ref 14–44)
MCH RBC QN AUTO: 29.6 PG (ref 26.8–34.3)
MCHC RBC AUTO-ENTMCNC: 31.6 G/DL (ref 31.4–37.4)
MCV RBC AUTO: 94 FL (ref 82–98)
MONOCYTES # BLD AUTO: 0.43 THOUSAND/ÂΜL (ref 0.17–1.22)
MONOCYTES NFR BLD AUTO: 7 % (ref 4–12)
NEUTROPHILS # BLD AUTO: 3.85 THOUSANDS/ÂΜL (ref 1.85–7.62)
NEUTS SEG NFR BLD AUTO: 61 % (ref 43–75)
NRBC BLD AUTO-RTO: 0 /100 WBCS
PLATELET # BLD AUTO: 280 THOUSANDS/UL (ref 149–390)
PMV BLD AUTO: 11 FL (ref 8.9–12.7)
RBC # BLD AUTO: 4.59 MILLION/UL (ref 3.81–5.12)
WBC # BLD AUTO: 6.21 THOUSAND/UL (ref 4.31–10.16)

## 2024-12-04 PROCEDURE — 99214 OFFICE O/P EST MOD 30 MIN: CPT | Performed by: INTERNAL MEDICINE

## 2024-12-04 PROCEDURE — 85025 COMPLETE CBC W/AUTO DIFF WBC: CPT

## 2024-12-04 PROCEDURE — 36415 COLL VENOUS BLD VENIPUNCTURE: CPT

## 2024-12-04 RX ORDER — PANTOPRAZOLE SODIUM 40 MG/1
40 TABLET, DELAYED RELEASE ORAL DAILY
Qty: 31 TABLET | Refills: 6 | Status: SHIPPED | OUTPATIENT
Start: 2024-12-04

## 2024-12-05 NOTE — PROGRESS NOTES
[facility] - Outpatient Follow-up Note  Argenis Cadena 59 y.o. female MRN: [id number]  Encounter: [id number]          ASSESSMENT AND PLAN:      1. Gastroesophageal reflux disease without esophagitis (Primary)  -No lying down within 1 hour of snacking or eating  -No recommendation for daily medications since her heartburn symptoms are occurring only 2 times monthly.  -Continue using over-the-counter antacid as needed    2. Esophageal dysphagia  - pantoprazole (PROTONIX) 40 mg tablet; Take 1 tablet (40 mg total) by mouth daily  Dispense: 31 tablet; Refill: 6  -Schedule EGD with dilation    3.  History of iron deficiency anemia  -CBC on 3/2/2024 demonstrated hemoglobin level 13.1 with an MCV of 94.  -CBC on 10/9/2023 demonstrated hemoglobin level 10.8 with a MCV of 92.  ______________________________________________________________________    SUBJECTIVE: Nimisha comes the office today with a complaint of recurring episodes of dysphagia to solid foods requiring her to drink additional amounts of water.  The additional water will help the food pass.  These episodes are occurring 1-2 times per month.  She admits to heartburn symptoms also occurring approximately 2 times per month that has been relieved with over-the-counter antacids.  She denies any diarrhea, constipation, rectal bleeding, melena, nausea, vomiting, bloating, gaseousness, hiccups, or burping.      A CT scan obtained on 9/2/2023 demonstrated gastric antral thickening which may represent gastritis versus a gastric antral mass.  Endoscopy was recommended for further evaluation.  An EGD performed on 9/2/2023 revealed a normal esophagus in addition to a cratered ulcer in the prepyloric area that was treated with proton pump inhibitor.  Biopsies were negative for Helicobacter pylori.  EGD performed 12/1/2023 revealed a normal-appearing esophagus.  Biopsies of the stomach were obtained and were negative for Helicobacter  pylori.    Colonoscopy was last performed on 2019 and it showed internal hemorrhoids, nonbleeding as well as a polyp at the rectosigmoid junction which was removed.      Historical Information   Past Medical History:   Diagnosis Date    Abnormal Pap smear of cervix     Acute blood loss anemia 2023    Acute prepyloric ulcer 2023    Bilateral otitis media with effusion 3/11/2024    Dysfunction of both eustachian tubes 3/11/2024    Hearing loss     Hyperlipemia     Hypertension     Melena 2023    SIRS (systemic inflammatory response syndrome) (HCC) 2023    Urinary tract infection      Past Surgical History:   Procedure Laterality Date    APPENDECTOMY      KNEE SURGERY       Social History   Social History     Substance and Sexual Activity   Alcohol Use Yes    Alcohol/week: 2.0 standard drinks of alcohol    Types: 2 Glasses of wine per week     Social History     Substance and Sexual Activity   Drug Use No     Social History     Tobacco Use   Smoking Status Former    Current packs/day: 0.00    Types: Cigarettes    Quit date: 1980    Years since quittin.5   Smokeless Tobacco Never   Tobacco Comments    never a heavy user; social smoker     Family History   Problem Relation Age of Onset    Heart disease Mother     Diabetes Mother     Kidney disease Mother     Angina Mother     Coronary artery disease Mother     Hyperlipidemia Mother     Dialysis Mother     Stroke Mother     Heart disease Father     Diabetes Father     Prostate cancer Father 68    Coronary artery disease Father     Cancer Father         bladder cancer    Hyperlipidemia Father     No Known Problems Sister     No Known Problems Maternal Grandmother     Heart disease Paternal Grandmother     Coronary artery disease Brother     Diabetes Brother     Heart disease Brother     Hyperlipidemia Brother     No Known Problems Paternal Aunt     No Known Problems Paternal Aunt     No Known Problems Paternal Aunt     Breast cancer Neg Hx   "   Colon cancer Neg Hx     Ovarian cancer Neg Hx     Cervical cancer Neg Hx     Uterine cancer Neg Hx        Meds/Allergies       Current Outpatient Medications:     estrogens, conjugated (Premarin) vaginal cream    lisinopril (ZESTRIL) 10 mg tablet    pantoprazole (PROTONIX) 40 mg tablet    rosuvastatin (CRESTOR) 20 MG tablet    Allergies   Allergen Reactions    Penicillins Rash           Objective     Blood pressure 119/78, pulse 89, temperature (!) 97.3 °F (36.3 °C), temperature source Temporal, height 5' 7\" (1.702 m), weight 70.2 kg (154 lb 12.8 oz), SpO2 98%. Body mass index is 24.25 kg/m².      PHYSICAL EXAM:      General Appearance:   Alert, cooperative, no distress   HEENT:   Normocephalic, atraumatic, anicteric.     Neck:  Supple, symmetrical, trachea midline   Lungs:   Clear to auscultation bilaterally; no rales, rhonchi or wheezing; respirations unlabored    Heart::   Regular rate and rhythm; no murmur, rub, or gallop.   Abdomen:   Soft, non-tender, non-distended; normal bowel sounds; no masses, no organomegaly    Genitalia:   Deferred    Rectal:   Deferred    Extremities:  No cyanosis, clubbing or edema    Pulses:  2+ and symmetric    Skin:  No jaundice, rashes, or lesions    Lymph nodes:  No palpable cervical lymphadenopathy        Lab Results:   Appointment on 12/04/2024   Component Date Value    WBC 12/04/2024 6.21     RBC 12/04/2024 4.59     Hemoglobin 12/04/2024 13.6     Hematocrit 12/04/2024 43.1     MCV 12/04/2024 94     MCH 12/04/2024 29.6     MCHC 12/04/2024 31.6     RDW 12/04/2024 13.4     MPV 12/04/2024 11.0     Platelets 12/04/2024 280     nRBC 12/04/2024 0     Segmented % 12/04/2024 61     Immature Grans % 12/04/2024 0     Lymphocytes % 12/04/2024 27     Monocytes % 12/04/2024 7     Eosinophils Relative 12/04/2024 4     Basophils Relative 12/04/2024 1     Absolute Neutrophils 12/04/2024 3.85     Absolute Immature Grans 12/04/2024 0.02     Absolute Lymphocytes 12/04/2024 1.66     Absolute " Monocytes 12/04/2024 0.43     Eosinophils Absolute 12/04/2024 0.22     Basophils Absolute 12/04/2024 0.03          Radiology Results:   No results found.

## 2024-12-12 LAB
APOB+LDLR+PCSK9 GENE MUT ANL BLD/T: ABNORMAL
BRCA1+BRCA2 DEL+DUP + FULL MUT ANL BLD/T: NOT DETECTED
GENE DIS ANL INTERP-IMP: POSITIVE
MLH1+MSH2+MSH6+PMS2 GN DEL+DUP+FUL M: NOT DETECTED

## 2024-12-13 ENCOUNTER — RESULTS FOLLOW-UP (OUTPATIENT)
Dept: LAB | Facility: HOSPITAL | Age: 59
End: 2024-12-13

## 2024-12-13 NOTE — TELEPHONE ENCOUNTER
12/13/2024: Left a voicemail for Nimisha regarding results from the [employer] research study. First Attempt.

## 2024-12-26 ENCOUNTER — TELEPHONE (OUTPATIENT)
Dept: OTHER | Facility: HOSPITAL | Age: 59
End: 2024-12-26

## 2024-12-26 DIAGNOSIS — R89.8 ABNORMAL GENETIC TEST: Primary | ICD-10-CM

## 2025-01-29 ENCOUNTER — OFFICE VISIT (OUTPATIENT)
Dept: CARDIOLOGY CLINIC | Facility: MEDICAL CENTER | Age: 60
End: 2025-01-29
Payer: COMMERCIAL

## 2025-01-29 VITALS
WEIGHT: 153 LBS | DIASTOLIC BLOOD PRESSURE: 74 MMHG | HEART RATE: 74 BPM | SYSTOLIC BLOOD PRESSURE: 114 MMHG | OXYGEN SATURATION: 98 % | BODY MASS INDEX: 24.01 KG/M2 | HEIGHT: 67 IN

## 2025-01-29 DIAGNOSIS — I10 PRIMARY HYPERTENSION: Primary | ICD-10-CM

## 2025-01-29 DIAGNOSIS — Z82.49 FAMILY HISTORY OF HEART DISEASE: ICD-10-CM

## 2025-01-29 DIAGNOSIS — E78.01 FAMILIAL HYPERCHOLESTEREMIA: ICD-10-CM

## 2025-01-29 DIAGNOSIS — E78.2 MIXED HYPERLIPIDEMIA: ICD-10-CM

## 2025-01-29 PROCEDURE — 93000 ELECTROCARDIOGRAM COMPLETE: CPT | Performed by: INTERNAL MEDICINE

## 2025-01-29 PROCEDURE — 99204 OFFICE O/P NEW MOD 45 MIN: CPT | Performed by: INTERNAL MEDICINE

## 2025-01-29 RX ORDER — ROSUVASTATIN CALCIUM 40 MG/1
40 TABLET, COATED ORAL DAILY
Qty: 90 TABLET | Refills: 3 | Status: SHIPPED | OUTPATIENT
Start: 2025-01-29

## 2025-01-29 NOTE — PROGRESS NOTES
Cardiology Consultation     Argenis Cadena  [id number]  1965  [facility] [place]  [facility] [place] [address]      Diagnoses and all orders for this visit:    Primary hypertension  -     Stress test only, exercise; Future    Mixed hyperlipidemia  -     Ambulatory referral to Cardiology  -     POCT ECG  -     rosuvastatin (CRESTOR) 40 MG tablet; Take 1 tablet (40 mg total) by mouth daily  -     Stress test only, exercise; Future    Family history of heart disease  -     Ambulatory referral to Cardiology  -     Stress test only, exercise; Future  -     LDL cholesterol, direct; Future  -     TSH, 3rd generation with Free T4 reflex; Future    Familial hypercholesteremia  -     Stress test only, exercise; Future  -     LDL cholesterol, direct; Future  -     TSH, 3rd generation with Free T4 reflex; Future        I had the pleasure of seeing Argenis Cadena for a consultation regarding high cholesterol and FH gene positive requested by     History of the Presenting Illness, Discussion/Summary and my Plan are as follows:::    Argenis is a pleasant 59-year-old lady with hypertension and dyslipidemia-with a lifelong elevation and recently positive for family hypercholesterolemia-ApoB heterozygosity.    She also has a history of GI bleeding-upper GI bleeding with a prepyloric ulcer in  at which time she was taking more ibuprofen as well as some champagne, presented with a hemoglobin of 7 with a baseline around 12-13.    She smoked probably 1 to 2 pack years, quit in , alcohol currently is about 2 to 3 glasses of wine a day.    Family history father had a brain bleed, had hypertension, diabetes and was a smoker,  at 72, mother had angina in her 40s, ultimately developed end-stage renal disease and went on dialysis, also had multiple vascular stenting procedures.    She has 3 siblings a brother  who is 7 years older who had a CABG at 59, also diabetic, another brother who is here and a half older who had CABG at the age of 57, was a non-smoker nondiabetic but was found to be gene positive.  She also has a sister who is 3 years older and well.    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    Activity wise she walks for 30 minutes a day but not regularly, does not exercise per se, weight has been more or less stable but in the long-term had been gaining a few pounds.  BMI is 24    Typically has toast or eggs for breakfast, sandwich for lunch, protein with veggie for dinner.  Overall intake of butter cheese and cream are low, meat is about 3 times a week, fishes about once a week and tries to avoid shellfish and shrimp    Apparently her cholesterol was in the 400s prior to initiation of statin therapy, somewhere in her 30s, I do not have these levels.  She did have a calcium score in 2008 that was 0.    Normal cardiac exam.  Normal carotids, does have a small nodule in the right eyelid although does not appear to be xanthelasma.  No tendon xanthomas.    Recent vascular screening-September 2024 had mild left carotid disease, AAA negative    Plan:    Familial hypercholesterolemia: Likely heterozygous,-ApoB gene positive.  Due to a lifelong elevation in cholesterol, will need further aggressive reduction.  Currently her calculated LDL is 127, non-HDL is 180  For starters I gave her 2 options-1 would be to increase her rosuvastatin to 40 mg versus addition of ezetimibe 10 mg  Which would cause a further 20% reduction.  She would start with increasing rosuvastatin to 40 mg as well as institute further lifestyle changes and recheck a lipid profile with a direct LDL as well as TSH in 3 months    Saturated fat intake in the form of meat could be cut down further, can add more shrimp or shellfish as a substitute as well.  Increased activity  level and a regular exercise program will help.  Due to her family history and a lifelong elevation of cholesterol, will check  a regular exercise treadmill stress test.    Further information was also provided    Follow-up in 3 months         Latest Reference Range & Units 12/31/19 08:12 10/30/20 08:21 10/20/21 08:39 10/14/22 08:54 03/02/24 08:25 10/25/24 09:13   Cholesterol See Comment mg/dL 245 (H) 248 (H) 242 (H) 246 (H) 243 (H) 237 (H)   Triglycerides See Comment mg/dL 87 120 101 143 193 (H) 267 (H)   HDL >=50 mg/dL 66 70 73 66 61 57   Non-HDL Cholesterol mg/dl 179 178 169 180 182 180   LDL Calculated 0 - 100 mg/dL 162 (H) 154 (H) 149 (H) 151 (H) 143 (H) 127 (H)   (H): Data is abnormally high    Component 04/14/18 03/15/17 08/18/16 03/07/15 12/24/13 02/25/13   Total Cholesterol 202 High   260 High  208 High  232 High   216 High   229 High     Triglycerides 83  94 84 105  86  87    HDL Cholesterol 58  69 High  70 High  62 High   61 High   59    Non-HDL Chol (Calc) 144  191 High   138  170 High   -- --   LDL Cholesterol 127  172 High   121  149 High   138 High   153 High        1. Primary hypertension  Stress test only, exercise      2. Mixed hyperlipidemia  Ambulatory referral to Cardiology    POCT ECG    rosuvastatin (CRESTOR) 40 MG tablet    Stress test only, exercise      3. Family history of heart disease  Ambulatory referral to Cardiology    Stress test only, exercise    LDL cholesterol, direct    TSH, 3rd generation with Free T4 reflex      4. Familial hypercholesteremia  Stress test only, exercise    LDL cholesterol, direct    TSH, 3rd generation with Free T4 reflex        Patient Active Problem List   Diagnosis    Allergic rhinitis    Primary hypertension    Mixed hyperlipidemia    Post-menopausal    Bilateral hearing loss    UTI symptoms     Past Medical History:   Diagnosis Date    Abnormal Pap smear of cervix     Acute blood loss anemia 9/1/2023    Acute prepyloric ulcer 9/2/2023    Bilateral otitis  media with effusion 3/11/2024    Dysfunction of both eustachian tubes 3/11/2024    Hearing loss     Hyperlipemia     Hypertension     Melena 2023    SIRS (systemic inflammatory response syndrome) ([facility]) 2023    Urinary tract infection      Social History     Socioeconomic History    Marital status: /Civil Union     Spouse name: Not on file    Number of children: Not on file    Years of education: Not on file    Highest education level: Not on file   Occupational History    Not on file   Tobacco Use    Smoking status: Former     Current packs/day: 0.00     Types: Cigarettes     Quit date: 1980     Years since quittin.6    Smokeless tobacco: Never    Tobacco comments:     never a heavy user; social smoker   Vaping Use    Vaping status: Former   Substance and Sexual Activity    Alcohol use: Yes     Alcohol/week: 2.0 standard drinks of alcohol     Types: 2 Glasses of wine per week    Drug use: No    Sexual activity: Yes     Partners: Male     Birth control/protection: Post-menopausal   Other Topics Concern    Not on file   Social History Narrative    Works in Finance at [employer]      Social Drivers of Health     Financial Resource Strain: Not on file   Food Insecurity: Not on file   Transportation Needs: Not on file   Physical Activity: Not on file   Stress: Not on file   Social Connections: Unknown (2024)    Received from [employer]    Social Connections     How often do you feel lonely or isolated from those around you? (Adult - for ages 18 years and over): Not on file   Intimate Partner Violence: Not on file   Housing Stability: Not on file      Family History   Problem Relation Age of Onset    Heart disease Mother     Diabetes Mother     Kidney disease Mother     Angina Mother     Coronary artery disease Mother     Hyperlipidemia Mother     Dialysis Mother     Stroke Mother     Heart disease Father     Diabetes Father     Prostate cancer Father 68    Coronary artery  "disease Father     Cancer Father         bladder cancer    Hyperlipidemia Father     No Known Problems Sister     No Known Problems Maternal Grandmother     Heart disease Paternal Grandmother     Coronary artery disease Brother     Diabetes Brother     Heart disease Brother     Hyperlipidemia Brother     No Known Problems Paternal Aunt     No Known Problems Paternal Aunt     No Known Problems Paternal Aunt     Breast cancer Neg Hx     Colon cancer Neg Hx     Ovarian cancer Neg Hx     Cervical cancer Neg Hx     Uterine cancer Neg Hx      Past Surgical History:   Procedure Laterality Date    APPENDECTOMY      KNEE SURGERY         Current Outpatient Medications:     estrogens, conjugated (Premarin) vaginal cream, Insert 0.5 g into the vagina 2 (two) times a week, Disp: 30 g, Rfl: 0    lisinopril (ZESTRIL) 10 mg tablet, Take 1 tablet (10 mg total) by mouth daily, Disp: 100 tablet, Rfl: 0    rosuvastatin (CRESTOR) 40 MG tablet, Take 1 tablet (40 mg total) by mouth daily, Disp: 90 tablet, Rfl: 3    pantoprazole (PROTONIX) 40 mg tablet, Take 1 tablet (40 mg total) by mouth daily (Patient not taking: Reported on 1/29/2025), Disp: 31 tablet, Rfl: 6  Allergies   Allergen Reactions    Penicillins Rash     Vitals:    01/29/25 0832 01/29/25 0919   BP: 136/82 114/74   BP Location: Left arm Left arm   Patient Position: Sitting    Cuff Size: Adult    Pulse: 74    SpO2: 98%    Weight: 69.4 kg (153 lb)    Height: 5' 7\" (1.702 m)          Imaging: No results found.    Review of Systems:  Review of Systems   Constitutional: Negative.    HENT: Negative.     Eyes: Negative.    Respiratory: Negative.     Cardiovascular: Negative.    Endocrine: Negative.    Musculoskeletal: Negative.        Physical Exam:    /74 (BP Location: Left arm)   Pulse 74   Ht 5' 7\" (1.702 m)   Wt 69.4 kg (153 lb)   LMP  (LMP Unknown)   SpO2 98%   BMI 23.96 kg/m²   Physical Exam  Constitutional:       General: She is not in acute distress.     " "Appearance: She is not ill-appearing, toxic-appearing or diaphoretic.   HENT:      Nose: Nose normal. No congestion or rhinorrhea.   Neck:      Vascular: No carotid bruit.   Cardiovascular:      Rate and Rhythm: Normal rate and regular rhythm.      Pulses: Normal pulses.      Heart sounds: No murmur heard.     No friction rub. No gallop.   Pulmonary:      Effort: Pulmonary effort is normal. No respiratory distress.      Breath sounds: No stridor. No wheezing or rhonchi.   Abdominal:      General: Abdomen is flat. Bowel sounds are normal. There is no distension.      Palpations: There is no mass.      Tenderness: There is no abdominal tenderness.      Hernia: No hernia is present.   Musculoskeletal:         General: No swelling, tenderness, deformity or signs of injury. Normal range of motion.      Cervical back: Normal range of motion. No rigidity or tenderness.   Lymphadenopathy:      Cervical: No cervical adenopathy.   Skin:     General: Skin is warm.      Coloration: Skin is not jaundiced or pale.      Findings: No bruising or erythema.   Neurological:      Mental Status: She is alert.            This note was completed in part utilizing [employer] direct voice recognition software.   Grammatical errors, random word insertion, spelling mistakes, occasional wrong word or \"sound-alike\" substitutions and incomplete sentences may be an occasional consequence of the system secondary to software limitations, ambient noise and hardware issues. At the time of dictation, efforts were made to edit, clarify and /or correct errors.  Please read the chart carefully and recognize, using context, where substitutions have occurred.  If you have any questions or concerns about the context, text or information contained within the body of this dictation, please contact myself, the provider, for further clarification.  "

## 2025-01-29 NOTE — PATIENT INSTRUCTIONS
"   Therapeutic lifestyle changes were discussed with the patient- Specifically:  1.  Decreasing saturated fat intake to less than 13 g per day. I showed the pt how to look at a food label.  Watch intake of cheese, red meat, cream and butter containing foods  2.  Increase soluble fiber in the diet-beans, pears, oatmeal  3.  Weight loss of even 5-10 pounds will also help to lower cholesterol levels.  Decreasing caloric intake by about 100 cecelia a day-should lead to a weight loss of 1-2 pounds over one month  4.  Increase aerobic physical activity - ultimate goal of 150 min per week. Start low and increase level and duration of activity slowly  5. Google 'TLC Cholesterol\" = Your guide to lowering your cholesterol with TLC is probably the best online resource to lower your LDL cholesterol  6. I showed the patient how to look at a food label.    "

## 2025-02-05 ENCOUNTER — HOSPITAL ENCOUNTER (OUTPATIENT)
Dept: NON INVASIVE DIAGNOSTICS | Facility: HOSPITAL | Age: 60
Discharge: HOME | End: 2025-02-05
Payer: COMMERCIAL

## 2025-02-05 VITALS
WEIGHT: 153 LBS | HEART RATE: 94 BPM | BODY MASS INDEX: 24.01 KG/M2 | HEIGHT: 67 IN | DIASTOLIC BLOOD PRESSURE: 80 MMHG | SYSTOLIC BLOOD PRESSURE: 120 MMHG

## 2025-02-05 DIAGNOSIS — E78.2 MIXED HYPERLIPIDEMIA: ICD-10-CM

## 2025-02-05 DIAGNOSIS — E78.01 FAMILIAL HYPERCHOLESTEREMIA: ICD-10-CM

## 2025-02-05 DIAGNOSIS — I10 PRIMARY HYPERTENSION: ICD-10-CM

## 2025-02-05 DIAGNOSIS — Z82.49 FAMILY HISTORY OF HEART DISEASE: ICD-10-CM

## 2025-02-05 LAB
ARRHY DURING EX: NORMAL
CHEST PAIN STATEMENT: NORMAL
MAX DIASTOLIC BP: 86 MMHG
MAX HR PERCENT: 103 %
MAX HR: 169 BPM
MAX PREDICTED HEART RATE: 163 BPM
PROTOCOL NAME: NORMAL
RATE PRESSURE PRODUCT: NORMAL
REASON FOR TERMINATION: NORMAL
SL CV STRESS RECOVERY BP: NORMAL MMHG
SL CV STRESS RECOVERY HR: 100 BPM
SL CV STRESS RECOVERY O2 SAT: 99 %
SL CV STRESS STAGE REACHED: 2
STRESS ANGINA INDEX: 0
STRESS BASELINE BP: NORMAL MMHG
STRESS BASELINE HR: 94 BPM
STRESS O2 SAT REST: 98 %
STRESS PEAK HR: 169 BPM
STRESS POST ESTIMATED WORKLOAD: 7 METS
STRESS POST EXERCISE DUR MIN: 4 MIN
STRESS POST EXERCISE DUR MIN: 4 MIN
STRESS POST EXERCISE DUR SEC: 0 SEC
STRESS POST EXERCISE DUR SEC: 0 SEC
STRESS POST O2 SAT PEAK: 86 %
STRESS POST PEAK BP: 160 MMHG
STRESS POST PEAK HR: 169 BPM
STRESS POST PEAK SYSTOLIC BP: 160 MMHG
TARGET HR FORMULA: NORMAL
TEST INDICATION: NORMAL

## 2025-02-05 PROCEDURE — 93018 CV STRESS TEST I&R ONLY: CPT | Performed by: INTERNAL MEDICINE

## 2025-02-05 PROCEDURE — 93017 CV STRESS TEST TRACING ONLY: CPT

## 2025-02-05 PROCEDURE — 93016 CV STRESS TEST SUPVJ ONLY: CPT | Performed by: INTERNAL MEDICINE

## 2025-02-07 ENCOUNTER — RESULTS FOLLOW-UP (OUTPATIENT)
Dept: CARDIOLOGY CLINIC | Facility: CLINIC | Age: 60
End: 2025-02-07

## 2025-02-09 DIAGNOSIS — I10 ESSENTIAL HYPERTENSION: ICD-10-CM

## 2025-02-10 RX ORDER — LISINOPRIL 10 MG/1
10 TABLET ORAL DAILY
Qty: 100 TABLET | Refills: 1 | Status: SHIPPED | OUTPATIENT
Start: 2025-02-10

## 2025-02-27 ENCOUNTER — OFFICE VISIT (OUTPATIENT)
Age: 60
End: 2025-02-27
Payer: COMMERCIAL

## 2025-02-27 VITALS — TEMPERATURE: 98 F | BODY MASS INDEX: 23.18 KG/M2 | WEIGHT: 148 LBS

## 2025-02-27 DIAGNOSIS — L72.0 EPIDERMAL INCLUSION CYST: Primary | ICD-10-CM

## 2025-02-27 PROCEDURE — 99213 OFFICE O/P EST LOW 20 MIN: CPT | Performed by: REGISTERED NURSE

## 2025-02-27 NOTE — PROGRESS NOTES
"[facility] Note     Patient Name: Argenis Cadena  Encounter Date: 2/27/25     Have you been cared for by a [facility] Dermatologist in the last 3 years and, if so, which description applies to you?    Yes.  I have been here within the last 3 years, and my medical history has NOT changed since that time.  I am FEMALE/of child-bearing potential.    REVIEW OF SYSTEMS:  Have you recently had or currently have any of the following? No changes in my recent health.   PAST MEDICAL HISTORY:  Have you personally ever had or currently have any of the following?  If \"YES,\" then please provide more detail. No changes in my medical history.   HISTORY OF IMMUNOSUPPRESSION: Do you have a history of any of the following:  Systemic Immunosuppression such as Diabetes, Biologic or Immunotherapy, Chemotherapy, Organ Transplantation, Bone Marrow Transplantation or Prednisone?  No     Answering \"YES\" requires the addition of the dotphrase \"IMMUNOSUPPRESSED\" as the first diagnosis of the patient's visit.   FAMILY HISTORY:  Any \"first degree relatives\" (parent, brother, sister, or child) with the following?    No changes in my family's known health.   PATIENT EXPERIENCE:    Do you want the Dermatologist to perform a COMPLETE skin exam today including a clinical examination under the \"bra and underwear\" areas?  NO  If necessary, do we have your permission to call and leave a detailed message on your Preferred Phone number that includes your specific medical information?  Yes      Allergies   Allergen Reactions    Penicillins Rash      Current Outpatient Medications:     estrogens, conjugated (Premarin) vaginal cream, Insert 0.5 g into the vagina 2 (two) times a week, Disp: 30 g, Rfl: 0    lisinopril (ZESTRIL) 10 mg tablet, Take 1 tablet (10 mg total) by mouth daily, Disp: 100 tablet, Rfl: 1    pantoprazole (PROTONIX) 40 mg tablet, Take 1 tablet (40 mg total) by mouth daily (Patient not taking: Reported on 1/29/2025), " Disp: 31 tablet, Rfl: 6    rosuvastatin (CRESTOR) 40 MG tablet, Take 1 tablet (40 mg total) by mouth daily, Disp: 90 tablet, Rfl: 3        Whom besides the patient is providing clinical information about today's encounter?   NO ADDITIONAL HISTORIAN (patient alone provided history)    Physical Exam and Assessment/Plan by Diagnosis:    EPIDERMAL INCLUSION CYST     Physical Exam:  Anatomic Location Affected:  left posterior shoulder, right eyelid  Morphological Description:  subcutaneous nodule  Pertinent Positives:  Pertinent Negatives:    Additional History of Present Condition:  Patient reports that this spot has been present for years. She states that it fills and drains throughout the year.    Assessment and Plan:  Based on a thorough discussion of this condition and the management approach to it (including a comprehensive discussion of the known risks, side effects and potential benefits of treatment), the patient (family) agrees to implement the following specific plan:  Reassured benign.  When the cyst becomes larger and is not inflamed, contact us through MyChart to get scheduled for an excision of the left posterior shoulder.      Scribe Attestation      I,:  Marleni Berrios MA am acting as a scribe while in the presence of the attending physician.:       I,:  Armani Dick MD personally performed the services described in this documentation    as scribed in my presence.:

## 2025-03-24 DIAGNOSIS — N95.2 VAGINAL ATROPHY: ICD-10-CM

## 2025-03-25 RX ORDER — CONJUGATED ESTROGENS 0.62 MG/G
0.5 CREAM VAGINAL 2 TIMES WEEKLY
Qty: 30 G | Refills: 0 | Status: SHIPPED | OUTPATIENT
Start: 2025-03-27

## 2025-04-02 ENCOUNTER — TELEPHONE (OUTPATIENT)
Dept: GENETICS | Facility: CLINIC | Age: 60
End: 2025-04-02

## 2025-04-02 NOTE — TELEPHONE ENCOUNTER
Scheduled Nimisha for a genetic counseling appointment to discuss her helix test results. I explained that it may show up in her MyChart as virtual but she could ignore that - her genetic counselor will be calling her at the time of her appointment.

## 2025-04-17 ENCOUNTER — TELEMEDICINE (OUTPATIENT)
Dept: PERINATAL CARE | Facility: CLINIC | Age: 60
End: 2025-04-17

## 2025-04-17 DIAGNOSIS — E78.01 AUTOSOMAL DOMINANT HYPERCHOLESTEROLEMIA ASSOCIATED WITH MUTATION IN APOB GENE: Primary | ICD-10-CM

## 2025-04-17 NOTE — PROGRESS NOTES
"[facility] Post-Test Genetic Counseling Note    Patient Name: Argenis Cadena   /Age: 1965/60 y.o.    Date of Service: 2025  Genetic Counselor: Awa Gibson MS, [facility]  Interpretation Services: None  Location: Telephone consult   Length of Visit:  12 minutes    Argenis presents to the Genetics department to discuss her [facility] genetic test result.    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    Medical and Surgical History  Pertinent surgical history:   Past Surgical History:   Procedure Laterality Date    APPENDECTOMY      KNEE SURGERY        Pertinent medical history:  Past Medical History:   Diagnosis Date    Abnormal Pap smear of cervix     Acute blood loss anemia 2023    Acute prepyloric ulcer 2023    Bilateral otitis media with effusion 3/11/2024    Dysfunction of both eustachian tubes 3/11/2024    Hearing loss     Hyperlipemia     Hypertension     Melena 2023    SIRS (systemic inflammatory response syndrome) (HCC) 2023    Urinary tract infection          LDL Cholesterol level (10/25/24): 127 mg/dL     Other History:  Height:   Ht Readings from Last 1 Encounters:   25 5' 7\" (1.702 m)     Weight:   Wt Readings from Last 1 Encounters:   25 67.1 kg (148 lb)       Relevant Cardiac Personal and Family History*   Patient had very high cholesterol levels (in the 400s) in her late 20s and was suspected to have FH based on her bloodwork and family history.  She has been on statin medications and has gotten levels down to the 100s.  She is currently following with Cardiologist doctor [place].  The patient does not have any children.  The patient's mother had angina in her 40s, ultimately developed end-stage renal disease and went on dialysis, also " had multiple vascular stenting procedures.  Nimisha has 3 siblings - an older brother who had a CABG at 59, another brother who had CABG at the age of 57 and was also found to have a pathogenic APOB variant.  She also has a sister who is 3 years older and does have high cholesterol issues.  The patient stated that her brother's children did have genetic testing for the FH and are aware of monitoring cholesterol levels.    *All history is reported as provided by the patient; records are not available for review, except where indicated.     Assessment:  Familial Hypercholesterolemia (FH) is a genetic condition that leads to atherosclerotic plaque deposition in the coronary arteries at young ages.   Levels of low-density-lipoprotein cholesterol (LDL-C) progressively worsen over time. If left untreated, it can increase the risk for coronary artery disease, myocardial infarction, xanthomas, and corneal arcus.     Coronary Artery Disease (CAD)   Among individuals with an LDL-C >190 mg/dL (>4.9 mmol/L) and a pathogenic variant in an FH-causing gene, there is a 22-fold increased risk for coronary artery disease compared to a 6-fold increase in the general population with the same LDL-C values (PMID: 16672134).     Myocardial infarction  Studies have found that approximately one in ten individuals with a history of early-onset myocardial infarction have FH, and this risk increased in the setting of a family history of CAD (PMID: 82080590).     Xanthomas   Xanthomas are cholesterol deposits in the tendons of the body due to extremely high levels of LDL-C. They can manifest in the elbow, hands knees, and feet- particularly the Achilles tendon    Some studies identified xanthomas in 30-50% of individuals with FH (PMID: 68168788, 1916530). However, more recent studies showed a lower incidence, likely due to the widespread use of statins (PMID: 86044739).    Corneal arcus   Corneal arcus is a white, gray or blue opaque ring in the  corneal margin of the eye. It is caused by abnormal deposition of lipids in the cornea secondary to long-term exposure of elevated LDL-C levels (PMID: 2223595, 20951758, 38707370, 02797575).    Studies have found corneal arcus in 7-30% of individuals with FH (PMID: 66690832, 10108459)  This feature does not resolve with treatment.     Stroke  Recent studies suggest that heterozygous pathogenic variants in FH-causing genes do not confer a significantly increased risk for ischemic stroke (PMID: 48383390, 08665798, 9964658).     Population Frequency  The incidence of hereditary familial hypercholesterolemia in the general population is estimated to be 1/250 (PMID: 30298417,51907292). However, it is estimated to be more common among the [place], [employer], [place]ian, and [place] [facility] populations (PMID: 07780967, 23007756, 6562670)    Reproductive Risks   If an individual and their partner both have at least one pathogenic variant in an FH-causing gene, there is a chance that a child would inherit a pathogenic variant in two FH-causing genes.  Individuals with pathogenic variants in two FH-causing genes have a much more severe presentation of the condition.     For individuals of reproductive age, we recommend that their partners consider FH genetic testing for reproductive purposes. Preconception genetic counseling (through [facility]) is available to review this recommendation and other options.    Risk and Testing for Family Members   Argenis and her family members have already discussed this information and have undergone testing.  There is a 50% chance that all first-degree relatives (parents, siblings, children) inherited the same pathogenic variant..  Early diagnosis and intervention for relatives at risk for FH may reduce morbidity and mortality (PMID: 28295268, 82630825).     Surveillance recommendations for individuals with this condition begin in childhood, typically  between the ages of 5-11 (PMID: 95980540, 63056283). Of note, screening recommendations may begin as early as 1 y/o among children with major CAD risk factors (diabetes mellitus, obesity), family history early-onset CAD (<46 y/o in men; <56 y/o in women), or a first-degree relative with a total cholesterol >240 mg/dL (PMID: 22592807, 62145460). Thus, individuals younger than 18 may consider genetic testing (PMID: 07422875).    Adult relatives should discuss their genetic testing options with their PCP. We recommend that [facility] PCP's order the [employer] Test 469231 (GeneSeq®: Cardio - Familial Hypercholesterolemia Panel) for relatives in the network who have not been tested through [facility].      Management  Surveillance Recommendations for Adults  Measurement of lipid levels (TC, LDL-C, HDL-C, triglycerides, lipoprotein)  Interval of follow-up testing TBD by care team based on the presence of other risk factors   Evaluate for concurrent illness (kidney disease, obstructive liver disease, acute myocardial infarction, hypothyroidism) that can affect lipid values.   Consider noninvasive imaging modalities (including echocardiogram, CT angiogram, etc) to inform treatment decision (PMID: 47611209)  Identify modifiable risk factors (smoking, sedentary behavior, hypertension, diabetes, obesity)    Agents and Circumstances to Avoid  Smoking  High intake saturated and trans unsaturated fat  Sedentary lifestyle  Obesity  Hypertension  Diabetes Mellitus     Treatment of Manifestations in Adults with FH  Management guidelines for individuals with FH have been developed by the International Lipid Foundation, American Heart Association and National Lipid Association (PMID: 23088271, 42492206, 33588161). The recommendations listed below are specific to Argenis.     These recommendations are subject to change over time and the newest guidelines should be referenced for the most up to date information.     Hyperlipidemia    Regular physical activity, weight control (if indicated), and healthy diet   -Recommended soluble fiber intake between 10-20g/day and reduced saturated/trans unsaturated fat  -LDL-C levels are typically unable to be lowered with lifestyle interventions alone.   Statin therapy to reduce elevated LDL-C levels   -Response to treatment, including assessment of liver enzymes, should be assessed one to three months after the start of therapy and periodically thereafter (PMID: 16469626)  Consider referral to lipid specialist with expertise in FH if there is difficulty reducing elevated LDL-C levels.  -Additional treatments such as ezetimibe, bile acid sequestrants, PCSK9 inhibitors, or bempedoic acid may be recommended  -More aggressive treatment may be required among individuals with risk factors such as CAD, diabetes mellitus, smoking, metabolic syndrome, and/or family history of early-onset CAD.     Cardiovascular Disease   Measure serum lipoprotein   Consider low-dose aspirin in those with a history of or high-risk for CAD and/or stroke.  Treatment for diabetes mellitus and hypertension   Consider non-invasive imaging modalities in adults   -There is insufficient research regarding the best way to incorporate subclinical atherosclerosis imaging and exercise stress testing into clinical practice (PMID: 81994062, 53678946)      Additional Genetic Testing Recommendations  [facility] is a research-based population genetic screening initiative. It is not a comprehensive genetic test. It screens for the CDC tier 1 genetic conditions (Delgado syndrome, hereditary breast and ovarian cancer syndrome, and FH) only. Patients may be eligible for additional genetic testing for other genetic conditions based on their personal or family history.     We do not have additional genetic testing recommendations for Argenis Surinder.    Positive Result:  Argenis was encouraged to discuss these genetic test results with her PCP.

## 2025-07-07 ENCOUNTER — OFFICE VISIT (OUTPATIENT)
Age: 60
End: 2025-07-07
Payer: COMMERCIAL

## 2025-07-07 VITALS
HEIGHT: 67 IN | TEMPERATURE: 97.9 F | SYSTOLIC BLOOD PRESSURE: 122 MMHG | BODY MASS INDEX: 23.86 KG/M2 | DIASTOLIC BLOOD PRESSURE: 84 MMHG | RESPIRATION RATE: 98 BRPM | HEART RATE: 77 BPM | WEIGHT: 152 LBS

## 2025-07-07 DIAGNOSIS — L82.1 SEBORRHEIC KERATOSIS: ICD-10-CM

## 2025-07-07 DIAGNOSIS — D22.9 MULTIPLE MELANOCYTIC NEVI: ICD-10-CM

## 2025-07-07 DIAGNOSIS — L81.4 LENTIGINES: ICD-10-CM

## 2025-07-07 DIAGNOSIS — Z13.89 SCREENING FOR SKIN CONDITION: Primary | ICD-10-CM

## 2025-07-07 DIAGNOSIS — D18.01 CHERRY ANGIOMA: ICD-10-CM

## 2025-07-07 PROCEDURE — 99213 OFFICE O/P EST LOW 20 MIN: CPT

## 2025-07-07 NOTE — PATIENT INSTRUCTIONS
"MELANOCYTIC NEVI (\"Moles\")    Paste patient specific assessment and plan here     Melanocytic nevi (\"moles\") are tan or brown, raised or flat areas of the skin which have an increased number of melanocytes. Melanocytes are the cells in our body which make pigment and account for skin color.    Some moles are present at birth (I.e., \"congenital nevi\"), while others come up later in life (i.e., \"acquired nevi\").  The sun can stimulate the body to make more moles.  Sunburns are not the only thing that triggers more moles.  Chronic sun exposure can do it too.     Clinically distinguishing a healthy mole from melanoma may be difficult, even for experienced dermatologists. The \"ABCDE's\" of moles have been suggested as a means of helping to alert a person to a suspicious mole and the possible increased risk of melanoma.  The suggestions for raising alert are as follows:    Asymmetry: Healthy moles tend to be symmetric, while melanomas are often asymmetric.  Asymmetry means if you draw a line through the mole, the two halves do not match in color, size, shape, or surface texture. Asymmetry can be a result of rapid enlargement of a mole, the development of a raised area on a previously flat lesion, scaling, ulceration, bleeding or scabbing within the mole.  Any mole that starts to demonstrate \"asymmetry\" should be examined promptly by a board certified dermatologist.     Border: Healthy moles tend to have discrete, even borders.  The border of a melanoma often blends into the normal skin and does not sharply delineate the mole from normal skin.  Any mole that starts to demonstrate \"uneven borders\" should be examined promptly by a board certified dermatologist.     Color: Healthy moles tend to be one color throughout.  Melanomas tend to be made up of different colors ranging from dark black, blue, white, or red.  Any mole that demonstrates a color change should be examined promptly by a board certified dermatologist. " "    Diameter: Healthy moles tend to be smaller than 0.6 cm in size; an exception are \"congenital nevi\" that can be larger.  Melanomas tend to grow and can often be greater than 0.6 cm (1/4 of an inch, or the size of a pencil eraser). This is only a guideline, and many normal moles may be larger than 0.6 cm without being unhealthy.  Any mole that starts to change in size (small to bigger or bigger to smaller) should be examined promptly by a board certified dermatologist.     Evolving: Healthy moles tend to \"stay the same.\"  Melanomas may often show signs of change or evolution such as a change in size, shape, color, or elevation.  Any mole that starts to itch, bleed, crust, burn, hurt, or ulcerate or demonstrate a change or evolution should be examined promptly by a board certified dermatologist.      Dysplastic Nevi  Dysplastic moles are moles that fit the ABCDE rules of melanoma but are not identified as melanomas when examined under the microscope.  They may indicate an increased risk of melanoma in that person. If there is a family history of melanoma, most experts agree that the person may be at an increased risk for developing a melanoma.  Experts still do not agree on what dysplastic moles mean in patients without a personal or family history of melanoma.  Dysplastic moles are usually larger than common moles and have different colors within it with irregular borders. The appearance can be very similar to a melanoma. Biopsies of dysplastic moles may show abnormalities which are different from a regular mole.      Melanoma  Malignant melanoma is a type of skin cancer that can be deadly if it spreads throughout the body. The incidence of melanoma in the [place] is growing faster than any other cancer. Melanoma usually grows near the surface of the skin for a period of time, and then begins to grow deeper into the skin. Once it grows deeper into the skin, the risk of spread to other organs greatly " "increases. Therefore, early detection and removal of a malignant melanoma may result in a better chance at a complete cure; removal after the tumor has spread may not be as effective, leading to worse clinical outcomes such as death.    The true rate of nevus transformation into a melanoma is unknown. It has been estimated that the lifetime risk for any acquired melanocytic nevus on any 20-year-old individual transforming into melanoma by age 80 is 0.03% (1 in 3,164) for men and 0.009% (1 in 10,800) for women.     The appearance of a \"new mole\" remains one of the most reliable methods for identifying a malignant melanoma.  Occasionally, melanomas appear as rapidly growing, blue-black, dome-shaped bumps within a previous mole or previous area of normal skin.  Other times, melanomas are suspected when a mole suddenly appears or changes. Itching, burning, or pain in a pigmented lesion should increase suspicion, but most patients with early melanoma have no skin discomfort whatsoever.  Melanoma can occur anywhere on the skin, including areas that are difficult for self-examination. Many melanomas are first noticed by other family members.  Suspicious-looking moles may be removed for microscopic examination.       You may be able to prevent death from melanoma by doing two simple things:    Try to avoid unnecessary sun exposure and protect your skin when it is exposed to the sun.  People who live near the equator, people who have intermittent exposures to large amounts of sun, and people who have had sunburns in childhood or adolescence have an increased risk for melanoma. Sun sense and vigilant sun protection may be keys to helping to prevent melanoma.  We recommend wearing UPF-rated sun protective clothing and sunglasses whenever possible and applying a moisturizer-sunscreen combination product (SPF 50+) such as Neutrogena Daily Defense to sun exposed areas of skin at least three times a day.    Have your moles " "regularly examined by a board certified dermatologist AND by yourself or a family member/friend at home.  We recommend that you have your moles examined at least once a year by a board certified dermatologist.  Use your birthday as an annual reminder to have your \"Birthday Suit\" (I.e., your skin) examined; it is a nice birthday gift to yourself to know that your skin is healthy appearing!  Additionally, at-home self examinations may be helpful for detecting a possible melanoma.  Use the ABCDEs we discussed and check your moles once a month at home.        SEBORRHEIC KERATOSIS  A seborrheic keratosis is a harmless warty spot that appears during adult life as a common sign of skin aging.  Seborrheic keratoses can arise on any area of skin, covered or uncovered, with the usual exception of the palms and soles. They do not arise from mucous membranes. Seborrheic keratoses can have highly variable appearance.      Seborrheic keratoses are extremely common. It has been estimated that over 90% of adults over the age of 60 years have one or more of them. They occur in males and females of all races, typically beginning to erupt in the 30s or 40s. They are uncommon under the age of 20 years.  The precise cause of seborrhoeic keratoses is not known.  Seborrhoeic keratoses are considered degenerative in nature. As time goes by, seborrheic keratoses tend to become more numerous. Some people inherit a tendency to develop a very large number of them; some people may have hundreds of them.    The name \"seborrheic keratosis\" is misleading, because these lesions are not limited to a seborrhoeic distribution (scalp, mid-face, chest, upper back), nor are they formed from sebaceous glands, nor are they associated with sebum -- which is greasy.  Seborrheic keratosis may also be called \"SK,\" \"Seb K,\" \"basal cell papilloma,\" \"senile wart,\" or \"barnacle.\"      There is no easy way to remove multiple lesions on a single occasion.  Unless a " "specific lesion is \"inflamed\" and is causing pain or stinging/burning or is bleeding, most insurance companies do not authorize treatment.      ANGIOMA (\"CHERRY ANGIOMA\")  [place] angiomas markedly increase in number from about the age of 40, so it has been estimated that 75% of people over 75 years of age have them. Although they also called \"senile angiomas,\" they can occur in young people too - 5% of adolescents have been found to have them.     Cherry angiomas are very common in males and females of any age or race, with no difference in sexes or races affected. They are however more noticeable in white skin than in skin of colour.  There may be a family history of similar lesions. Eruptive (very large number appearing in a short period of time) cherry angiomas have been rarely reported to be associated with internal malignancy and pregnancy.   "

## 2025-07-07 NOTE — PROGRESS NOTES
"[facility] Note     Patient Name: Argenis Cadena  Encounter Date: 7/7/25       Have you been cared for by a [facility] in the last 3 years and, if so, which description applies to you? Yes. I have been here within the last 3 years, and my medical history has NOT changed since that time. I am not of child-bearing potential.     REVIEW OF SYSTEMS:  Have you recently had or currently have any of the following? No changes in my recent health.   PAST MEDICAL HISTORY:  Have you personally ever had or currently have any of the following?  If \"YES,\" then please provide more detail. No changes in my medical history.   HISTORY OF IMMUNOSUPPRESSION: Do you have a history of any of the following:  Systemic Immunosuppression such as Diabetes, Biologic or Immunotherapy, Chemotherapy, Organ Transplantation, Bone Marrow Transplantation or Prednisone?  No     Answering \"YES\" requires the addition of the dotphrase \"IMMUNOSUPPRESSED\" as the first diagnosis of the patient's visit.   FAMILY HISTORY:  Any \"first degree relatives\" (parent, brother, sister, or child) with the following?    No changes in my family's known health.   PATIENT EXPERIENCE:    Do you want the Dermatologist to perform a COMPLETE skin exam today including a clinical examination under the \"bra and underwear\" areas?  Yes  If necessary, do we have your permission to call and leave a detailed message on your Preferred Phone number that includes your specific medical information?  Yes      Allergies[1] Current Medications[2]              Whom besides the patient is providing clinical information about today's encounter?   NO ADDITIONAL HISTORIAN (patient alone provided history)    Physical Exam and Assessment/Plan by Diagnosis:      MELANOCYTIC NEVI (\"Moles\")    Physical Exam:  Anatomic Location Affected:   Mostly on sun-exposed areas of the trunk and extremities   Morphological Description:  Scattered, 1-4mm round to ovoid, " "symmetrical-appearing, even bordered, skin colored to dark brown macules/papules      Additional History of Present Condition:  Present on exam. Denies any pain, itch, bleeding. Present for years. Denies actively changing or growing moles.     Assessment and Plan:  Based on a thorough discussion of this condition and the management approach to it (including a comprehensive discussion of the known risks, side effects and potential benefits of treatment), the patient (family) agrees to implement the following specific plan:  Reassured benign.   Monitor for changes. ABCDE's of melanoma handout provided.   Practice sun protection. Apply broad spectrum (UVA and UVB) sunscreen, SPF 30 or higher every 2 hours. Wear sun protective clothing, hats, and sunglasses.   LENTIGO    Physical Exam:  Anatomic Location Affected:  sun exposed areas of trunk and extremities   Morphological Description:  multiple scattered tan to brown evenly pigmented macules      Additional History of Present Condition:  Present on exam.     Assessment and Plan:  Based on a thorough discussion of this condition and the management approach to it (including a comprehensive discussion of the known risks, side effects and potential benefits of treatment), the patient (family) agrees to implement the following specific plan:  Reassured benign.   Practice sun protection. Apply broad spectrum (UVA and UVB) sunscreen, SPF 30 or higher every 2 hours. Wear sun protective clothing, hats, and sunglasses.       SEBORRHEIC KERATOSIS; NON-INFLAMED    Physical Exam:  Anatomic Location Affected:  trunk and extremities   Morphological Description:  Flat and raised, waxy, smooth to warty textured, yellow to brownish-grey to dark brown to blackish, discrete, \"stuck-on\" appearing papules.      Additional History of Present Condition:  Present on exam. Patient denies any itching.     Assessment and Plan:  Based on a thorough discussion of this condition and the management " "approach to it (including a comprehensive discussion of the known risks, side effects and potential benefits of treatment), the patient (family) agrees to implement the following specific plan:  Reassured benign.     ANGIOMA (\"CHERRY ANGIOMA\")     Physical Exam:  Anatomic Location: scattered across trunk and extremities   Morphologic Description: 1-2 mm firm red to maroon to purples to blue discrete papule, on dermoscopy lacunae  by white septae seen       Additional History of Present Condition:  Present on exam.     Plan:  Reassured benign.     Follow-up: 1 year for skin exam.   Scribe Attestation      I,:  Yolette Jimmie am acting as a scribe while in the presence of the attending physician.:       I,:  Carmen Dorado PA-C personally performed the services described in this documentation    as scribed in my presence.:                    [1]   Allergies  Allergen Reactions    Penicillins Rash   [2]   Current Outpatient Medications:     estrogens, conjugated (Premarin) vaginal cream, Insert 0.5 g into the vagina 2 (two) times a week, Disp: 30 g, Rfl: 0    lisinopril (ZESTRIL) 10 mg tablet, Take 1 tablet (10 mg total) by mouth daily, Disp: 100 tablet, Rfl: 1    pantoprazole (PROTONIX) 40 mg tablet, Take 1 tablet (40 mg total) by mouth daily (Patient not taking: Reported on 1/29/2025), Disp: 31 tablet, Rfl: 6    rosuvastatin (CRESTOR) 40 MG tablet, Take 1 tablet (40 mg total) by mouth daily, Disp: 90 tablet, Rfl: 3    "

## 2025-07-24 ENCOUNTER — APPOINTMENT (OUTPATIENT)
Age: 60
End: 2025-07-24
Payer: COMMERCIAL

## 2025-07-24 ENCOUNTER — HOSPITAL ENCOUNTER (OUTPATIENT)
Age: 60
Discharge: HOME | End: 2025-07-24
Payer: COMMERCIAL

## 2025-07-24 DIAGNOSIS — Z82.49 FAMILY HISTORY OF HEART DISEASE: ICD-10-CM

## 2025-07-24 DIAGNOSIS — Z12.31 ENCOUNTER FOR SCREENING MAMMOGRAM FOR BREAST CANCER: ICD-10-CM

## 2025-07-24 DIAGNOSIS — E78.01 FAMILIAL HYPERCHOLESTEREMIA: ICD-10-CM

## 2025-07-24 LAB
LDLC SERPL DIRECT ASSAY-MCNC: 138 MG/DL (ref 0–100)
TSH SERPL DL<=0.05 MIU/L-ACNC: 2.55 UIU/ML (ref 0.45–4.5)

## 2025-07-24 PROCEDURE — 83721 ASSAY OF BLOOD LIPOPROTEIN: CPT

## 2025-07-24 PROCEDURE — 77063 BREAST TOMOSYNTHESIS BI: CPT

## 2025-07-24 PROCEDURE — 84443 ASSAY THYROID STIM HORMONE: CPT

## 2025-07-24 PROCEDURE — 36415 COLL VENOUS BLD VENIPUNCTURE: CPT

## 2025-07-24 PROCEDURE — 77067 SCR MAMMO BI INCL CAD: CPT

## 2025-07-28 ENCOUNTER — OFFICE VISIT (OUTPATIENT)
Dept: CARDIOLOGY CLINIC | Facility: MEDICAL CENTER | Age: 60
End: 2025-07-28
Payer: COMMERCIAL

## 2025-07-28 ENCOUNTER — RESULTS FOLLOW-UP (OUTPATIENT)
Age: 60
End: 2025-07-28

## 2025-07-28 VITALS
DIASTOLIC BLOOD PRESSURE: 80 MMHG | SYSTOLIC BLOOD PRESSURE: 124 MMHG | OXYGEN SATURATION: 99 % | HEART RATE: 79 BPM | BODY MASS INDEX: 24.01 KG/M2 | HEIGHT: 67 IN | WEIGHT: 153 LBS

## 2025-07-28 DIAGNOSIS — R92.343 EXTREMELY DENSE TISSUE OF BOTH BREASTS ON MAMMOGRAPHY: Primary | ICD-10-CM

## 2025-07-28 DIAGNOSIS — E78.01 FAMILIAL HYPERCHOLESTEREMIA: ICD-10-CM

## 2025-07-28 DIAGNOSIS — Z82.49 FAMILY HISTORY OF HEART DISEASE: ICD-10-CM

## 2025-07-28 DIAGNOSIS — I10 PRIMARY HYPERTENSION: Primary | ICD-10-CM

## 2025-07-28 DIAGNOSIS — E78.00 PURE HYPERCHOLESTEROLEMIA: ICD-10-CM

## 2025-07-28 DIAGNOSIS — Z12.39 ENCOUNTER FOR BREAST CANCER SCREENING USING NON-MAMMOGRAM MODALITY: ICD-10-CM

## 2025-07-28 PROCEDURE — 99214 OFFICE O/P EST MOD 30 MIN: CPT | Performed by: INTERNAL MEDICINE

## 2025-07-28 RX ORDER — EZETIMIBE 10 MG/1
10 TABLET ORAL DAILY
Qty: 90 TABLET | Refills: 3 | Status: SHIPPED | OUTPATIENT
Start: 2025-07-28